# Patient Record
Sex: FEMALE | Race: WHITE | NOT HISPANIC OR LATINO | Employment: UNEMPLOYED | URBAN - METROPOLITAN AREA
[De-identification: names, ages, dates, MRNs, and addresses within clinical notes are randomized per-mention and may not be internally consistent; named-entity substitution may affect disease eponyms.]

---

## 2017-04-11 ENCOUNTER — ALLSCRIPTS OFFICE VISIT (OUTPATIENT)
Dept: OTHER | Facility: OTHER | Age: 52
End: 2017-04-11

## 2017-04-11 ENCOUNTER — TRANSCRIBE ORDERS (OUTPATIENT)
Dept: ADMINISTRATIVE | Facility: HOSPITAL | Age: 52
End: 2017-04-11

## 2017-04-11 ENCOUNTER — HOSPITAL ENCOUNTER (OUTPATIENT)
Dept: RADIOLOGY | Facility: CLINIC | Age: 52
Discharge: HOME | End: 2017-04-11
Payer: COMMERCIAL

## 2017-04-11 DIAGNOSIS — M79.642 PAIN OF LEFT HAND: ICD-10-CM

## 2017-04-11 DIAGNOSIS — M79.641 PAIN OF RIGHT HAND: ICD-10-CM

## 2017-04-11 DIAGNOSIS — R20.0 NUMBNESS: Primary | ICD-10-CM

## 2017-04-11 PROCEDURE — 73130 X-RAY EXAM OF HAND: CPT

## 2017-04-17 ENCOUNTER — OFFICE VISIT (OUTPATIENT)
Dept: RADIOLOGY | Facility: CLINIC | Age: 52
End: 2017-04-17
Payer: COMMERCIAL

## 2017-04-17 DIAGNOSIS — R20.0 NUMBNESS: ICD-10-CM

## 2017-04-17 PROCEDURE — 95909 NRV CNDJ TST 5-6 STUDIES: CPT

## 2017-04-17 PROCEDURE — 95886 MUSC TEST DONE W/N TEST COMP: CPT

## 2017-05-03 ENCOUNTER — ALLSCRIPTS OFFICE VISIT (OUTPATIENT)
Dept: OTHER | Facility: OTHER | Age: 52
End: 2017-05-03

## 2017-05-15 ENCOUNTER — ALLSCRIPTS OFFICE VISIT (OUTPATIENT)
Dept: OTHER | Facility: OTHER | Age: 52
End: 2017-05-15

## 2017-05-15 DIAGNOSIS — M46.1 SACROILIITIS, NOT ELSEWHERE CLASSIFIED (HCC): ICD-10-CM

## 2017-05-15 DIAGNOSIS — M54.2 CERVICALGIA: ICD-10-CM

## 2017-05-15 DIAGNOSIS — M54.41 LOW BACK PAIN WITH RIGHT-SIDED SCIATICA: ICD-10-CM

## 2017-05-19 ENCOUNTER — HOSPITAL ENCOUNTER (OUTPATIENT)
Facility: HOSPITAL | Age: 52
Setting detail: OUTPATIENT SURGERY
Discharge: HOME | End: 2017-05-19
Attending: ORTHOPAEDIC SURGERY | Admitting: ORTHOPAEDIC SURGERY
Payer: COMMERCIAL

## 2017-05-19 VITALS
SYSTOLIC BLOOD PRESSURE: 141 MMHG | HEIGHT: 67 IN | OXYGEN SATURATION: 96 % | DIASTOLIC BLOOD PRESSURE: 67 MMHG | HEART RATE: 76 BPM | TEMPERATURE: 97.9 F | RESPIRATION RATE: 18 BRPM | WEIGHT: 137 LBS | BODY MASS INDEX: 21.5 KG/M2

## 2017-05-19 RX ORDER — GABAPENTIN 100 MG/1
100 CAPSULE ORAL 3 TIMES DAILY
COMMUNITY
End: 2019-01-10 | Stop reason: ALTCHOICE

## 2017-05-19 RX ORDER — IBUPROFEN 200 MG
400 TABLET ORAL EVERY 6 HOURS PRN
COMMUNITY
End: 2017-05-19 | Stop reason: HOSPADM

## 2017-05-19 RX ORDER — HYDROCODONE BITARTRATE AND ACETAMINOPHEN 5; 325 MG/1; MG/1
1 TABLET ORAL EVERY 6 HOURS PRN
Qty: 10 TABLET | Refills: 0 | Status: SHIPPED | OUTPATIENT
Start: 2017-05-19 | End: 2017-05-29

## 2017-05-19 RX ORDER — IBUPROFEN 600 MG/1
600 TABLET ORAL EVERY 6 HOURS PRN
Qty: 10 TABLET | Refills: 0 | Status: SHIPPED | OUTPATIENT
Start: 2017-05-19 | End: 2022-03-25

## 2017-05-31 ENCOUNTER — ALLSCRIPTS OFFICE VISIT (OUTPATIENT)
Dept: OTHER | Facility: OTHER | Age: 52
End: 2017-05-31

## 2017-08-04 ENCOUNTER — OFFICE VISIT (OUTPATIENT)
Dept: URGENT CARE | Age: 52
End: 2017-08-04
Payer: COMMERCIAL

## 2017-08-04 PROCEDURE — S9083 URGENT CARE CENTER GLOBAL: HCPCS

## 2017-08-04 PROCEDURE — G0382 LEV 3 HOSP TYPE B ED VISIT: HCPCS

## 2017-11-23 ENCOUNTER — APPOINTMENT (EMERGENCY)
Dept: RADIOLOGY | Facility: HOSPITAL | Age: 52
End: 2017-11-23
Payer: COMMERCIAL

## 2017-11-23 ENCOUNTER — HOSPITAL ENCOUNTER (EMERGENCY)
Facility: HOSPITAL | Age: 52
Discharge: HOME | End: 2017-11-23
Attending: EMERGENCY MEDICINE | Admitting: EMERGENCY MEDICINE
Payer: COMMERCIAL

## 2017-11-23 VITALS
BODY MASS INDEX: 21.93 KG/M2 | RESPIRATION RATE: 18 BRPM | DIASTOLIC BLOOD PRESSURE: 63 MMHG | OXYGEN SATURATION: 93 % | TEMPERATURE: 99.9 F | WEIGHT: 139.99 LBS | SYSTOLIC BLOOD PRESSURE: 129 MMHG | HEART RATE: 88 BPM

## 2017-11-23 DIAGNOSIS — J20.9 ACUTE BRONCHITIS: Primary | ICD-10-CM

## 2017-11-23 LAB
ALBUMIN SERPL BCP-MCNC: 3.4 G/DL (ref 3.5–5)
ALP SERPL-CCNC: 62 U/L (ref 46–116)
ALT SERPL W P-5'-P-CCNC: 22 U/L (ref 12–78)
ANION GAP SERPL CALCULATED.3IONS-SCNC: 11 MMOL/L (ref 4–13)
AST SERPL W P-5'-P-CCNC: 18 U/L (ref 5–45)
BASOPHILS # BLD AUTO: 0.02 THOUSANDS/ΜL (ref 0–0.1)
BASOPHILS NFR BLD AUTO: 0 % (ref 0–1)
BILIRUB SERPL-MCNC: 0.2 MG/DL (ref 0.2–1)
BUN SERPL-MCNC: 15 MG/DL (ref 5–25)
CALCIUM SERPL-MCNC: 8.5 MG/DL (ref 8.3–10.1)
CHLORIDE SERPL-SCNC: 107 MMOL/L (ref 100–108)
CLARITY, POC: NORMAL
CO2 SERPL-SCNC: 25 MMOL/L (ref 21–32)
COLOR, POC: NORMAL
CREAT SERPL-MCNC: 0.94 MG/DL (ref 0.6–1.3)
EOSINOPHIL # BLD AUTO: 0.19 THOUSAND/ΜL (ref 0–0.61)
EOSINOPHIL NFR BLD AUTO: 1 % (ref 0–6)
ERYTHROCYTE [DISTWIDTH] IN BLOOD BY AUTOMATED COUNT: 12.9 % (ref 11.6–15.1)
EXT BILIRUBIN, UA: NEGATIVE
EXT BLOOD URINE: NORMAL
EXT GLUCOSE, UA: NEGATIVE
EXT KETONES: NEGATIVE
EXT NITRITE, UA: NEGATIVE
EXT PH, UA: 6
EXT PROTEIN, UA: NEGATIVE
EXT SPECIFIC GRAVITY, UA: 1.02
EXT UROBILINOGEN: 0.2
FLUAV AG SPEC QL IA: NEGATIVE
FLUBV AG SPEC QL IA: NEGATIVE
GFR SERPL CREATININE-BSD FRML MDRD: 69 ML/MIN/1.73SQ M
GLUCOSE SERPL-MCNC: 108 MG/DL (ref 65–140)
HCT VFR BLD AUTO: 36.5 % (ref 34.8–46.1)
HGB BLD-MCNC: 12.4 G/DL (ref 11.5–15.4)
LYMPHOCYTES # BLD AUTO: 1.52 THOUSANDS/ΜL (ref 0.6–4.47)
LYMPHOCYTES NFR BLD AUTO: 9 % (ref 14–44)
MCH RBC QN AUTO: 30.4 PG (ref 26.8–34.3)
MCHC RBC AUTO-ENTMCNC: 34 G/DL (ref 31.4–37.4)
MCV RBC AUTO: 90 FL (ref 82–98)
MONOCYTES # BLD AUTO: 1.03 THOUSAND/ΜL (ref 0.17–1.22)
MONOCYTES NFR BLD AUTO: 6 % (ref 4–12)
NEUTROPHILS # BLD AUTO: 13.84 THOUSANDS/ΜL (ref 1.85–7.62)
NEUTS SEG NFR BLD AUTO: 84 % (ref 43–75)
PLATELET # BLD AUTO: 259 THOUSANDS/UL (ref 149–390)
PMV BLD AUTO: 10.6 FL (ref 8.9–12.7)
POTASSIUM SERPL-SCNC: 3.4 MMOL/L (ref 3.5–5.3)
PROT SERPL-MCNC: 6.1 G/DL (ref 6.4–8.2)
RBC # BLD AUTO: 4.08 MILLION/UL (ref 3.81–5.12)
SODIUM SERPL-SCNC: 143 MMOL/L (ref 136–145)
WBC # BLD AUTO: 16.6 THOUSAND/UL (ref 4.31–10.16)
WBC # BLD EST: NEGATIVE 10*3/UL

## 2017-11-23 PROCEDURE — 94640 AIRWAY INHALATION TREATMENT: CPT

## 2017-11-23 PROCEDURE — 93005 ELECTROCARDIOGRAM TRACING: CPT | Performed by: EMERGENCY MEDICINE

## 2017-11-23 PROCEDURE — 36415 COLL VENOUS BLD VENIPUNCTURE: CPT | Performed by: EMERGENCY MEDICINE

## 2017-11-23 PROCEDURE — 80053 COMPREHEN METABOLIC PANEL: CPT | Performed by: EMERGENCY MEDICINE

## 2017-11-23 PROCEDURE — 99285 EMERGENCY DEPT VISIT HI MDM: CPT

## 2017-11-23 PROCEDURE — 87400 INFLUENZA A/B EACH AG IA: CPT | Performed by: EMERGENCY MEDICINE

## 2017-11-23 PROCEDURE — 81002 URINALYSIS NONAUTO W/O SCOPE: CPT | Performed by: EMERGENCY MEDICINE

## 2017-11-23 PROCEDURE — 87798 DETECT AGENT NOS DNA AMP: CPT | Performed by: EMERGENCY MEDICINE

## 2017-11-23 PROCEDURE — 96361 HYDRATE IV INFUSION ADD-ON: CPT

## 2017-11-23 PROCEDURE — 96374 THER/PROPH/DIAG INJ IV PUSH: CPT

## 2017-11-23 PROCEDURE — 85025 COMPLETE CBC W/AUTO DIFF WBC: CPT | Performed by: EMERGENCY MEDICINE

## 2017-11-23 PROCEDURE — 71010 HB CHEST X-RAY 1 VIEW FRONTAL (PORTABLE): CPT

## 2017-11-23 RX ORDER — IBUPROFEN 400 MG/1
400 TABLET ORAL ONCE
Status: COMPLETED | OUTPATIENT
Start: 2017-11-23 | End: 2017-11-23

## 2017-11-23 RX ORDER — AZITHROMYCIN 250 MG/1
TABLET, FILM COATED ORAL
Qty: 4 TABLET | Refills: 0 | Status: SHIPPED | OUTPATIENT
Start: 2017-11-23 | End: 2018-06-20 | Stop reason: ALTCHOICE

## 2017-11-23 RX ORDER — METHYLPREDNISOLONE SODIUM SUCCINATE 125 MG/2ML
125 INJECTION, POWDER, LYOPHILIZED, FOR SOLUTION INTRAMUSCULAR; INTRAVENOUS ONCE
Status: COMPLETED | OUTPATIENT
Start: 2017-11-23 | End: 2017-11-23

## 2017-11-23 RX ORDER — GUAIFENESIN/DEXTROMETHORPHAN 100-10MG/5
10 SYRUP ORAL ONCE
Status: COMPLETED | OUTPATIENT
Start: 2017-11-23 | End: 2017-11-23

## 2017-11-23 RX ORDER — ALBUTEROL SULFATE 90 UG/1
1 AEROSOL, METERED RESPIRATORY (INHALATION) ONCE
Status: COMPLETED | OUTPATIENT
Start: 2017-11-23 | End: 2017-11-23

## 2017-11-23 RX ORDER — AZITHROMYCIN 250 MG/1
500 TABLET, FILM COATED ORAL ONCE
Status: COMPLETED | OUTPATIENT
Start: 2017-11-23 | End: 2017-11-23

## 2017-11-23 RX ORDER — PROMETHAZINE HYDROCHLORIDE AND CODEINE PHOSPHATE 6.25; 1 MG/5ML; MG/5ML
5 SYRUP ORAL EVERY 4 HOURS PRN
Qty: 120 ML | Refills: 0 | Status: SHIPPED | OUTPATIENT
Start: 2017-11-23 | End: 2017-12-03

## 2017-11-23 RX ORDER — PREDNISONE 20 MG/1
20 TABLET ORAL DAILY
Qty: 12 TABLET | Refills: 0 | Status: SHIPPED | OUTPATIENT
Start: 2017-11-23 | End: 2017-11-27

## 2017-11-23 RX ADMIN — GUAIFENESIN AND DEXTROMETHORPHAN 10 ML: 100; 10 SYRUP ORAL at 20:34

## 2017-11-23 RX ADMIN — AZITHROMYCIN 500 MG: 250 TABLET, FILM COATED ORAL at 21:33

## 2017-11-23 RX ADMIN — IBUPROFEN 400 MG: 400 TABLET, FILM COATED ORAL at 21:34

## 2017-11-23 RX ADMIN — ALBUTEROL SULFATE 5 MG: 2.5 SOLUTION RESPIRATORY (INHALATION) at 20:37

## 2017-11-23 RX ADMIN — IPRATROPIUM BROMIDE 0.5 MG: 0.5 SOLUTION RESPIRATORY (INHALATION) at 20:37

## 2017-11-23 RX ADMIN — METHYLPREDNISOLONE SODIUM SUCCINATE 125 MG: 125 INJECTION, POWDER, FOR SOLUTION INTRAMUSCULAR; INTRAVENOUS at 21:37

## 2017-11-23 RX ADMIN — SODIUM CHLORIDE 1000 ML: 0.9 INJECTION, SOLUTION INTRAVENOUS at 20:41

## 2017-11-23 RX ADMIN — ALBUTEROL SULFATE 1 PUFF: 90 AEROSOL, METERED RESPIRATORY (INHALATION) at 21:35

## 2017-11-24 LAB
ATRIAL RATE: 85 BPM
FLUAV AG SPEC QL: NORMAL
FLUBV AG SPEC QL: NORMAL
P AXIS: 60 DEGREES
PR INTERVAL: 134 MS
QRS AXIS: 79 DEGREES
QRSD INTERVAL: 92 MS
QT INTERVAL: 340 MS
QTC INTERVAL: 404 MS
RSV B RNA SPEC QL NAA+PROBE: NORMAL
T WAVE AXIS: 62 DEGREES
VENTRICULAR RATE: 85 BPM

## 2017-11-24 NOTE — DISCHARGE INSTRUCTIONS
Acute Bronchitis   WHAT YOU SHOULD KNOW:   Acute bronchitis is swelling and irritation in the air passages of your lungs  This irritation may cause you to cough or have other breathing problems  Acute bronchitis often starts because of another viral illness, such as a cold or the flu  The illness spreads from your nose and throat to your windpipe and airways  Bronchitis is often called a chest cold  Acute bronchitis lasts about 2 weeks and is usually not a serious illness  AFTER YOU LEAVE:   Medicines:   · Ibuprofen or acetaminophen:  These medicines help lower a fever  They are available without a doctor's order  Ask your healthcare provider which medicine is right for you  Ask how much to take and how often to take it  Follow directions  These medicines can cause stomach bleeding if not taken correctly  Ibuprofen can cause kidney damage  Do not take ibuprofen if you have kidney disease, an ulcer, or allergies to aspirin  Acetaminophen can cause liver damage  Do not drink alcohol if you take acetaminophen  · Cough medicine: This medicine helps loosen mucus in your lungs and make it easier to cough up  This can help you breathe easier  · Inhalers: You may need one or more inhalers to help you breathe easier and cough less  An inhaler gives your medicine in a mist form so that you can breathe it into your lungs  Ask your healthcare provider to show you how to use your inhaler correctly  · Steroid medicine:  Steroid medicine helps open your air passages so you can breathe easier  · Take your medicine as directed  Call your healthcare provider if you think your medicine is not helping or if you have side effects  Tell him if you are allergic to any medicine  Keep a list of the medicines, vitamins, and herbs you take  Include the amounts, and when and why you take them  Bring the list or the pill bottles to follow-up visits  Carry your medicine list with you in case of an emergency    How to use an inhaler:   · Shake the inhaler well to make sure you get the correct amount of medicine per puff  Remove the cover from your inhaler's mouthpiece  If you are using a spacer, connect your inhaler to the flat end of the spacer  · Exhale as much air from your lungs as you can  Put the mouthpiece in your mouth past your front teeth and rest it on the top of your tongue  Do not block the mouthpiece opening with your tongue  · Breathe in through your mouth at a slow and steady rate  As you do this, press the inhaler to release the puff of medicine  Finish breathing in slowly and deeply as you inhale the medicine  When your lungs are full, hold your breath for 10 seconds  Then breathe out slowly through puckered lips or through your nose  · If you need to take more puffs, wait at least 1 minute between each puff  · Rinse your mouth with water after you use the inhaler  This may keep you from getting a mouth infection or irritation  · Follow the instructions that come with your inhaler to clean it  You should clean your inhaler at least once a week  Ways to care for yourself:   · Avoid alcohol:  Alcohol dulls your urge to cough and sneeze  When you have bronchitis, you need to be able to cough and sneeze to clear your air passages  Alcohol also causes your body to lose fluid  This can make the mucus in your lungs thicker and harder to cough up  · Avoid irritants in the air:  Do not smoke or allow others to smoke around you  Avoid chemicals, fumes, and dust  Wear a face mask if you must work around dust or fumes  Stay inside on days when air pollution levels are high  If you have allergies, stay inside when pollen counts are high  Avoid aerosol products  This includes spray-on deodorant, bug spray, and hair spray  · Drink more liquids:  Most people should drink at least 8 eight-ounce cups of water a day  You may need to drink more liquids when you have acute bronchitis   Liquids help keep your air passages moist and help you cough up mucus  · Get more rest:  You may feel like resting more  Slowly start to do more each day  Rest when you feel it is needed  · Eat healthy foods:  Eat a variety healthy foods every day  Your diet should include fruits, vegetables, breads, and protein (such as chicken, fish, and beans)  Dairy products (such as milk, cheese, and ice cream) can sometimes increase the amount of mucus your body makes  Ask if you should decrease your intake of dairy products  · Use a humidifier:  Use a cool mist humidifier to increase air moisture in your home  This may make it easier for you to breathe and help decrease your cough  Decrease your risk of acute bronchitis:   · Get the vaccinations you need:  Ask your healthcare provider if you should get vaccinated against the flu or pneumonia  · Avoid things that may irritate your lungs:  Stay inside or cover your mouth and nose with a scarf when you are outside during cold weather  You should also stay inside on days when air pollution levels are high  If you have allergies, stay inside when pollen counts are high  Avoid using aerosol products in your home  This includes spray-on deodorant, bug spray, and hair spray  · Avoid the spread of germs:        [facility] your hands often with soap and water  Carry germ-killing gel with you  You can use the gel to clean your hands when there is no soap and water available  ¨ Do not touch your eyes, nose, or mouth unless you have washed your hands first     ¨ Always cover your mouth when you cough  Cough into a tissue or your shirtsleeve so you do not spread germs from your hands  ¨ Try to avoid people who have a cold or the flu  If you are sick, stay away from others as much as possible  Follow up with your healthcare provider as directed:  Write down questions you have so you will remember to ask them during your follow-up visits    Contact your healthcare provider if:   · You have a fever     · Your skin becomes itchy or you have a rash after you take your medicine  · Your breathing problems do not go away or get worse  · Your cough does not get better with treatment  · You cough up blood  · You have questions or concerns about your condition or care  Seek care immediately or call 911 if:   · You faint  · Your lips or fingernails turn blue  · You feel like you are not getting enough air when you breathe  · You have swelling of your lips, tongue, or throat that makes it hard to breathe or swallow  © 2014 [address] [employer] is for End User's use only and may not be sold, redistributed or otherwise used for commercial purposes  All illustrations and images included in CareNotes® are the copyrighted property of [employer]  or Damaso Humphries  The above information is an  only  It is not intended as medical advice for individual conditions or treatments  Talk to your doctor, nurse or pharmacist before following any medical regimen to see if it is safe and effective for you

## 2017-11-24 NOTE — ED PROVIDER NOTES
History  Chief Complaint   Patient presents with    Breathing Difficulty     c/o cough and weakness x 2 days  Cough is non-productive  History provided by:  Patient   used: No    Cough   Cough characteristics:  Non-productive  Sputum characteristics:  Nondescript  Severity:  Moderate  Onset quality:  Gradual  Duration:  2 days  Timing:  Constant  Progression:  Worsening  Chronicity:  New  Smoker: yes    Context: upper respiratory infection    Ineffective treatments:  None tried  Associated symptoms: shortness of breath    Associated symptoms: no chest pain, no ear pain, no eye discharge, no fever, no headaches, no rash and no wheezing    Shortness of breath:     Severity:  Moderate    Onset quality:  Sudden    Duration:  1 day    Timing:  Constant    Progression:  Worsening      Prior to Admission Medications   Prescriptions Last Dose Informant Patient Reported? Taking?   gabapentin (NEURONTIN) 100 mg capsule   Yes No   Sig: Take 100 mg by mouth 3 (three) times a day   ibuprofen (MOTRIN) 600 mg tablet   No No   Sig: Take 1 tablet by mouth every 6 (six) hours as needed for mild pain or moderate pain for up to 30 days      Facility-Administered Medications: None       Past Medical History:   Diagnosis Date    Nerve pain        Past Surgical History:   Procedure Laterality Date    CHOLECYSTECTOMY      [place] WRIST Christia Coy LIG Left 5/19/2017    Procedure: ENDOSCOPIC CARPAL TUNNEL RELEASE ;  Surgeon: Cameron Tinoco MD;  Location[facility];  Service: Orthopedics    TONSILLECTOMY         No family history on file  I have reviewed and agree with the history as documented  Social History   Substance Use Topics    Smoking status: Current Every Day Smoker     Packs/day: 1 00    Smokeless tobacco: Never Used    Alcohol use 4 2 oz/week     7 Glasses of wine per week        Review of Systems   Constitutional: Negative for fatigue and fever     HENT: Negative for congestion and ear pain  Eyes: Negative for discharge and redness  Respiratory: Positive for cough and shortness of breath  Negative for apnea and wheezing  Cardiovascular: Negative for chest pain  Gastrointestinal: Negative for abdominal pain and diarrhea  Endocrine: Negative for cold intolerance and polydipsia  Genitourinary: Negative for difficulty urinating and hematuria  Musculoskeletal: Negative for arthralgias and back pain  Skin: Negative for color change and rash  Allergic/Immunologic: Negative for environmental allergies and immunocompromised state  Neurological: Negative for numbness and headaches  Hematological: Negative for adenopathy  Does not bruise/bleed easily  Psychiatric/Behavioral: Negative for agitation and behavioral problems  Physical Exam  ED Triage Vitals   Temperature Pulse Respirations Blood Pressure SpO2   11/23/17 1924 11/23/17 1923 11/23/17 1923 11/23/17 1923 11/23/17 1923   99 9 °F (37 7 °C) 102 18 146/76 93 %      Temp Source Heart Rate Source Patient Position - Orthostatic VS BP Location FiO2 (%)   11/23/17 1923 11/23/17 1923 11/23/17 1923 11/23/17 1923 --   Oral Monitor Sitting Right arm       Pain Score       11/23/17 1923       No Pain           Orthostatic Vital Signs  Vitals:    11/23/17 1923 11/23/17 2135   BP: 146/76 129/63   Pulse: 102 88   Patient Position - Orthostatic VS: Sitting Lying       Physical Exam   Constitutional: She is oriented to person, place, and time  Vital signs are normal  She appears well-developed and well-nourished  Non-toxic appearance  HENT:   Head: Normocephalic and atraumatic  Right Ear: Tympanic membrane and external ear normal    Left Ear: Tympanic membrane and external ear normal    Nose: Nose normal  No rhinorrhea, sinus tenderness or nasal deformity  Mouth/Throat: Uvula is midline and oropharynx is clear and moist  Normal dentition     Eyes: Conjunctivae, EOM and lids are normal  Pupils are equal, round, and reactive to light  Right eye exhibits no discharge  Left eye exhibits no discharge  Neck: Trachea normal and normal range of motion  Neck supple  No JVD present  Carotid bruit is not present  Cardiovascular: Normal rate, regular rhythm, intact distal pulses and normal pulses  No extrasystoles are present  PMI is not displaced  Pulmonary/Chest: Effort normal  No accessory muscle usage  No respiratory distress  She has decreased breath sounds  She has no wheezes  She has no rhonchi  She has no rales  Abdominal: Soft  Normal appearance and bowel sounds are normal  She exhibits no mass  There is no tenderness  There is no rigidity, no rebound and no guarding  Musculoskeletal:        Right shoulder: She exhibits normal range of motion, no bony tenderness, no swelling and no deformity  Cervical back: Normal  She exhibits normal range of motion, no tenderness, no bony tenderness and no deformity  Lymphadenopathy:     She has no cervical adenopathy  She has no axillary adenopathy  Neurological: She is alert and oriented to person, place, and time  She has normal strength and normal reflexes  No cranial nerve deficit or sensory deficit  GCS eye subscore is 4  GCS verbal subscore is 5  GCS motor subscore is 6  Skin: Skin is warm and dry  No rash noted  Psychiatric: She has a normal mood and affect  Her speech is normal and behavior is normal    Nursing note and vitals reviewed        ED Medications  Medications   albuterol inhalation solution 5 mg (5 mg Nebulization Given 11/23/17 2037)   ipratropium (ATROVENT) 0 02 % inhalation solution 0 5 mg (0 5 mg Nebulization Given 11/23/17 2037)   sodium chloride 0 9 % bolus 1,000 mL (0 mL Intravenous Stopped 11/23/17 2145)   dextromethorphan-guaiFENesin (ROBITUSSIN DM)  mg/5 mL oral syrup 10 mL (10 mL Oral Given 11/23/17 2034)   methylPREDNISolone sodium succinate (Solu-MEDROL) injection 125 mg (125 mg Intravenous Given 11/23/17 2137) ibuprofen (MOTRIN) tablet 400 mg (400 mg Oral Given 11/23/17 2134)   azithromycin (ZITHROMAX) tablet 500 mg (500 mg Oral Given 11/23/17 2133)   albuterol (PROVENTIL HFA,VENTOLIN HFA) inhaler 1 puff (1 puff Inhalation Given 11/23/17 2135)       Diagnostic Studies  Results Reviewed     Procedure Component Value Units Date/Time    Comprehensive metabolic panel [[id number]]  (Abnormal) Collected:  11/23/17 2027    Lab Status:  Final result Specimen:  Blood from Arm, Right Updated:  11/23/17 2104     Sodium 143 mmol/L      Potassium 3 4 (L) mmol/L      Chloride 107 mmol/L      CO2 25 mmol/L      Anion Gap 11 mmol/L      BUN 15 mg/dL      Creatinine 0 94 mg/dL      Glucose 108 mg/dL      Calcium 8 5 mg/dL      AST 18 U/L      ALT 22 U/L      Alkaline Phosphatase 62 U/L      Total Protein 6 1 (L) g/dL      Albumin 3 4 (L) g/dL      Total Bilirubin 0 20 mg/dL      eGFR 69 ml/min/1 73sq m     Narrative:         National Kidney Disease Education Program recommendations are as follows:  GFR calculation is accurate only with a steady state creatinine  Chronic Kidney disease less than 60 ml/min/1 73 sq  meters  Kidney failure less than 15 ml/min/1 73 sq  meters  Rapid Influenza Screen with Reflex PCR (indicated for patients <2mo of age) [[id number]]  (Normal) Collected:  11/23/17 2026    Lab Status:  Final result Specimen:  Nasopharyngeal from Nasopharyngeal Swab Updated:  11/23/17 2102     Rapid Influenza A Ag Negative     Rapid Influenza B Ag Negative    Influenza A/B and RSV by PCR (Indicated for patients > 2 mo of age) [[id number]] Collected:  11/23/17 2026    Lab Status:   In process Specimen:  Nasopharyngeal from Nasopharyngeal Swab Updated:  11/23/17 2101    CBC and differential [[id number]]  (Abnormal) Collected:  11/23/17 2027    Lab Status:  Final result Specimen:  Blood from Arm, Right Updated:  11/23/17 2047     WBC 16 60 (H) Thousand/uL      RBC 4 08 Million/uL      Hemoglobin 12 4 g/dL      Hematocrit 36 5 %      MCV 90 fL      MCH 30 4 pg      MCHC 34 0 g/dL      RDW 12 9 %      MPV 10 6 fL      Platelets 101 Thousands/uL      Neutrophils Relative 84 (H) %      Lymphocytes Relative 9 (L) %      Monocytes Relative 6 %      Eosinophils Relative 1 %      Basophils Relative 0 %      Neutrophils Absolute 13 84 (H) Thousands/µL      Lymphocytes Absolute 1 52 Thousands/µL      Monocytes Absolute 1 03 Thousand/µL      Eosinophils Absolute 0 19 Thousand/µL      Basophils Absolute 0 02 Thousands/µL     POCT urinalysis dipstick [[id number]]  (Normal) Resulted:  11/23/17 2024    Lab Status:  Final result Specimen:  Urine Updated:  11/23/17 2025     Color, UA color     Clarity, UA hazy     EXT Glucose, UA Negative     EXT Bilirubin, UA (Ref: Negative) Negative     EXT Ketones, UA (Ref: Negative) Negative     EXT Spec Grav, UA 1 020     EXT Blood, UA (Ref: Negative) Large     EXT pH, UA 6 0     EXT Protein, UA (Ref: Negative) Negative     EXT Urobilinogen, UA (Ref: 0 2- 1 0) 0 2     EXT Leukocytes, UA (Ref: Negative) Negative     EXT Nitrite, UA (Ref: Negative) Negative                 XR chest 1 view portable    (Results Pending)              Procedures  ECG 12 Lead Documentation  Date/Time: 11/23/2017 7:59 PM  Performed by: Dolly Arizmendi  Authorized by: Dolly Arizmendi     Patient location:  ED  Rate:     ECG rate:  85  Rhythm:     Rhythm: sinus rhythm               Phone Contacts  ED Phone Contact    ED Course  ED Course                                MDM  Number of Diagnoses or Management Options  Acute bronchitis: new and requires workup     Amount and/or Complexity of Data Reviewed  Clinical lab tests: ordered and reviewed  Tests in the radiology section of CPT®: ordered and reviewed  Tests in the medicine section of CPT®: ordered and reviewed  Independent visualization of images, tracings, or specimens: yes (cxr no pneumonia)    Patient Progress  Patient progress: improved    CritCare Time    Disposition  Final diagnoses:   Acute bronchitis     Time reflects when diagnosis was documented in both MDM as applicable and the Disposition within this note     Time User Action Codes Description Comment    11/23/2017  9:16 PM Michelle CATALAN Add [J20 9] Acute bronchitis       ED Disposition     ED Disposition Condition Comment    Discharge  [facility] discharge to home/self care  Condition at discharge: Good        Follow-up Information     Follow up With Specialties Details Why Contact Info    Rafael Lung, DO Family Medicine Schedule an appointment as soon as possible for a visit  [address] [place] [address] Dr [address] [place] [address]          Discharge Medication List as of 11/23/2017  9:18 PM      CONTINUE these medications which have NOT CHANGED    Details   gabapentin (NEURONTIN) 100 mg capsule Take 100 mg by mouth 3 (three) times a day, Until Discontinued, Historical Med      ibuprofen (MOTRIN) 600 mg tablet Take 1 tablet by mouth every 6 (six) hours as needed for mild pain or moderate pain for up to 30 days, Starting 5/19/2017, Until Sun 6/18/17, Print           No discharge procedures on file      ED Provider  Electronically Signed by           Kane Leigh DO  11/23/17 1200 Livia Aquino DO  11/23/17 [id number]

## 2018-01-12 NOTE — MISCELLANEOUS
Provider Comments  Provider Comments:   Our records indicate that you missed an appointment on 12/21/16  If you have not done so already, please call our office and we will be happy to schedule another appointment for you  If you must cancel your appointment, our office policy requires you to call our office at least 24 hours in advance so that we may utilize your appointment time for another patient who requires our services  If you have any questions, please contact our office at [phone]           Signatures   Electronically signed by : Jonathan Hoffman, ; Dec 21 2016  2:24PM EST                       (Author)

## 2018-01-13 VITALS
SYSTOLIC BLOOD PRESSURE: 159 MMHG | HEIGHT: 68 IN | DIASTOLIC BLOOD PRESSURE: 92 MMHG | HEART RATE: 67 BPM | WEIGHT: 135 LBS | BODY MASS INDEX: 20.46 KG/M2

## 2018-01-13 NOTE — PROGRESS NOTES
Assessment    1  Well adult on routine health check (V70 0) (Z00 00)   2  Breast cancer screening (V76 10) (Z12 39)   3  Colon cancer screening (V76 51) (Z12 11)   4  Cervical cancer screening (V76 2) (Z12 4)   5  Need for DTaP vaccine (V06 1) (Z23)   6  Lipid screening (V77 91) (Z13 220)   7  Elevated blood pressure reading without diagnosis of hypertension (796 2) (R03 0)   8  Varicose veins of both lower extremities with pain (454 8) (T31 275)    Plan  Breast cancer screening    · * MAMMO SCREENING BILATERAL W CAD; Status:Active; Requested for:66Coi8119;   Cervical cancer screening    · 1 Lance Carrillo MD, [address] [place] [address] Obstetrics/Gynecology Physician Referral  Consult  Status: Hold  For - Scheduling  Requested for: 54WOP8233  Care Summary provided  : Yes  Colon cancer screening    · COLONOSCOPY; Status:Active; Requested for:84Pch0693;    · 1 - Michael Henry MD (Gastroenterology) Physician Referral  Consult  Status: Active   Requested for: 00YOH9015  Care Summary provided  : Yes  Elevated blood pressure reading without diagnosis of hypertension, Lipid screening    · (1) COMPREHENSIVE METABOLIC PANEL; Status:Active; Requested for:95Yya6440;    · (1) LIPID PANEL FASTING W DIRECT LDL REFLEX; Status:Active; Requested  for:19Oks5185;    · (1) TSH WITH FT4 REFLEX; Status:Active; Requested for:99Ixc1312;   Need for DTaP vaccine    · Adacel 5-2-15 5 LF-MCG/0 5 Intramuscular Suspension  Varicose veins of both lower extremities with pain    · 1 - Sandor MOLINA, Sanket Yeboah  (Vascular Surgery) Physician Referral  Consult  Status: Active   Requested for: 96FTS5312  Care Summary provided  : Yes    Discussion/Summary  health maintenance visit Currently, she eats a healthy diet and has an adequate exercise regimen  the risks and benefits of cervical cancer screening were discussed Breast cancer screening: the risks and benefits of breast cancer screening were discussed   Colorectal cancer screening: the risks and benefits of colorectal cancer screening were discussed  The immunizations are needed  Advice and education were given regarding aerobic exercise and weight bearing exercise  Patient discussion: discussed with the patient  Chief Complaint  Patient here for Annual wellness      History of Present Illness  HM, Adult Female: The patient is being seen for a health maintenance evaluation  General Health: The patient's health since the last visit is described as good  She has regular dental visits  She denies vision problems  She denies hearing loss  Lifestyle:  She consumes a diverse and healthy diet  She does not have any weight concerns  She exercises regularly  She uses tobacco  The patient is a current cigarette smoker  Tobacco Use Duration: 1/2 cigarette pack(s) per day and 20 pack year(s) of cigarette use  She denies alcohol use  She denies drug use  Reproductive health: the patient is postmenopausal   she uses no contraception  pregnancy history:  Screening: cancer screening reviewed and updated  Cervical cancer screening includes no previous pap smear  Breast cancer screening includes uncertain timing of her last mammogram  She hasn't been previously screened for colorectal cancer  metabolic screening reviewed and updated  Metabolic screening includes lipid profile performed within the past five years, glucose screening performed last year and thyroid function test performed last year  Review of Systems    Constitutional: No fever, no chills, feels well, no tiredness, no recent weight gain or weight loss  Eyes: No complaints of eye pain, no red eyes, no eyesight problems, no discharge, no dry eyes, no itching of eyes  ENT: no complaints of earache, no loss of hearing, no nose bleeds, no nasal discharge, no sore throat, no hoarseness  Cardiovascular: No complaints of slow heart rate, no fast heart rate, no chest pain, no palpitations, no leg claudication, no lower extremity edema     Respiratory: No complaints of shortness of breath, no wheezing, no cough, no SOB on exertion, no orthopnea, no PND  Gastrointestinal: No complaints of abdominal pain, no constipation, no nausea or vomiting, no diarrhea, no bloody stools  Genitourinary: No complaints of dysuria, no incontinence, no pelvic pain, no dysmenorrhea, no vaginal discharge or bleeding  Musculoskeletal: No complaints of arthralgias, no myalgias, no joint swelling or stiffness, no limb pain or swelling  Integumentary: skin lesion, but as noted in HPI  Neurological: No complaints of headache, no confusion, no convulsions, no numbness, no dizziness or fainting, no tingling, no limb weakness, no difficulty walking  Psychiatric: Not suicidal, no sleep disturbance, no anxiety or depression, no change in personality, no emotional problems  Endocrine: No complaints of proptosis, no hot flashes, no muscle weakness, no deepening of the voice, no feelings of weakness  Hematologic/Lymphatic: No complaints of swollen glands, no swollen glands in the neck, does not bleed easily, does not bruise easily  Active Problems    1  Right shoulder pain (719 41) (M25 511)   2  Shoulder injury (959 2) (S49 90XA)    Past Medical History    · History of hypertension (V12 59) (Z86 79)    Surgical History    · History of Cholecystectomy   · History of Tonsillectomy With Adenoidectomy    Family History  Mother    · Family history of heart murmur (V17 49) (Z84 89)   · Family history of hypertension (V17 49) (Z82 49)  Father    · Family history of    · Family history of cardiac disorder (V17 49) (Z82 49)   · Family history of heart murmur (V17 49) (Z84 89)   · Family history of hypertension (V17 49) (Z82 49)    Social History    · Coffee   · Current every day smoker (305 1) (F17 200)   · No drug use   · Occasional alcohol use   · Smokes cigarettes (305 1) (F17 210)    Current Meds   1  Ibuprofen 600 MG Oral Tablet; TAKE 1 TABLET 3 TIMES DAILY AS NEEDED;    Therapy: 58VBN5427 to (Evaluate:89Wai1108) Recorded   2  Multi-Vitamin Daily Oral Tablet; TAKE 1 TABLET DAILY; Therapy: 19CTV0424 to Recorded    Allergies    1  No Known Drug Allergies    2  Seasonal    Vitals   Recorded: 01OVN0400 46:10EB   Systolic 205   Diastolic 80   Heart Rate 88   Respiration 16   Height 5 ft 7 84 in   Weight 137 lb 2 oz   BMI Calculated 20 95   BSA Calculated 1 74     Physical Exam    Constitutional   General appearance: No acute distress, well appearing and well nourished  Head and Face   Head and face: Normal     Eyes   Conjunctiva and lids: No swelling, erythema or discharge  Pupils and irises: Equal, round, reactive to light  Ears, Nose, Mouth, and Throat   External inspection of ears and nose: Normal     Otoscopic examination: Tympanic membranes translucent with normal light reflex  Canals patent without erythema  Hearing: Normal     Nasal mucosa, septum, and turbinates: Normal without edema or erythema  Lips, teeth, and gums: Normal, good dentition  Oropharynx: Normal with no erythema, edema, exudate or lesions  Neck   Neck: Supple, symmetric, trachea midline, no masses  Thyroid: Normal, no thyromegaly  Pulmonary   Respiratory effort: No increased work of breathing or signs of respiratory distress  Auscultation of lungs: Clear to auscultation  Cardiovascular   Auscultation of heart: Normal rate and rhythm, normal S1 and S2, no murmurs  Examination of extremities for edema and/or varicosities: Abnormal   varicosities noted bilaterally  Abdomen   Abdomen: Non-tender, no masses  Liver and spleen: No hepatomegaly or splenomegaly  Lymphatic   Palpation of lymph nodes in neck: No lymphadenopathy  Palpation of lymph nodes in axillae: No lymphadenopathy  Musculoskeletal   Gait and station: Normal     Digits and nails: Normal without clubbing or cyanosis      Joints, bones, and muscles: Normal     Range of motion: Normal     Stability: Normal     Muscle strength/tone: Normal     Skin   Skin and subcutaneous tissue: Normal without rashes or lesions  Palpation of skin and subcutaneous tissue: Normal turgor  Neurologic   Cranial nerves: Cranial nerves II-XII intact  Cortical function: Normal mental status  Reflexes: 2+ and symmetric  Sensation: No sensory loss  Coordination: Normal finger to nose and heel to shin  Psychiatric   Judgment and insight: Normal     Orientation to person, place, and time: Normal     Recent and remote memory: Intact  Mood and affect: Normal        Results/Data  PHQ-2 Adult Depression Screening [id number] 03:11PM User, [facility]     Test Name Result Flag Reference   PHQ-2 Adult Depression Score 0     Over the last two weeks, how often have you been bothered by any of the following problems?   Little interest or pleasure in doing things: Not at all - 0  Feeling down, depressed, or hopeless: Not at all - 0   PHQ-2 Adult Depression Screening Negative         Future Appointments    Date/Time Provider Specialty Site   12/21/2016 01:00 PM He Malone DO Family Medicine [facility]     Signatures   Electronically signed by : Maureen Hernandez DO; Sep 21 2016  4:15PM EST                       (Author)

## 2018-01-14 VITALS
DIASTOLIC BLOOD PRESSURE: 84 MMHG | HEART RATE: 76 BPM | WEIGHT: 138 LBS | BODY MASS INDEX: 21.66 KG/M2 | SYSTOLIC BLOOD PRESSURE: 147 MMHG | HEIGHT: 67 IN

## 2018-01-14 VITALS
BODY MASS INDEX: 20.76 KG/M2 | HEART RATE: 96 BPM | WEIGHT: 137 LBS | RESPIRATION RATE: 16 BRPM | DIASTOLIC BLOOD PRESSURE: 76 MMHG | HEIGHT: 68 IN | SYSTOLIC BLOOD PRESSURE: 148 MMHG

## 2018-01-14 VITALS
BODY MASS INDEX: 20.95 KG/M2 | WEIGHT: 137 LBS | SYSTOLIC BLOOD PRESSURE: 151 MMHG | HEART RATE: 91 BPM | DIASTOLIC BLOOD PRESSURE: 76 MMHG

## 2018-01-18 NOTE — MISCELLANEOUS
Message  Return to work or school:    She is able to return to work on  06/19/2016            Signatures   Electronically signed by : Ajay Wiseman DO; Jun 14 2016 12:07PM EST                       (Author)

## 2018-06-20 ENCOUNTER — OFFICE VISIT (OUTPATIENT)
Dept: URGENT CARE | Age: 53
End: 2018-06-20
Payer: COMMERCIAL

## 2018-06-20 VITALS
OXYGEN SATURATION: 96 % | RESPIRATION RATE: 16 BRPM | SYSTOLIC BLOOD PRESSURE: 150 MMHG | BODY MASS INDEX: 20.84 KG/M2 | HEART RATE: 67 BPM | HEIGHT: 67 IN | DIASTOLIC BLOOD PRESSURE: 80 MMHG | TEMPERATURE: 98.4 F | WEIGHT: 132.8 LBS

## 2018-06-20 DIAGNOSIS — L08.9 TOE INFECTION: Primary | ICD-10-CM

## 2018-06-20 PROCEDURE — S9083 URGENT CARE CENTER GLOBAL: HCPCS | Performed by: NURSE PRACTITIONER

## 2018-06-20 PROCEDURE — G0382 LEV 3 HOSP TYPE B ED VISIT: HCPCS | Performed by: NURSE PRACTITIONER

## 2018-06-20 RX ORDER — CEPHALEXIN 500 MG/1
500 CAPSULE ORAL EVERY 8 HOURS SCHEDULED
Qty: 21 CAPSULE | Refills: 0
Start: 2018-06-20 | End: 2018-06-27

## 2018-06-20 RX ORDER — CEFADROXIL 500 MG/1
500 CAPSULE ORAL EVERY 12 HOURS SCHEDULED
Qty: 14 CAPSULE | Refills: 0 | Status: SHIPPED | OUTPATIENT
Start: 2018-06-20 | End: 2018-06-20 | Stop reason: CLARIF

## 2018-06-20 NOTE — PROGRESS NOTES
[facility]        NAME: Kb Sawant is a 46 y o  female  : 1965    MRN: [id number]  DATE: 2018  TIME: 2:56 PM    Assessment and Plan   Toe infection [L08 9]  1  Toe infection  cefadroxil (DURICEF) 500 mg capsule         Patient Instructions     Patient Instructions   Continue warm soaks  Start antibiotic  Take probiotic  Keep site covered to prevent rubbing  Follow up with podiatry  Go to ER with worsening symptom  s       Chief Complaint     Chief Complaint   Patient presents with    Nail Problem     Pt has a possible Toe infection         History of Present Illness   Kb Sawant presents to the clinic c/o    This is a 46year old female here today with complaints of sore on her toe  She states this started last week  It started as a corn on her foot  He sneakers have been rubbing the site  She tried OTC corn removal which did not help at all  Over the last several days she has noticed increased redness and swelling  The site is tender  No fevers, body aches or chills  No history of diabetes  Review of Systems   Review of Systems   Constitutional: Negative  HENT: Negative  Skin: Positive for wound  Neurological: Negative  Psychiatric/Behavioral: Negative            Current Medications     Long-Term Prescriptions   Medication Sig Dispense Refill    gabapentin (NEURONTIN) 100 mg capsule Take 100 mg by mouth 3 (three) times a day      ibuprofen (MOTRIN) 600 mg tablet Take 1 tablet by mouth every 6 (six) hours as needed for mild pain or moderate pain for up to 30 days 10 tablet 0       Current Allergies     Allergies as of 2018    (No Known Allergies)            The following portions of the patient's history were reviewed and updated as appropriate: allergies, current medications, past family history, past medical history, past social history, past surgical history and problem list     Objective   /80   Pulse 67   Temp 98 4 °F (36 9 °C) Resp 16   Ht 5' 7" (1 702 m)   Wt 60 2 kg (132 lb 12 8 oz)   SpO2 96%   BMI 20 80 kg/m²        Physical Exam     Physical Exam   Constitutional: She is oriented to person, place, and time  She appears well-developed and well-nourished  Neck: Neck supple  Cardiovascular: Normal rate, regular rhythm and normal heart sounds  Pulmonary/Chest: Breath sounds normal    Neurological: She is alert and oriented to person, place, and time  Skin: Skin is warm and dry  Left fifth toe: hard dry white region with redness and swelling around the boarder  Psychiatric: She has a normal mood and affect   Her behavior is normal

## 2018-06-20 NOTE — PATIENT INSTRUCTIONS
Continue warm soaks  Start antibiotic  Take probiotic  Keep site covered to prevent rubbing  Follow up with podiatry  Go to ER with worsening symptom  s

## 2018-06-20 NOTE — LETTER
June 20, 2018   2  Patient: Rito Sandoval   YOB: 1965   Date of Visit: 6/20/2018       To Whom It May Concern: It is my medical opinion that Marin Curtis may return to work on 06/25/2018  If you have any questions or concerns, please don't hesitate to call           Sincerely,        OC Cifuentes    CC: No Recipients

## 2018-11-05 ENCOUNTER — EVALUATION (OUTPATIENT)
Dept: PHYSICAL THERAPY | Facility: REHABILITATION | Age: 53
End: 2018-11-05
Payer: OTHER MISCELLANEOUS

## 2018-11-05 DIAGNOSIS — M25.562 ACUTE PAIN OF LEFT KNEE: Primary | ICD-10-CM

## 2018-11-05 PROCEDURE — 97161 PT EVAL LOW COMPLEX 20 MIN: CPT | Performed by: PHYSICAL THERAPIST

## 2018-11-05 PROCEDURE — G8990 OTHER PT/OT CURRENT STATUS: HCPCS | Performed by: PHYSICAL THERAPIST

## 2018-11-05 PROCEDURE — G8991 OTHER PT/OT GOAL STATUS: HCPCS | Performed by: PHYSICAL THERAPIST

## 2018-11-05 PROCEDURE — 97110 THERAPEUTIC EXERCISES: CPT | Performed by: PHYSICAL THERAPIST

## 2018-11-05 NOTE — PROGRESS NOTES
PT Evaluation     Today's date: 2018  Patient name: Christina Estevez  : 1965  MRN: [id number]  Referring provider: Pita Gaston MD  Dx:   Encounter Diagnosis     ICD-10-CM    1  Acute pain of left knee M25 562                   Assessment  Impairments: abnormal or restricted ROM, impaired physical strength, lacks appropriate home exercise program and pain with function    Assessment details: Patient reports she has left pain that began on  pushing a rack loaded with weight and then realized the wheels were frozen to floor, she felt a pain on the inside of the left knee at that time, she reports the pain was a 9/10, she went home that day  She reported it the next day and then went to see the MD the following day, she is going to see the Workers comp doctor at the [employer]  She had an xray and MRI done at that time and they reported finding no damage in the knee, the doctor would like her to go through physical therapy before going to an orthopedic doctor  She reports all of her pain is on the inside of the knee with some pain behind the knee cap, she reports no numbness or tingling at the knee of down to the foot  She works as a home , 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, 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, squatting and other functional activities, she is currently on "light duty" at work, and she finds that she has difficulty with bending over and squatting, she feels that her knee is going to lock when she bends down  Demonstrates normal range of motion other than into ankle dorsiflexion due to calf tightness  Some LE pain limitations noted in all LE motions secondary to pain, and some limits in patellar ROM  Positive tests for meniscus pathology and complaints of joint line tenderness medially  Prognosis: good    Goals  ST- demonstrate compliancy with HEP in 1 week     Decrease reported pain to 3/10 at worst and with activity within 2 weeks   Decrease reports of L knee swelling within 3 weeks LT- Improve FOTO score to specified value to improve patients perceived benefit of therapy, in 8 weeks   Improve L LE strength to 4+/5 with all activities to improve ability to complete ADLs at home, and normal work duties, within 6 weeks  Return to full duty at work with no complaints of pain or fatigue in the L knee, within 8 weeks  Plan  Patient would benefit from: skilled physical therapy  Referral necessary: No  Planned modality interventions: cryotherapy, TENS and thermotherapy: hydrocollator packs  Planned therapy interventions: ADL training, balance, balance/weight bearing training, gait training, manual therapy, joint mobilization, neuromuscular re-education, strengthening, stretching, therapeutic activities and therapeutic exercise  Frequency: 2x week  Duration in weeks: 8  Plan of Care beginning date: 2018  Plan of Care expiration date: 2018  Treatment plan discussed with: patient  Plan details: Physical therapy with focus on there ex and manual therapy to improve ability to complete tasks around the house and complete functional activities, use of modalities as needed           Subjective Evaluation    History of Present Illness  Date of onset: 10/1/2018  Mechanism of injury: Pushing cart frozen to floor   Pain  Current pain ratin  At best pain ratin  At worst pain ratin  Location: L knee   Quality: burning and throbbing  Relieving factors: rest  Aggravating factors: walking, standing and sitting    Patient Goals  Patient goals for therapy: decreased pain, independence with ADLs/IADLs and return to work  Patient goal: return to full duty at work         Objective     General Comments     Knee Comments  Ttp along medial joint line of knee with some complaints into back of knee     rom  Flexion L=WFL R= WFL  Extension L=WFL R= WFL  DF L=8 R=WFL  PF WFL B/L     mmt  R side LE grossly measured as 4+/5*   Left  Hip flexion 3+/5  Hip abduction 4/5  Hip adduction 4/5  Knee flexion 4/5  Knee extension 4/5   Ankle DF 4/5  Ankle PF 4/5     Special tests (+) McMurrays, Thessalys  (-) anterior drawer, posterior drawer, valgus/varus stress test, McConnells     Joint play normal tibiofemoral, some restrictions medially and laterally with patellar joint play       Flowsheet Rows      Most Recent Value   PT/OT G-Codes   Current Score  45   Projected Score  59   FOTO information reviewed  Yes   Assessment Type  Evaluation   G code set  Other PT/OT Primary   Other PT Primary Current Status ()  CK   Other PT Primary Goal Status ()  CK          Precautions: none    Daily Treatment Diary     Manual              IASTM medial L knee             Patellar ROM                                                        Exercise Diary              bike             gastroc self-stretch             Hamstring self stretch             SLR, SLR w/ ER             LAQ             Mini squats  nv           SHC             Standing TKE w/ TB             clamshells             Reverse clamshells  nv                                                                                                                                                 Modalities              CP PRN

## 2018-11-05 NOTE — LETTER
2018    Malini Turner MD  [facility]  [facility]    Patient: Radha Jordan   YOB: 1965   Date of Visit: 2018     Encounter Diagnosis     ICD-10-CM    1  Acute pain of left knee M25 562        Dear Dr Marialuisa Carbone:    Please review the attached Plan of Care from [facility] recent visit  Please verify that you agree therapy should continue by signing the attached document and sending it back to our office  If you have any questions or concerns, please don't hesitate to call  Sincerely,    Dima Craft PT      Referring Provider:      I certify that I have read the below Plan of Care and certify the need for these services furnished under this plan of treatment while under my care  Malini Turner MD  [facility]  [address]  [place]          PT Evaluation     Today's date: 2018  Patient name: Radha Jordan  : 1965  MRN: [id number]  Referring provider: Mundo Dow MD  Dx:   Encounter Diagnosis     ICD-10-CM    1  Acute pain of left knee M25 562                   Assessment  Impairments: abnormal or restricted ROM, impaired physical strength, lacks appropriate home exercise program and pain with function    Assessment details: Patient reports she has left pain that began on  pushing a rack loaded with weight and then realized the wheels were frozen to floor, she felt a pain on the inside of the left knee at that time, she reports the pain was a 9/10, she went home that day  She reported it the next day and then went to see the MD the following day, she is going to see the Workers comp doctor at the [employer]  She had an xray and MRI done at that time and they reported finding no damage in the knee, the doctor would like her to go through physical therapy before going to an orthopedic doctor   She reports all of her pain is on the inside of the knee with some pain behind the knee cap, she reports no numbness or tingling at the knee of down to the foot  She works as a home , 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, 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, squatting and other functional activities, she is currently on "light duty" at work, and she finds that she has difficulty with bending over and squatting, she feels that her knee is going to lock when she bends down  Demonstrates normal range of motion other than into ankle dorsiflexion due to calf tightness  Some LE pain limitations noted in all LE motions secondary to pain, and some limits in patellar ROM  Positive tests for meniscus pathology and complaints of joint line tenderness medially  Prognosis: good    Goals  ST- demonstrate compliancy with HEP in 1 week  Decrease reported pain to 3/10 at worst and with activity within 2 weeks   Decrease reports of L knee swelling within 3 weeks     LT- Improve FOTO score to specified value to improve patients perceived benefit of therapy, in 8 weeks   Improve L LE strength to 4+/5 with all activities to improve ability to complete ADLs at home, and normal work duties, within 6 weeks  Return to full duty at work with no complaints of pain or fatigue in the L knee, within 8 weeks       Plan  Patient would benefit from: skilled physical therapy  Referral necessary: No  Planned modality interventions: cryotherapy, TENS and thermotherapy: hydrocollator packs  Planned therapy interventions: ADL training, balance, balance/weight bearing training, gait training, manual therapy, joint mobilization, neuromuscular re-education, strengthening, stretching, therapeutic activities and therapeutic exercise  Frequency: 2x week  Duration in weeks: 8  Plan of Care beginning date: 11/5/2018  Plan of Care expiration date: 12/31/2018  Treatment plan discussed with: patient  Plan details: Physical therapy with focus on there ex and manual therapy to improve ability to complete tasks around the house and complete functional activities, use of modalities as needed           Subjective Evaluation    History of Present Illness  Date of onset: 10/1/2018  Mechanism of injury: Pushing cart frozen to floor   Pain  Current pain ratin  At best pain ratin  At worst pain ratin  Location: L knee   Quality: burning and throbbing  Relieving factors: rest  Aggravating factors: walking, standing and sitting    Patient Goals  Patient goals for therapy: decreased pain, independence with ADLs/IADLs and return to work  Patient goal: return to full duty at work         Objective     General Comments     Knee Comments  Ttp along medial joint line of knee with some complaints into back of knee     rom  Flexion L=WFL R= WFL  Extension L=WFL R= WFL  DF L=8 R=WFL  PF WFL B/L     mmt  R side LE grossly measured as 4+/5*   Left  Hip flexion 3+/5  Hip abduction 4/5  Hip adduction 4/5  Knee flexion 4/5  Knee extension 4/5   Ankle DF 4/5  Ankle PF 4/5     Special tests (+) McMurrays, Thessalys  (-) anterior drawer, posterior drawer, valgus/varus stress test, McConnells     Joint play normal tibiofemoral, some restrictions medially and laterally with patellar joint play       Flowsheet Rows      Most Recent Value   PT/OT G-Codes   Current Score  45   Projected Score  61   FOTO information reviewed  Yes   Assessment Type  Evaluation   G code set  Other PT/OT Primary   Other PT Primary Current Status ()  CK   Other PT Primary Goal Status ()  CK          Precautions: none    Daily Treatment Diary     Manual              IASTM medial L knee             Patellar ROM                                                        Exercise Diary              bike             gastroc self-stretch             Hamstring self stretch             SLR, SLR w/ ER             LAQ             Mini squats  nv           SHC             Standing TKE w/ TB             clamshells             Reverse clamshells  nv Modalities              CP PRN

## 2018-11-06 ENCOUNTER — OFFICE VISIT (OUTPATIENT)
Dept: PHYSICAL THERAPY | Facility: REHABILITATION | Age: 53
End: 2018-11-06
Payer: OTHER MISCELLANEOUS

## 2018-11-06 DIAGNOSIS — M25.562 ACUTE PAIN OF LEFT KNEE: Primary | ICD-10-CM

## 2018-11-06 PROCEDURE — 97140 MANUAL THERAPY 1/> REGIONS: CPT

## 2018-11-06 PROCEDURE — 97112 NEUROMUSCULAR REEDUCATION: CPT

## 2018-11-06 PROCEDURE — 97110 THERAPEUTIC EXERCISES: CPT

## 2018-11-06 NOTE — PROGRESS NOTES
1`Daily Note     Today's date: 2018  Patient name: Angela Man  : 1965  MRN: [id number]  Referring provider: Maximo Adames MD  Dx:   Encounter Diagnosis     ICD-10-CM    1  Acute pain of left knee M25 562                   Subjective: Pt reports that pain is a little elevated in L knee today, states that she believes it is due to weather  Objective: See treatment diary below      Precautions: none    Daily Treatment Diary     Manual              IASTM medial L knee nv            Patellar ROM KP                                                       Exercise Diary              bike 8'            gastroc self-stretch 3x30"            Hamstring self stretch 3x30"            SLR, SLR w/ ER 15ea L            LAQ 2x15            Mini squats 15            SHC 2x10 ea            Standing TKE w/ TB 2x10 BTB            clamshells 2x15            Reverse clamshells  nv                                                                                                                                                 Modalities              CP PRN                                              Assessment: Tolerated treatment well  Patient would benefit from continued PT  Pt did well with first treat  Pt has some pain with ER SLR, but was able to complete exercise  Pt was not wearing clothing that allowed for ISTM this session, will have pants that can roll up or shorts NV  Reviewed HEP, pt understood and claimed compliance  Pt  1:1 with PTA for entirety  Plan: Continue per plan of care

## 2018-11-07 ENCOUNTER — OFFICE VISIT (OUTPATIENT)
Dept: PHYSICAL THERAPY | Facility: REHABILITATION | Age: 53
End: 2018-11-07
Payer: OTHER MISCELLANEOUS

## 2018-11-07 DIAGNOSIS — M25.562 ACUTE PAIN OF LEFT KNEE: Primary | ICD-10-CM

## 2018-11-07 PROCEDURE — 97140 MANUAL THERAPY 1/> REGIONS: CPT | Performed by: PHYSICAL THERAPIST

## 2018-11-07 PROCEDURE — 97110 THERAPEUTIC EXERCISES: CPT | Performed by: PHYSICAL THERAPIST

## 2018-11-07 PROCEDURE — 97112 NEUROMUSCULAR REEDUCATION: CPT | Performed by: PHYSICAL THERAPIST

## 2018-11-07 PROCEDURE — 97530 THERAPEUTIC ACTIVITIES: CPT | Performed by: PHYSICAL THERAPIST

## 2018-11-07 NOTE — PROGRESS NOTES
Daily Note     Today's date: 2018  Patient name: Radha Jordan  : 1965  MRN: [id number]  Referring provider: Mundo Dow MD  Dx:   Encounter Diagnosis     ICD-10-CM    1  Acute pain of left knee M25 562                   Subjective: Reports her knee feels sore today after returning to work earlier      Objective: See treatment diary below    Precautions: none    Daily Treatment Diary     Manual             IASTM medial L knee nv            Patellar ROM KP CW                                                      Exercise Diary             bike 8' 10'           gastroc self-stretch 3x30" 3x30"           Hamstring self stretch 3x30" 3x30"           SLR, SLR w/ ER 15ea L 2x10 ea 2# w/ SLR            LAQ 2x15 2x15 2#           Mini squats 15 2x10           SHC 2x10 ea 2x10 B/L           Standing TKE w/ TB 2x10 BTB 2x15 BTB           clamshells 2x15 2x10 OTB            Reverse clamshells  nv           Step ups  nv                                                                                                                                    Modalities              CP PRN                                                Assessment: Tolerated treatment well  Patient would benefit from continued PT, able to complete previous and new exercises today with no complaints, able to demonstrate exercises in HEP, begin working on functional exercises such as steps nv  Unable to perform IASTM today due to patient wearing jeans she couldn't roll up  Plan: Continue per plan of care

## 2018-11-13 ENCOUNTER — OFFICE VISIT (OUTPATIENT)
Dept: PHYSICAL THERAPY | Facility: REHABILITATION | Age: 53
End: 2018-11-13
Payer: OTHER MISCELLANEOUS

## 2018-11-13 DIAGNOSIS — M25.562 ACUTE PAIN OF LEFT KNEE: Primary | ICD-10-CM

## 2018-11-13 PROCEDURE — 97530 THERAPEUTIC ACTIVITIES: CPT

## 2018-11-13 PROCEDURE — 97112 NEUROMUSCULAR REEDUCATION: CPT

## 2018-11-13 PROCEDURE — 97140 MANUAL THERAPY 1/> REGIONS: CPT

## 2018-11-13 PROCEDURE — 97110 THERAPEUTIC EXERCISES: CPT

## 2018-11-13 NOTE — PROGRESS NOTES
Daily Note     Today's date: 2018  Patient name: Octavia Espinoza  : 1965  MRN: [id number]  Referring provider: Lonzell Goldmann, MD  Dx:   Encounter Diagnosis     ICD-10-CM    1  Acute pain of left knee M25 562                   Subjective: Pt reports she's worked 11 days straight, and is feeling pain in knee as a result of not being able to rest      Objective: See treatment diary below      Precautions: none    Daily Treatment Diary     Manual            IASTM medial L knee nv            Patellar ROM KP CW KP                                                     Exercise Diary            bike 8' 10' 10'          gastroc self-stretch 3x30" 3x30" 3x30"          Hamstring self stretch 3x30" 3x30" 3x30"          SLR, SLR w/ ER 15ea L 2x10 ea 2# w/ SLR  2x15 ea 2# on SLR          LAQ 2x15 2x15 2# 2x15 2#          Mini squats 15 2x10 2x15          SHC 2x10 ea 2x10 B/L 2x15 ea b/l          Standing TKE w/ TB 2x10 BTB 2x15 BTB 2x15 BTB          clamshells 2x15 2x10 OTB  2x15 OTB          Reverse clamshells  nv np          Step ups  nv 20 fsu/lsu 6" L                                                                                                                                   Modalities              CP PRN                                              Assessment: Tolerated treatment well  Patient would benefit from continued PT  Pt able to progress exercises this session, has most difficulty with ER SLR  Pt  able to complete all exercises with no increase in pain during or after session  Pt  1:1 with PTA for entirety  Plan: Continue per plan of care

## 2018-11-15 ENCOUNTER — APPOINTMENT (OUTPATIENT)
Dept: PHYSICAL THERAPY | Facility: REHABILITATION | Age: 53
End: 2018-11-15
Payer: OTHER MISCELLANEOUS

## 2018-11-19 ENCOUNTER — OFFICE VISIT (OUTPATIENT)
Dept: PHYSICAL THERAPY | Facility: REHABILITATION | Age: 53
End: 2018-11-19
Payer: OTHER MISCELLANEOUS

## 2018-11-19 DIAGNOSIS — M25.562 ACUTE PAIN OF LEFT KNEE: Primary | ICD-10-CM

## 2018-11-19 PROCEDURE — 97110 THERAPEUTIC EXERCISES: CPT | Performed by: PHYSICAL THERAPIST

## 2018-11-19 PROCEDURE — 97112 NEUROMUSCULAR REEDUCATION: CPT | Performed by: PHYSICAL THERAPIST

## 2018-11-19 PROCEDURE — 97530 THERAPEUTIC ACTIVITIES: CPT | Performed by: PHYSICAL THERAPIST

## 2018-11-19 PROCEDURE — 97140 MANUAL THERAPY 1/> REGIONS: CPT | Performed by: PHYSICAL THERAPIST

## 2018-11-19 NOTE — PROGRESS NOTES
Daily Note     Today's date: 2018  Patient name: Trace Montano  : 1965  MRN: [id number]  Referring provider: Isaac Hicks MD  Dx:   Encounter Diagnosis     ICD-10-CM    1  Acute pain of left knee M25 562                   Subjective: Reports she still has good days and bad days but her knee feels alright today       Objective: See treatment diary below    Precautions: none    Daily Treatment Diary     Manual           IASTM medial L knee nv   CW         Patellar ROM KP CW KP CW                                                    Exercise Diary           bike 8' 10' 10' 10'         gastroc self-stretch 3x30" 3x30" 3x30" 3x30"          Hamstring self stretch 3x30" 3x30" 3x30" 3x30"         SLR, SLR w/ ER 15ea L 2x10 ea 2# w/ SLR  2x15 ea 2# on SLR 2x15 ea 2#         LAQ 2x15 2x15 2# 2x15 2# 2x15 2#         Mini squats 15 2x10 2x15 2x15         SHC 2x10 ea 2x10 B/L 2x15 ea b/l D/C         Standing TKE w/ TB 2x10 BTB 2x15 BTB 2x15 BTB 2x20 BTB         clamshells 2x15 2x10 OTB  2x15 OTB 2x15 OTB         Reverse clamshells  nv np 15x OTB          Step ups  nv 20 fsu/lsu 6" L 20 fsu/lsu 6" L                                                                                                                                  Modalities              CP PRN                                              Assessment: Tolerated treatment well  Patient would benefit from continued PT, trialed IASTM to medial retinaculum of L knee, patient had some pain over spot but was able to tolerate, cntinue nv  Plan: Continue per plan of care

## 2018-11-21 ENCOUNTER — OFFICE VISIT (OUTPATIENT)
Dept: PHYSICAL THERAPY | Facility: REHABILITATION | Age: 53
End: 2018-11-21
Payer: OTHER MISCELLANEOUS

## 2018-11-21 DIAGNOSIS — M25.562 ACUTE PAIN OF LEFT KNEE: Primary | ICD-10-CM

## 2018-11-21 PROCEDURE — 97112 NEUROMUSCULAR REEDUCATION: CPT

## 2018-11-21 PROCEDURE — 97140 MANUAL THERAPY 1/> REGIONS: CPT

## 2018-11-21 PROCEDURE — 97530 THERAPEUTIC ACTIVITIES: CPT

## 2018-11-21 PROCEDURE — 97110 THERAPEUTIC EXERCISES: CPT

## 2018-11-21 NOTE — PROGRESS NOTES
Daily Note     Today's date: 2018  Patient name: Dioni Ovalle  : 1965  MRN: [id number]  Referring provider: Angela Lee MD  Dx:   Encounter Diagnosis     ICD-10-CM    1  Acute pain of left knee M25 562                   Subjective: Pt states she saw her doctor, who asked her what she thinks is wrong and what she thinks course of treatment should be  Pt claims doctor advised PT for another month, and stated that "physical therapist would know best course of action from here"  Objective: See treatment diary below      Precautions: none    Daily Treatment Diary     Manual          IASTM medial L knee nv   CW KP        Patellar ROM KP CW KP CW KP                                                   Exercise Diary          bike 8' 10' 10' 10' 10'        gastroc self-stretch 3x30" 3x30" 3x30" 3x30"  3x30"        Hamstring self stretch 3x30" 3x30" 3x30" 3x30" 3x30"        SLR, SLR w/ ER 15ea L 2x10 ea 2# w/ SLR  2x15 ea 2# on SLR 2x15 ea 2# 2x15 ea 2#        LAQ 2x15 2x15 2# 2x15 2# 2x15 2# 2x15 2#        Mini squats 15 2x10 2x15 2x15 2x15        Standing TKE w/ TB 2x10 BTB 2x15 BTB 2x15 BTB 2x20 BTB 2x20 BTB        clamshells 2x15 2x10 OTB  2x15 OTB 2x15 OTB 2x15 ea OTB        Reverse clamshells  nv np 15x OTB  15x otb        Step ups  nv 20 fsu/lsu 6" L 20 fsu/lsu 6" L 20 fsu/lsu 6" L                                                                                                                                 Modalities              CP PRN                                              Assessment: Tolerated treatment well  Patient would benefit from continued PT  Pt had some tenderness to touch with ISTM in medial aspect of L knee during ISTM  Able to complete all other exercises with no increase in pain during or after session  Pt 1:1 with PTA [id number], IEP for remainder  Plan: Continue per plan of care

## 2018-11-27 ENCOUNTER — APPOINTMENT (OUTPATIENT)
Dept: PHYSICAL THERAPY | Facility: REHABILITATION | Age: 53
End: 2018-11-27
Payer: OTHER MISCELLANEOUS

## 2018-11-28 ENCOUNTER — OFFICE VISIT (OUTPATIENT)
Dept: PHYSICAL THERAPY | Facility: REHABILITATION | Age: 53
End: 2018-11-28
Payer: OTHER MISCELLANEOUS

## 2018-11-28 DIAGNOSIS — M25.562 ACUTE PAIN OF LEFT KNEE: Primary | ICD-10-CM

## 2018-11-28 PROCEDURE — 97110 THERAPEUTIC EXERCISES: CPT

## 2018-11-28 PROCEDURE — 97140 MANUAL THERAPY 1/> REGIONS: CPT

## 2018-11-28 PROCEDURE — 97530 THERAPEUTIC ACTIVITIES: CPT

## 2018-11-28 PROCEDURE — 97112 NEUROMUSCULAR REEDUCATION: CPT

## 2018-11-28 NOTE — PROGRESS NOTES
Daily Note     Today's date: 2018  Patient name: Christina Estevez  : 1965  MRN: [id number]  Referring provider: Pita Gaston MD  Dx:   Encounter Diagnosis     ICD-10-CM    1  Acute pain of left knee M25 562                   Subjective: Pt reports no change in status since last visit, says most pain comes from twisting and loading motions  Objective: See treatment diary below      Precautions: none    Daily Treatment Diary     Manual         IASTM medial L knee nv   CW KP KP       Patellar ROM KP CW KP CW KP KP                                                  Exercise Diary         bike 8' 10' 10' 10' 10' 10'       gastroc self-stretch 3x30" 3x30" 3x30" 3x30"  3x30" 3x30"       Hamstring self stretch 3x30" 3x30" 3x30" 3x30" 3x30" 3x30"       SLR, SLR w/ ER 15ea L 2x10 ea 2# w/ SLR  2x15 ea 2# on SLR 2x15 ea 2# 2x15 ea 2# 2x15 ea 2#       LAQ 2x15 2x15 2# 2x15 2# 2x15 2# 2x15 2# 2x15 2#       Mini squats 15 2x10 2x15 2x15 2x15 2x15       Standing TKE w/ TB 2x10 BTB 2x15 BTB 2x15 BTB 2x20 BTB 2x20 BTB 2x20 BTB       clamshells 2x15 2x10 OTB  2x15 OTB 2x15 OTB 2x15 ea OTB 2x15 ea OTB       Reverse clamshells  nv np 15x OTB  15x otb 15x otb       Step ups  nv 20 fsu/lsu 6" L 20 fsu/lsu 6" L 20 fsu/lsu 6" L 20 fsu/lsu 6" L                                                                                                                                Modalities              CP PRN                                              Assessment: Tolerated treatment well  Patient would benefit from continued PT  Pt had some touch tenderness with ISTM to medial aspect of L patella  Pt remains challenged with SLR, particularly ER SLR  Pt  able to complete all exercises with no increase in pain during or after session  Pt 1:1 with PTA [id number], IEP for remainder  Plan: Continue per plan of care

## 2018-11-29 ENCOUNTER — OFFICE VISIT (OUTPATIENT)
Dept: PHYSICAL THERAPY | Facility: REHABILITATION | Age: 53
End: 2018-11-29
Payer: OTHER MISCELLANEOUS

## 2018-11-29 DIAGNOSIS — M25.562 ACUTE PAIN OF LEFT KNEE: Primary | ICD-10-CM

## 2018-11-29 PROCEDURE — 97530 THERAPEUTIC ACTIVITIES: CPT

## 2018-11-29 PROCEDURE — 97110 THERAPEUTIC EXERCISES: CPT

## 2018-11-29 PROCEDURE — 97112 NEUROMUSCULAR REEDUCATION: CPT

## 2018-11-29 PROCEDURE — 97140 MANUAL THERAPY 1/> REGIONS: CPT

## 2018-11-29 NOTE — PROGRESS NOTES
Daily Note     Today's date: 2018  Patient name: Trace Montano  : 1965  MRN: [id number]  Referring provider: Isaac Hicks MD  Dx:   Encounter Diagnosis     ICD-10-CM    1  Acute pain of left knee M25 562                   Subjective: Pt reports no change since last visit      Objective: See treatment diary below      Precautions: none    Daily Treatment Diary     Manual        IASTM medial L knee nv   CW KP KP KP      Patellar ROM KP CW KP CW KP KP KP                                                 Exercise Diary        bike 8' 10' 10' 10' 10' 10' 10'      gastroc self-stretch 3x30" 3x30" 3x30" 3x30"  3x30" 3x30" 3x30"       Hamstring self stretch 3x30" 3x30" 3x30" 3x30" 3x30" 3x30" 3x30"      SLR, SLR w/ ER 15ea L 2x10 ea 2# w/ SLR  2x15 ea 2# on SLR 2x15 ea 2# 2x15 ea 2# 2x15 ea 2# 2x15 ea 2#      LAQ 2x15 2x15 2# 2x15 2# 2x15 2# 2x15 2# 2x15 2# 2x15 2#      Mini squats 15 2x10 2x15 2x15 2x15 2x15 2x15      Standing TKE w/ TB 2x10 BTB 2x15 BTB 2x15 BTB 2x20 BTB 2x20 BTB 2x20 BTB 2x20 btb      clamshells 2x15 2x10 OTB  2x15 OTB 2x15 OTB 2x15 ea OTB 2x15 ea OTB 2x15 ea otb      Reverse clamshells  nv np 15x OTB  15x otb 15x otb 15x otb      Step ups  nv 20 fsu/lsu 6" L 20 fsu/lsu 6" L 20 fsu/lsu 6" L 20 fsu/lsu 6" L 20 fsu/lsu 6" L                                                                                                                               Modalities              CP PRN                                            Assessment: Tolerated treatment well  Patient would benefit from continued PT  Pt continues to have difficulty with ER SLR, able to progress weights on LAQ without complaint  Pt  able to complete all exercises with no increase in pain during or after session  Pt  1:1 with PTA for entirety  Plan: Continue per plan of care

## 2018-12-05 ENCOUNTER — OFFICE VISIT (OUTPATIENT)
Dept: PHYSICAL THERAPY | Facility: REHABILITATION | Age: 53
End: 2018-12-05
Payer: OTHER MISCELLANEOUS

## 2018-12-05 DIAGNOSIS — M25.562 ACUTE PAIN OF LEFT KNEE: Primary | ICD-10-CM

## 2018-12-05 PROCEDURE — 97140 MANUAL THERAPY 1/> REGIONS: CPT

## 2018-12-05 PROCEDURE — 97110 THERAPEUTIC EXERCISES: CPT

## 2018-12-05 PROCEDURE — 97112 NEUROMUSCULAR REEDUCATION: CPT

## 2018-12-05 PROCEDURE — 97530 THERAPEUTIC ACTIVITIES: CPT

## 2018-12-05 NOTE — PROGRESS NOTES
Daily Note     Today's date: 2018  Patient name: Gerson Pool  : 1965  MRN: [id number]  Referring provider: Alfonzo Goodpasture, MD  Dx:   Encounter Diagnosis     ICD-10-CM    1  Acute pain of left knee M25 562                   Subjective: Pt reports some increased pain upon arrival today, states she's been doing more at work       Objective: See treatment diary below      Precautions: none    Daily Treatment Diary     Manual   12/5     IASTM medial L knee nv   CW KP KP KP KP     Patellar ROM KP CW KP CW KP KP KP KP                                                Exercise Diary       bike 8' 10' 10' 10' 10' 10' 10' 10'     gastroc self-stretch 3x30" 3x30" 3x30" 3x30"  3x30" 3x30" 3x30"  3x30"     Hamstring self stretch 3x30" 3x30" 3x30" 3x30" 3x30" 3x30" 3x30" 3x30"     SLR, SLR w/ ER 15ea L 2x10 ea 2# w/ SLR  2x15 ea 2# on SLR 2x15 ea 2# 2x15 ea 2# 2x15 ea 2# 2x15 ea 2# 2x15 ea 2#     LAQ 2x15 2x15 2# 2x15 2# 2x15 2# 2x15 2# 2x15 2# 2x15 2# 2x15 2#     Mini squats 15 2x10 2x15 2x15 2x15 2x15 2x15 2x15     Standing TKE w/ TB 2x10 BTB 2x15 BTB 2x15 BTB 2x20 BTB 2x20 BTB 2x20 BTB 2x20 btb 2x20 btb     clamshells 2x15 2x10 OTB  2x15 OTB 2x15 OTB 2x15 ea OTB 2x15 ea OTB 2x15 ea otb 2x15 ea otb     Reverse clamshells  nv np 15x OTB  15x otb 15x otb 15x otb 15x otb     Step ups  nv 20 fsu/lsu 6" L 20 fsu/lsu 6" L 20 fsu/lsu 6" L 20 fsu/lsu 6" L 20 fsu/lsu 6" L 20 fsu/lsu 6" L                                                                                                                              Modalities              CP PRN                                            Assessment: Tolerated treatment well  Patient would benefit from continued PT  Pt was able to complete all exercises with no increase in pain, remains challenged with ER SLR  Pt 1:1 with PTA for entirety  Plan: Continue per plan of care

## 2018-12-10 ENCOUNTER — OFFICE VISIT (OUTPATIENT)
Dept: PHYSICAL THERAPY | Facility: REHABILITATION | Age: 53
End: 2018-12-10
Payer: OTHER MISCELLANEOUS

## 2018-12-10 DIAGNOSIS — M25.562 ACUTE PAIN OF LEFT KNEE: Primary | ICD-10-CM

## 2018-12-10 PROCEDURE — 97110 THERAPEUTIC EXERCISES: CPT | Performed by: PHYSICAL THERAPIST

## 2018-12-10 PROCEDURE — 97140 MANUAL THERAPY 1/> REGIONS: CPT | Performed by: PHYSICAL THERAPIST

## 2018-12-10 PROCEDURE — 97112 NEUROMUSCULAR REEDUCATION: CPT | Performed by: PHYSICAL THERAPIST

## 2018-12-10 NOTE — PROGRESS NOTES
Daily Note     Today's date: 12/10/2018  Patient name: Raymond Adler  : 1965  MRN: [id number]  Referring provider: Checo Gutierrez MD  Dx:   Encounter Diagnosis     ICD-10-CM    1  Acute pain of left knee M25 562                   Subjective: Reports having a lot of pain after increasing in resistance last visit, upon review there was no increase in resistance in any exercise last week  Objective: See treatment diary below    Precautions: none    Daily Treatment Diary     Manual  11/21 11/28 11/29 12/5 12/10    IASTM medial L knee KP KP KP KP CW    Patellar ROM KP KP KP KP CW                                   Exercise Diary  11/19 11/21 11/28 11/29 12/5 12/10    bike 10' 10' 10' 10' 10' 10'    gastroc self-stretch 3x30"  3x30" 3x30" 3x30"  3x30" 3x30"    Hamstring self stretch 3x30" 3x30" 3x30" 3x30" 3x30" 3x30"    SLR, SLR w/ ER 2x15 ea 2# 2x15 ea 2# 2x15 ea 2# 2x15 ea 2# 2x15 ea 2# 2x10 ea    LAQ 2x15 2# 2x15 2# 2x15 2# 2x15 2# 2x15 2# 2x20     Mini squats 2x15 2x15 2x15 2x15 2x15 2x15    Standing TKE w/ TB 2x20 BTB 2x20 BTB 2x20 BTB 2x20 btb 2x20 btb 2x20 BTB    clamshells 2x15 OTB 2x15 ea OTB 2x15 ea OTB 2x15 ea otb 2x15 ea otb np    Reverse clamshells 15x OTB  15x otb 15x otb 15x otb 15x otb np    Step ups 20 fsu/lsu 6" L 20 fsu/lsu 6" L 20 fsu/lsu 6" L 20 fsu/lsu 6" L 20 fsu/lsu 6" L 20 fsu/lsu 6"                                                                                                   Modalities              CP PRN                                            Assessment: Tolerated treatment well  Patient would benefit from continued PT, did not perform clamshells or reverse clamshells and avoided resistance on exercise to look for improvement nv  Plan: Continue per plan of care

## 2018-12-17 ENCOUNTER — OFFICE VISIT (OUTPATIENT)
Dept: PHYSICAL THERAPY | Facility: REHABILITATION | Age: 53
End: 2018-12-17
Payer: OTHER MISCELLANEOUS

## 2018-12-17 DIAGNOSIS — M25.562 ACUTE PAIN OF LEFT KNEE: Primary | ICD-10-CM

## 2018-12-17 PROCEDURE — 97112 NEUROMUSCULAR REEDUCATION: CPT

## 2018-12-17 PROCEDURE — 97530 THERAPEUTIC ACTIVITIES: CPT

## 2018-12-17 PROCEDURE — 97110 THERAPEUTIC EXERCISES: CPT

## 2018-12-17 PROCEDURE — 97140 MANUAL THERAPY 1/> REGIONS: CPT

## 2018-12-17 NOTE — PROGRESS NOTES
Daily Note     Today's date: 2018  Patient name: Raymond Adler  : 1965  MRN: [id number]  Referring provider: Checo Gutierrez MD  Dx:   Encounter Diagnosis     ICD-10-CM    1  Acute pain of left knee M25 562                   Subjective: Pt reports that knee is feeling worse, says she feels like she's back to square one  Pt has consulted with doctor, was told it was possible arthritis, states she is not interested in cortisone injections  Objective: See treatment diary below      Precautions: none    Daily Treatment Diary     Manual  11/21 11/28 11/29 12/5 12/10 12/17   IASTM medial L knee KP KP KP KP CW KP   Patellar ROM KP KP KP KP CW KP                                  Exercise Diary  11/19 11/21 11/28 11/29 12/5 12/10 12/17   bike 10' 10' 10' 10' 10' 10' 10'   gastroc self-stretch 3x30"  3x30" 3x30" 3x30"  3x30" 3x30" 3x30"   Hamstring self stretch 3x30" 3x30" 3x30" 3x30" 3x30" 3x30" 3x30"   SLR, SLR w/ ER 2x15 ea 2# 2x15 ea 2# 2x15 ea 2# 2x15 ea 2# 2x15 ea 2# 2x10 ea 2x10 slr, no weight   LAQ 2x15 2# 2x15 2# 2x15 2# 2x15 2# 2x15 2# 2x20     Mini squats 2x15 2x15 2x15 2x15 2x15 2x15 2x15   Standing TKE w/ TB 2x20 BTB 2x20 BTB 2x20 BTB 2x20 btb 2x20 btb 2x20 BTB 2x20 BTB   clamshells 2x15 OTB 2x15 ea OTB 2x15 ea OTB 2x15 ea otb 2x15 ea otb np np   Reverse clamshells 15x OTB  15x otb 15x otb 15x otb 15x otb np np   Step ups 20 fsu/lsu 6" L 20 fsu/lsu 6" L 20 fsu/lsu 6" L 20 fsu/lsu 6" L 20 fsu/lsu 6" L 20 fsu/lsu 6"  20 fsu/lsu 6"                                                                                                 Modalities              CP PRN                                              Assessment: Tolerated treatment well  Patient would benefit from continued PT  Withheld weights on SLR, pt did well  Did not perform ER SLR this session, pt states SLR with weight is what causes most pain during session    Pt  able to complete all exercises with no increase in pain during or after session  Pt  1:1 with PTA for entirety  Plan: Continue per plan of care

## 2018-12-24 ENCOUNTER — OFFICE VISIT (OUTPATIENT)
Dept: PHYSICAL THERAPY | Facility: REHABILITATION | Age: 53
End: 2018-12-24
Payer: OTHER MISCELLANEOUS

## 2018-12-24 DIAGNOSIS — M25.562 ACUTE PAIN OF LEFT KNEE: Primary | ICD-10-CM

## 2018-12-24 PROCEDURE — G8991 OTHER PT/OT GOAL STATUS: HCPCS

## 2018-12-24 PROCEDURE — G8990 OTHER PT/OT CURRENT STATUS: HCPCS

## 2018-12-24 PROCEDURE — 97140 MANUAL THERAPY 1/> REGIONS: CPT

## 2018-12-24 PROCEDURE — 97110 THERAPEUTIC EXERCISES: CPT

## 2018-12-24 PROCEDURE — 97112 NEUROMUSCULAR REEDUCATION: CPT

## 2018-12-24 NOTE — PROGRESS NOTES
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 and it's been helping  Objective: See treatment diary below      Precautions: none    Daily Treatment Diary     Manual  11/21 11/28 11/29 12/5 12/10 12/17 12/24   IASTM medial L knee KP KP KP KP CW KP KP   Patellar ROM KP KP KP KP CW KP KP                                     Exercise Diary  11/19 11/21 11/28 11/29 12/5 12/10 12/17 12/24   bike 10' 10' 10' 10' 10' 10' 10' 10'   gastroc self-stretch 3x30"  3x30" 3x30" 3x30"  3x30" 3x30" 3x30" 3x30"   Hamstring self stretch 3x30" 3x30" 3x30" 3x30" 3x30" 3x30" 3x30" 3x30"   SLR, SLR w/ ER 2x15 ea 2# 2x15 ea 2# 2x15 ea 2# 2x15 ea 2# 2x15 ea 2# 2x10 ea 2x10 slr, no weight 2x15 slr, no weight   LAQ 2x15 2# 2x15 2# 2x15 2# 2x15 2# 2x15 2# 2x20      Mini squats 2x15 2x15 2x15 2x15 2x15 2x15 2x15 2x15   Standing TKE w/ TB 2x20 BTB 2x20 BTB 2x20 BTB 2x20 btb 2x20 btb 2x20 BTB 2x20 BTB 2x20 BTB   clamshells 2x15 OTB 2x15 ea OTB 2x15 ea OTB 2x15 ea otb 2x15 ea otb np np np   Reverse clamshells 15x OTB  15x otb 15x otb 15x otb 15x otb np np np   Step ups 20 fsu/lsu 6" L 20 fsu/lsu 6" L 20 fsu/lsu 6" L 20 fsu/lsu 6" L 20 fsu/lsu 6" L 20 fsu/lsu 6"  20 fsu/lsu 6" 20 fsu/lsu 6"                                                                                                          Modalities              CP PRN                                              Assessment: Tolerated treatment well  Patient would benefit from continued PT  Pt reports pain with ISTM to medial patellar region, states it shoots laterally in front of and inferiorly to patella  Pt able to complete all other exercises with no increase in pain    Less clicking/crunching noted throughout session today  Pt 1:1 with PTA [id number], IEP for remainder  Plan: Continue per plan of care

## 2018-12-27 ENCOUNTER — APPOINTMENT (OUTPATIENT)
Dept: PHYSICAL THERAPY | Facility: REHABILITATION | Age: 53
End: 2018-12-27
Payer: OTHER MISCELLANEOUS

## 2018-12-31 ENCOUNTER — OFFICE VISIT (OUTPATIENT)
Dept: PHYSICAL THERAPY | Facility: REHABILITATION | Age: 53
End: 2018-12-31
Payer: OTHER MISCELLANEOUS

## 2018-12-31 DIAGNOSIS — M25.562 ACUTE PAIN OF LEFT KNEE: Primary | ICD-10-CM

## 2018-12-31 PROCEDURE — 97110 THERAPEUTIC EXERCISES: CPT

## 2018-12-31 PROCEDURE — 97112 NEUROMUSCULAR REEDUCATION: CPT

## 2018-12-31 PROCEDURE — 97140 MANUAL THERAPY 1/> REGIONS: CPT

## 2018-12-31 NOTE — PROGRESS NOTES
Daily Note     Today's date: 2018  Patient name: Nery Lopez  : 1965  MRN: [id number]  Referring provider: Herson Jade MD  Dx:   Encounter Diagnosis     ICD-10-CM    1  Acute pain of left knee M25 562                   Subjective: Pt reports feeling some improvement overall as a result of decreased physical demand at work, but states pain is still happening with certain movements  Objective: See treatment diary below      Precautions: none    Daily Treatment Diary     Manual  11/21 11/28 11/29 12/5 12/10 12/17 12/24 12/31   IASTM medial L knee KP KP KP KP CW KP KP KP   Patellar ROM KP KP KP KP CW KP KP KP                                        Exercise Diary  11/19 11/21 11/28 11/29 12/5 12/10 12/17 12/24 12/31   bike 10' 10' 10' 10' 10' 10' 10' 10' 10'   gastroc self-stretch 3x30"  3x30" 3x30" 3x30"  3x30" 3x30" 3x30" 3x30" 3x30"   Hamstring self stretch 3x30" 3x30" 3x30" 3x30" 3x30" 3x30" 3x30" 3x30" 3x30"   SLR, SLR w/ ER 2x15 ea 2# 2x15 ea 2# 2x15 ea 2# 2x15 ea 2# 2x15 ea 2# 2x10 ea 2x10 slr, no weight 2x15 slr, no weight 2x15 slr, no weight   LAQ 2x15 2# 2x15 2# 2x15 2# 2x15 2# 2x15 2# 2x20    2x20   Mini squats 2x15 2x15 2x15 2x15 2x15 2x15 2x15 2x15 2x15   Standing TKE w/ TB 2x20 BTB 2x20 BTB 2x20 BTB 2x20 btb 2x20 btb 2x20 BTB 2x20 BTB 2x20 BTB 2x20 BTB   clamshells 2x15 OTB 2x15 ea OTB 2x15 ea OTB 2x15 ea otb 2x15 ea otb np np np np   Reverse clamshells 15x OTB  15x otb 15x otb 15x otb 15x otb np np np np   Step ups 20 fsu/lsu 6" L 20 fsu/lsu 6" L 20 fsu/lsu 6" L 20 fsu/lsu 6" L 20 fsu/lsu 6" L 20 fsu/lsu 6"  20 fsu/lsu 6" 20 fsu/lsu 6" 20 fsu/lsu 6"                                                                                                                   Modalities              CP PRN                                            Assessment: Tolerated treatment well  Patient would benefit from continued PT  Pt has pain in medial condyle today    Pt continues to have sensitivity to ISTM  Pt has crunching with flexion, notes no pain with crunching  Pt  able to complete all exercises with no increase in pain during or after session  Pt  1:1 with PTA for entirety  Plan: Continue per plan of care

## 2019-01-02 ENCOUNTER — OFFICE VISIT (OUTPATIENT)
Dept: PHYSICAL THERAPY | Facility: REHABILITATION | Age: 54
End: 2019-01-02
Payer: OTHER MISCELLANEOUS

## 2019-01-02 DIAGNOSIS — M25.562 ACUTE PAIN OF LEFT KNEE: Primary | ICD-10-CM

## 2019-01-02 PROCEDURE — 97110 THERAPEUTIC EXERCISES: CPT | Performed by: PHYSICAL THERAPIST

## 2019-01-02 PROCEDURE — 97112 NEUROMUSCULAR REEDUCATION: CPT | Performed by: PHYSICAL THERAPIST

## 2019-01-02 PROCEDURE — 97140 MANUAL THERAPY 1/> REGIONS: CPT | Performed by: PHYSICAL THERAPIST

## 2019-01-02 PROCEDURE — 97530 THERAPEUTIC ACTIVITIES: CPT | Performed by: PHYSICAL THERAPIST

## 2019-01-02 NOTE — PROGRESS NOTES
Daily Note     Today's date: 2019  Patient name: Master Wu  : 1965  MRN: [id number]  Referring provider: Bal Gracia MD  Dx:   Encounter Diagnosis     ICD-10-CM    1  Acute pain of left knee M25 562                   Subjective: Reports no change in status upon arrival       Objective: See treatment diary below    Precautions: none    Daily Treatment Diary     Manual  12/5 12/10 12/17 12/24 12/31 1/2   IASTM medial L knee KP CW KP KP KP np    Patellar ROM KP CW KP KP KP CW                                  Exercise Diary  12/5 12/10 12/17 12/24 12/31 1/2   bike 10' 10' 10' 10' 10' 10'   gastroc self-stretch 3x30" 3x30" 3x30" 3x30" 3x30" 3x30"   Hamstring self stretch 3x30" 3x30" 3x30" 3x30" 3x30" 3x30"    SLR, SLR w/ ER 2x15 ea 2# 2x10 ea 2x10 slr, no weight 2x15 slr, no weight 2x15 slr, no weight 2x15 slr    LAQ 2x15 2# 2x20    2x20 2x20    Mini squats 2x15 2x15 2x15 2x15 2x15 2x15   Standing TKE w/ TB 2x20 btb 2x20 BTB 2x20 BTB 2x20 BTB 2x20 BTB 2x20 BTB    clamshells 2x15 ea otb np np np np np   Reverse clamshells 15x otb np np np np np   Step ups (fwd, lat) 20 fsu/lsu 6" L 20 fsu/lsu 6"  20 fsu/lsu 6" 20 fsu/lsu 6" 20 fsu/lsu 6" 20x ea 6"    Hip 3-way storks      15x ea OTB   Lunges into bosu ball       20x                                                                      Modalities              CP PRN                                                  Assessment: Tolerated treatment well  Patient would benefit from continued PT, still performing strengthening exercises with no resistance to decrease exacerbation of pain  IASTM to L knee not performed due to lack of resolution of symptoms with tx  Will be seeing ortho MD next week  Plan: Continue per plan of care

## 2019-01-07 ENCOUNTER — APPOINTMENT (OUTPATIENT)
Dept: PHYSICAL THERAPY | Facility: REHABILITATION | Age: 54
End: 2019-01-07
Payer: OTHER MISCELLANEOUS

## 2019-01-09 ENCOUNTER — OFFICE VISIT (OUTPATIENT)
Dept: PHYSICAL THERAPY | Facility: REHABILITATION | Age: 54
End: 2019-01-09
Payer: OTHER MISCELLANEOUS

## 2019-01-09 DIAGNOSIS — M25.562 ACUTE PAIN OF LEFT KNEE: Primary | ICD-10-CM

## 2019-01-09 PROCEDURE — 97110 THERAPEUTIC EXERCISES: CPT

## 2019-01-09 PROCEDURE — 97530 THERAPEUTIC ACTIVITIES: CPT

## 2019-01-09 PROCEDURE — 97112 NEUROMUSCULAR REEDUCATION: CPT

## 2019-01-09 PROCEDURE — 97140 MANUAL THERAPY 1/> REGIONS: CPT

## 2019-01-09 NOTE — PROGRESS NOTES
Daily Note     Today's date: 2019  Patient name: Kenia Villa  : 1965  MRN: [id number]  Referring provider: Rhoda Wiseman MD  Dx:   Encounter Diagnosis     ICD-10-CM    1  Acute pain of left knee M25 562                   Subjective: Pt states no change in status, is still on reduced duty at work  Pt has appt with Dr Opal Amaya tomorrow  Objective: See treatment diary below      Precautions: none    Daily Treatment Diary     Manual  12/5 12/10 12/17 12/24 12/31 1/2 1/9   IASTM medial L knee KP CW KP KP KP np  np   Patellar ROM KP CW KP KP KP CW KP                                     Exercise Diary  12/5 12/10 12/17 12/24 12/31 1/2 1/9   bike 10' 10' 10' 10' 10' 10' 10'   gastroc self-stretch 3x30" 3x30" 3x30" 3x30" 3x30" 3x30" 3x30"   Hamstring self stretch 3x30" 3x30" 3x30" 3x30" 3x30" 3x30"  3x30"   SLR, SLR w/ ER 2x15 ea 2# 2x10 ea 2x10 slr, no weight 2x15 slr, no weight 2x15 slr, no weight 2x15 slr  2x15 slr   LAQ 2x15 2# 2x20    2x20 2x20  2x15 2#   Mini squats 2x15 2x15 2x15 2x15 2x15 2x15 2x15   Standing TKE w/ TB 2x20 btb 2x20 BTB 2x20 BTB 2x20 BTB 2x20 BTB 2x20 BTB  2x20 BTB   clamshells 2x15 ea otb np np np np np    Reverse clamshells 15x otb np np np np np    Step ups (fwd, lat) 20 fsu/lsu 6" L 20 fsu/lsu 6"  20 fsu/lsu 6" 20 fsu/lsu 6" 20 fsu/lsu 6" 20x ea 6"  20x ea 6"   Hip 3-way storks      15x ea OTB 15x ea OTB L   Lunges into bosu ball       20x 20x                                                                             Modalities              CP PRN                                              Assessment: Tolerated treatment fair  Patient would benefit from continued PT  Pt able to resume some weighted exercises this session without complaint, declined ISTM  Pt  able to complete all exercises with no increase in pain during or after session  Pt  1:1 with PTA for entirety  Plan: Continue per plan of care

## 2019-01-10 ENCOUNTER — OFFICE VISIT (OUTPATIENT)
Dept: OBGYN CLINIC | Facility: OTHER | Age: 54
End: 2019-01-10
Payer: OTHER MISCELLANEOUS

## 2019-01-10 VITALS
BODY MASS INDEX: 21.5 KG/M2 | WEIGHT: 137 LBS | HEIGHT: 67 IN | DIASTOLIC BLOOD PRESSURE: 92 MMHG | SYSTOLIC BLOOD PRESSURE: 173 MMHG

## 2019-01-10 DIAGNOSIS — M17.12 PATELLOFEMORAL ARTHRITIS OF LEFT KNEE: Primary | ICD-10-CM

## 2019-01-10 PROCEDURE — 99243 OFF/OP CNSLTJ NEW/EST LOW 30: CPT | Performed by: ORTHOPAEDIC SURGERY

## 2019-01-10 PROCEDURE — 20610 DRAIN/INJ JOINT/BURSA W/O US: CPT | Performed by: ORTHOPAEDIC SURGERY

## 2019-01-10 RX ORDER — BUPIVACAINE HYDROCHLORIDE 2.5 MG/ML
2 INJECTION, SOLUTION INFILTRATION; PERINEURAL
Status: COMPLETED | OUTPATIENT
Start: 2019-01-10 | End: 2019-01-10

## 2019-01-10 RX ORDER — BETAMETHASONE SODIUM PHOSPHATE AND BETAMETHASONE ACETATE 3; 3 MG/ML; MG/ML
6 INJECTION, SUSPENSION INTRA-ARTICULAR; INTRALESIONAL; INTRAMUSCULAR; SOFT TISSUE
Status: COMPLETED | OUTPATIENT
Start: 2019-01-10 | End: 2019-01-10

## 2019-01-10 RX ADMIN — BUPIVACAINE HYDROCHLORIDE 2 ML: 2.5 INJECTION, SOLUTION INFILTRATION; PERINEURAL at 10:07

## 2019-01-10 RX ADMIN — BETAMETHASONE SODIUM PHOSPHATE AND BETAMETHASONE ACETATE 6 MG: 3; 3 INJECTION, SUSPENSION INTRA-ARTICULAR; INTRALESIONAL; INTRAMUSCULAR; SOFT TISSUE at 10:07

## 2019-01-10 NOTE — PROGRESS NOTES
Patsie Brittle, MD personally examined the patient and reviewed the history provided  I agree with the note and the assessment and plan by Jarek Kinsey MD           Assessment  Diagnoses and all orders for this visit:    Patellofemoral arthritis of left knee    Discussion and Plan:    Patient's symptoms seem to be due to her underlying patellofemoral arthritis with a flare-up that happened at work in October  The pes anserine bursitis seen on her MRI scan does not appear to be clinically bothering her at this time  Operative versus non operative treatments were discussed with the patient  The steroid injection left knee for acute pain relief was offered for the patient  Patient elected to have this done and tolerated the procedure well bedside  All functional restrictions at work and otherwise will be handled by the patient's occupational medicine physician as has been handled up to now  If the patient ever desires surgical treatment, that would include possibly patellofemoral arthroplasty versus total knee arthroplasty and I recommend follow-up with a total joint surgeon to discuss this if necessary     Patient understands and agrees with the treatment plan    Large joint arthrocentesis  Date/Time: 1/10/2019 10:07 AM  Consent given by: patient  Timeout: Immediately prior to procedure a time out was called to verify the correct patient, procedure, equipment, support staff and site/side marked as required   Supporting Documentation  Indications: pain   Procedure Details  Location: knee - L knee  Needle size: 22 G  Ultrasound guidance: no  Medications administered: 2 mL bupivacaine 0 25 %; 6 mg betamethasone acetate-betamethasone sodium phosphate 6 (3-3) mg/mL    Patient tolerance: patient tolerated the procedure well with no immediate complications  Dressing:  Sterile dressing applied      Subjective:   Patient ID: Chapo May is a 46 y o  female      50-year-old female presents to our clinic injury at work in October 2018  The patient has been under the care of Dr Tarun Wilder and has been referred to see me in orthopedic consultation for possible injection or surgical treatment  She was pushing a heavy cart her knee gave out, feeling immediate pain  She has no history of injury to that knee or any other traumatic injuries  She has tried physical therapy which has not helped with her pain to this point  She does not feel unstable on her knee  Pain is mostly over the medial aspect of the knee with no radiation  The following portions of the patient's history were reviewed and updated as appropriate: allergies, current medications, past family history, past medical history, past social history, past surgical history and problem list     Review of Systems   Constitutional: Positive for activity change  HENT: Negative  Eyes: Negative  Respiratory: Negative  Cardiovascular: Negative  Gastrointestinal: Negative  Endocrine: Negative  Genitourinary: Negative  Musculoskeletal: Positive for arthralgias and myalgias  Skin: Negative  Allergic/Immunologic: Negative  Neurological: Negative  Hematological: Negative  Psychiatric/Behavioral: Negative  Objective:  Left Knee Exam     Tenderness   The patient is experiencing tenderness in the pes anserinus, Tenderness to palpation medial to patella  Range of Motion   Extension: 0  Flexion:     120    Tests   McMurrays:  Medial - Negative      Lateral - Negative  Lachman:  Anterior - Negative      Drawer:       Anterior - Negative     Posterior - Negative  Varus:  Negative  Valgus: Negative          Physical Exam   Constitutional: She is oriented to person, place, and time  She appears well-developed  HENT:   Head: Normocephalic  Eyes: Conjunctivae are normal    Cardiovascular:   No audible murmurs   Pulmonary/Chest: Effort normal    Musculoskeletal: She exhibits tenderness     Neurological: She is alert and oriented to person, place, and time  Skin: Skin is warm  She is not diaphoretic  I have personally reviewed pertinent films in PACS and my interpretation is as follows  MRIs of left knee show patellofemoral arthritic changes with evidence of pes anserine bursitis    ACL, PCL, mcl, and LCL intact with no evidence of meniscal pathology for

## 2019-01-10 NOTE — PATIENT INSTRUCTIONS

## 2019-01-14 ENCOUNTER — OFFICE VISIT (OUTPATIENT)
Dept: PHYSICAL THERAPY | Facility: REHABILITATION | Age: 54
End: 2019-01-14
Payer: OTHER MISCELLANEOUS

## 2019-01-14 DIAGNOSIS — M25.562 ACUTE PAIN OF LEFT KNEE: Primary | ICD-10-CM

## 2019-01-14 PROCEDURE — 97112 NEUROMUSCULAR REEDUCATION: CPT

## 2019-01-14 PROCEDURE — G8990 OTHER PT/OT CURRENT STATUS: HCPCS

## 2019-01-14 PROCEDURE — 97140 MANUAL THERAPY 1/> REGIONS: CPT

## 2019-01-14 PROCEDURE — 97110 THERAPEUTIC EXERCISES: CPT

## 2019-01-14 PROCEDURE — 97530 THERAPEUTIC ACTIVITIES: CPT

## 2019-01-14 PROCEDURE — G8991 OTHER PT/OT GOAL STATUS: HCPCS

## 2019-01-14 NOTE — PROGRESS NOTES
Daily Note     Today's date: 2019  Patient name: Kenia Villa  : 1965  MRN: [id number]  Referring provider: Rhoda Wiseman MD  Dx:   Encounter Diagnosis     ICD-10-CM    1  Acute pain of left knee M25 562                   Subjective: Pt reports that she received dx from her primary dr as arthritis under her patella on L side  Pt noted she had a cortisone shot on Friday that wore off by   Objective: See treatment diary below      Precautions: none    Daily Treatment Diary     Manual  12/5 12/10 12/17 12/24 12/31 1/2 1/9 1/14   IASTM medial L knee KP CW KP KP KP np  np D/C   Patellar ROM KP CW KP KP KP CW KP KP                                        Exercise Diary  12/5 12/10 12/17 12/24 12/31 1/2 1/9 1/14   bike 10' 10' 10' 10' 10' 10' 10' 10'   gastroc self-stretch 3x30" 3x30" 3x30" 3x30" 3x30" 3x30" 3x30" 3x30"   Hamstring self stretch 3x30" 3x30" 3x30" 3x30" 3x30" 3x30"  3x30" 3x30"   SLR, SLR w/ ER 2x15 ea 2# 2x10 ea 2x10 slr, no weight 2x15 slr, no weight 2x15 slr, no weight 2x15 slr  2x15 slr 2x15 slr   LAQ 2x15 2# 2x20    2x20 2x20  2x15 2# 2x15 2#   Mini squats 2x15 2x15 2x15 2x15 2x15 2x15 2x15 2x15   Standing TKE w/ TB 2x20 btb 2x20 BTB 2x20 BTB 2x20 BTB 2x20 BTB 2x20 BTB  2x20 BTB 2x20 BTB   clamshells 2x15 ea otb np np np np np     Reverse clamshells 15x otb np np np np np     Step ups (fwd, lat) 20 fsu/lsu 6" L 20 fsu/lsu 6"  20 fsu/lsu 6" 20 fsu/lsu 6" 20 fsu/lsu 6" 20x ea 6"  20x ea 6" 20x ea 6"   Hip 3-way storks      15x ea OTB 15x ea OTB L 20x ea OTB L   Lunges into bosu ball       20x 20x 20x                                                                                    Modalities              CP PRN                                            Assessment: Tolerated treatment well  Patient would benefit from continued PT  Pt will be contacting [facility] regarding continued ins coverage, and let us know outcome  Pt did well this session with exercises    Pt  able to complete all exercises with no increase in pain during or after session  Pt  1:1 with PTA for entirety  Plan: Continue per plan of care

## 2019-01-16 ENCOUNTER — APPOINTMENT (OUTPATIENT)
Dept: PHYSICAL THERAPY | Facility: REHABILITATION | Age: 54
End: 2019-01-16
Payer: OTHER MISCELLANEOUS

## 2019-02-11 ENCOUNTER — HOSPITAL ENCOUNTER (EMERGENCY)
Facility: HOSPITAL | Age: 54
Discharge: HOME | End: 2019-02-11
Attending: EMERGENCY MEDICINE | Admitting: EMERGENCY MEDICINE
Payer: COMMERCIAL

## 2019-02-11 VITALS
SYSTOLIC BLOOD PRESSURE: 145 MMHG | TEMPERATURE: 98 F | WEIGHT: 135.36 LBS | BODY MASS INDEX: 21.25 KG/M2 | RESPIRATION RATE: 16 BRPM | DIASTOLIC BLOOD PRESSURE: 78 MMHG | HEART RATE: 62 BPM | HEIGHT: 67 IN | OXYGEN SATURATION: 97 %

## 2019-02-11 DIAGNOSIS — R00.2 INTERMITTENT PALPITATIONS: ICD-10-CM

## 2019-02-11 DIAGNOSIS — R00.2 HEART PALPITATIONS: ICD-10-CM

## 2019-02-11 DIAGNOSIS — F17.210 CIGARETTE NICOTINE DEPENDENCE WITHOUT COMPLICATION: ICD-10-CM

## 2019-02-11 DIAGNOSIS — I49.1 APC (ATRIAL PREMATURE CONTRACTIONS): Primary | ICD-10-CM

## 2019-02-11 LAB
ANION GAP SERPL CALCULATED.3IONS-SCNC: 11 MMOL/L (ref 4–13)
BASOPHILS # BLD AUTO: 0.03 THOUSANDS/ΜL (ref 0–0.1)
BASOPHILS NFR BLD AUTO: 0 % (ref 0–1)
BUN SERPL-MCNC: 15 MG/DL (ref 5–25)
CALCIUM SERPL-MCNC: 8.8 MG/DL (ref 8.3–10.1)
CHLORIDE SERPL-SCNC: 106 MMOL/L (ref 100–108)
CO2 SERPL-SCNC: 28 MMOL/L (ref 21–32)
CREAT SERPL-MCNC: 0.46 MG/DL (ref 0.6–1.3)
EOSINOPHIL # BLD AUTO: 0.1 THOUSAND/ΜL (ref 0–0.61)
EOSINOPHIL NFR BLD AUTO: 1 % (ref 0–6)
ERYTHROCYTE [DISTWIDTH] IN BLOOD BY AUTOMATED COUNT: 12.8 % (ref 11.6–15.1)
GFR SERPL CREATININE-BSD FRML MDRD: 112 ML/MIN/1.73SQ M
GLUCOSE SERPL-MCNC: 89 MG/DL (ref 65–140)
HCT VFR BLD AUTO: 39.9 % (ref 34.8–46.1)
HGB BLD-MCNC: 13.5 G/DL (ref 11.5–15.4)
IMM GRANULOCYTES # BLD AUTO: 0.01 THOUSAND/UL (ref 0–0.2)
IMM GRANULOCYTES NFR BLD AUTO: 0 % (ref 0–2)
LYMPHOCYTES # BLD AUTO: 2.14 THOUSANDS/ΜL (ref 0.6–4.47)
LYMPHOCYTES NFR BLD AUTO: 27 % (ref 14–44)
MCH RBC QN AUTO: 30.3 PG (ref 26.8–34.3)
MCHC RBC AUTO-ENTMCNC: 33.8 G/DL (ref 31.4–37.4)
MCV RBC AUTO: 90 FL (ref 82–98)
MONOCYTES # BLD AUTO: 0.45 THOUSAND/ΜL (ref 0.17–1.22)
MONOCYTES NFR BLD AUTO: 6 % (ref 4–12)
NEUTROPHILS # BLD AUTO: 5.11 THOUSANDS/ΜL (ref 1.85–7.62)
NEUTS SEG NFR BLD AUTO: 66 % (ref 43–75)
NRBC BLD AUTO-RTO: 0 /100 WBCS
PLATELET # BLD AUTO: 283 THOUSANDS/UL (ref 149–390)
PMV BLD AUTO: 9.7 FL (ref 8.9–12.7)
POTASSIUM SERPL-SCNC: 3.5 MMOL/L (ref 3.5–5.3)
RBC # BLD AUTO: 4.45 MILLION/UL (ref 3.81–5.12)
SODIUM SERPL-SCNC: 145 MMOL/L (ref 136–145)
TSH SERPL DL<=0.05 MIU/L-ACNC: 1.2 UIU/ML (ref 0.36–3.74)
WBC # BLD AUTO: 7.84 THOUSAND/UL (ref 4.31–10.16)

## 2019-02-11 PROCEDURE — 85025 COMPLETE CBC W/AUTO DIFF WBC: CPT | Performed by: EMERGENCY MEDICINE

## 2019-02-11 PROCEDURE — 36415 COLL VENOUS BLD VENIPUNCTURE: CPT | Performed by: EMERGENCY MEDICINE

## 2019-02-11 PROCEDURE — 99285 EMERGENCY DEPT VISIT HI MDM: CPT

## 2019-02-11 PROCEDURE — 84443 ASSAY THYROID STIM HORMONE: CPT | Performed by: EMERGENCY MEDICINE

## 2019-02-11 PROCEDURE — 93005 ELECTROCARDIOGRAM TRACING: CPT

## 2019-02-11 PROCEDURE — 80048 BASIC METABOLIC PNL TOTAL CA: CPT | Performed by: EMERGENCY MEDICINE

## 2019-02-11 NOTE — ED PROVIDER NOTES
History  Chief Complaint   Patient presents with    Palpitations     palpitations beginning yesterday  feels like nonstop flipflopping  admits to some lightheadedness       History provided by:  Patient and spouse  Palpitations   Palpitations quality:  Irregular  Onset quality:  Sudden  Duration:  1 second (Sensation of palpitations lasts about a 2nd and fully resolved, she has been feeling it on and off for the past 2 days)  Timing:  Constant  Progression:  Unchanged  Chronicity:  New  Context: anxiety and caffeine    Relieved by:  None tried  Worsened by:  Nothing  Ineffective treatments:  None tried  Associated symptoms: no chest pain, no chest pressure, no cough, no diaphoresis, no dizziness, no lower extremity edema, no malaise/fatigue, no nausea, no numbness, no PND, no shortness of breath and no vomiting        Prior to Admission Medications   Prescriptions Last Dose Informant Patient Reported? Taking? Multiple Vitamin (MULTI-VITAMIN DAILY PO)   Yes No   Sig: Take 1 tablet by mouth daily   diclofenac sodium (VOLTAREN) 1 %   Yes No   Sig: Apply 2 g topically 4 (four) times a day   ibuprofen (MOTRIN) 600 mg tablet   No No   Sig: Take 1 tablet by mouth every 6 (six) hours as needed for mild pain or moderate pain for up to 30 days      Facility-Administered Medications: None       Past Medical History:   Diagnosis Date    Nerve pain        Past Surgical History:   Procedure Laterality Date    CHOLECYSTECTOMY      IA WRIST Otto Greening LIG Left 5/19/2017    Procedure: ENDOSCOPIC CARPAL TUNNEL RELEASE ;  Surgeon: Krista Liang MD;  Location: [facility];  Service: Orthopedics    TONSILLECTOMY         Family History   Problem Relation Age of Onset    Cancer Father      I have reviewed and agree with the history as documented      Social History     Tobacco Use    Smoking status: Current Every Day Smoker     Packs/day: 1 00    Smokeless tobacco: Never Used   Substance Use Topics    Alcohol use: Yes     Alcohol/week: 4 2 oz     Types: 7 Glasses of wine per week    Drug use: No        Review of Systems   Constitutional: Negative for activity change, chills, diaphoresis, fever and malaise/fatigue  HENT: Negative for congestion, sinus pressure and sore throat  Eyes: Negative for pain and visual disturbance  Respiratory: Negative for cough, chest tightness, shortness of breath, wheezing and stridor  Cardiovascular: Negative for chest pain, palpitations and PND  Gastrointestinal: Negative for abdominal distention, abdominal pain, constipation, diarrhea, nausea and vomiting  Genitourinary: Negative for dysuria and frequency  Musculoskeletal: Negative for neck pain and neck stiffness  Skin: Negative for rash  Neurological: Negative for dizziness, speech difficulty, light-headedness, numbness and headaches  Physical Exam  Physical Exam   Constitutional: She is oriented to person, place, and time  She appears well-developed  No distress  HENT:   Head: Normocephalic and atraumatic  Eyes: Pupils are equal, round, and reactive to light  Neck: Normal range of motion  Neck supple  No tracheal deviation present  Cardiovascular: Normal rate, regular rhythm, normal heart sounds and intact distal pulses  No murmur heard  Patient visualized having APCs on the monitor  About 1 every 50 or so beats  When she had an APC on the monitor and fully reproduces the symptoms she was describing at home  Pulmonary/Chest: Effort normal and breath sounds normal  No stridor  No respiratory distress  Abdominal: Soft  She exhibits no distension  There is no tenderness  There is no rebound and no guarding  Musculoskeletal: Normal range of motion  Neurological: She is alert and oriented to person, place, and time  Skin: Skin is warm and dry  She is not diaphoretic  No erythema  No pallor  Psychiatric: She has a normal mood and affect  Vitals reviewed        Vital Signs  ED Triage Vitals [02/11/19 0915]   Temperature Pulse Respirations Blood Pressure SpO2   98 °F (36 7 °C) 82 16 168/81 98 %      Temp Source Heart Rate Source Patient Position - Orthostatic VS BP Location FiO2 (%)   Oral Monitor Lying Right arm --      Pain Score       3           Vitals:    02/11/19 0915 02/11/19 1015   BP: 168/81 145/78   Pulse: 82 62   Patient Position - Orthostatic VS: Lying Sitting       Visual Acuity      ED Medications  Medications - No data to display    Diagnostic Studies  Results Reviewed     Procedure Component Value Units Date/Time    Basic metabolic panel [[id number]]  (Abnormal) Collected:  02/11/19 0958    Lab Status:  Final result Specimen:  Blood from Arm, Left Updated:  02/11/19 1034     Sodium 145 mmol/L      Potassium 3 5 mmol/L      Chloride 106 mmol/L      CO2 28 mmol/L      ANION GAP 11 mmol/L      BUN 15 mg/dL      Creatinine 0 46 mg/dL      Glucose 89 mg/dL      Calcium 8 8 mg/dL      eGFR 112 ml/min/1 73sq m     Narrative:       National Kidney Disease Education Program recommendations are as follows:  GFR calculation is accurate only with a steady state creatinine  Chronic Kidney disease less than 60 ml/min/1 73 sq  meters  Kidney failure less than 15 ml/min/1 73 sq  meters  TSH [[id number]]  (Normal) Collected:  02/11/19 0958    Lab Status:  Final result Specimen:  Blood from Arm, Left Updated:  02/11/19 1034     TSH 3RD GENERATON 1 200 uIU/mL     Narrative:       Patients undergoing fluorescein dye angiography may retain small amounts of fluorescein in the body for 48-72 hours post procedure  Samples containing fluorescein can produce falsely depressed TSH values  If the patient had this procedure,a specimen should be resubmitted post fluorescein clearance      CBC and differential [[id number]] Collected:  02/11/19 0959    Lab Status:  Final result Specimen:  Blood from Arm, Left Updated:  02/11/19 1005     WBC 7 84 Thousand/uL      RBC 4 45 Million/uL      Hemoglobin 13 5 g/dL Hematocrit 39 9 %      MCV 90 fL      MCH 30 3 pg      MCHC 33 8 g/dL      RDW 12 8 %      MPV 9 7 fL      Platelets 570 Thousands/uL      nRBC 0 /100 WBCs      Neutrophils Relative 66 %      Immat GRANS % 0 %      Lymphocytes Relative 27 %      Monocytes Relative 6 %      Eosinophils Relative 1 %      Basophils Relative 0 %      Neutrophils Absolute 5 11 Thousands/µL      Immature Grans Absolute 0 01 Thousand/uL      Lymphocytes Absolute 2 14 Thousands/µL      Monocytes Absolute 0 45 Thousand/µL      Eosinophils Absolute 0 10 Thousand/µL      Basophils Absolute 0 03 Thousands/µL                  No orders to display              Procedures  ECG 12 Lead Documentation  Date/Time: 2/11/2019 9:39 AM  Performed by: Kristen Vanessa DO  Authorized by: Kristen Vanessa DO     ECG reviewed by me, the ED Provider: yes    Patient location:  ED  Previous ECG:     Previous ECG:  Compared to current    Comparison ECG info:  11 23 2017    Similarity:  No change  Interpretation:     Interpretation: normal    Rate:     ECG rate:  70    ECG rate assessment: normal    Rhythm:     Rhythm: sinus rhythm    Ectopy:     Ectopy: none    QRS:     QRS axis:  Normal    QRS intervals:  Normal  Conduction:     Conduction: normal    ST segments:     ST segments:  Normal  T waves:     T waves: normal              Smoking Cessation:    The patient was counseled as to the multiple risks to health from continued use of tobacco products  It was explained that continuing to smoke may lead to multiple short and long term negative health consequences, including but not limited to mouth/esophageal/lung cancer, COPD, and heart disease  The patient verbalizes understanding of these risks, and also understands the options and resources available to help stop smoking  Nicotine replacement therapy, local hotlines, and local resources were discussed as viable options for helping to stop tobacco use   The total time spent counseling the patient regarding tobacco cessation was 5 minutes  Face-to-face smoking cessation counseling was provided to the patient   The patient was awake   I spent  5 minutes counseling the patient on smoking cessation and its associated health benefits   The patient displayed good receptiveness to the counseling session    The following interventions were offered to the patient:  Over-the-counter nicotine patch and/or gum  Outpatient physical and written resources were provided with discharge instructions      Phone Contacts  ED Phone Contact    ED Course                               MDM  Number of Diagnoses or Management Options  APC (atrial premature contractions): new and requires workup  Cigarette nicotine dependence without complication: new and requires workup  Heart palpitations: new and requires workup  Intermittent palpitations: new and requires workup  Diagnosis management comments: Patient visualize is having APCs on the monitor, blood work checked which was unremarkable  , reassurance given  Smoking sensation advice given         Amount and/or Complexity of Data Reviewed  Clinical lab tests: ordered and reviewed  Decide to obtain previous medical records or to obtain history from someone other than the patient: yes  Obtain history from someone other than the patient: yes  Review and summarize past medical records: yes  Independent visualization of images, tracings, or specimens: yes        Disposition  Final diagnoses:   APC (atrial premature contractions)   Intermittent palpitations   Heart palpitations   Cigarette nicotine dependence without complication     Time reflects when diagnosis was documented in both MDM as applicable and the Disposition within this note     Time User Action Codes Description Comment    2/11/2019 10:49 AM Keyla MCDONOUGH Add [I49 1] APC (atrial premature contractions)     2/11/2019 10:49 AM Aurora Chinchilla [R00 2] Intermittent palpitations     2/11/2019 10:49 AM Aurora Chinchilla [R00 2] Heart palpitations     2/11/2019  2:12 PM Linn Marie Add [M18 776] Cigarette nicotine dependence without complication       ED Disposition     ED Disposition Condition Date/Time Comment    Discharge Stable Mon Feb 11, 2019 10:49 AM Angela Man discharge to home/self care  Follow-up Information     Follow up With Specialties Details Why Contact Info    Esther Gomez,  Family Medicine Go to  If symptoms worsen [address]  [address]  [phone]            Discharge Medication List as of 2/11/2019 10:49 AM      CONTINUE these medications which have NOT CHANGED    Details   diclofenac sodium (VOLTAREN) 1 % Apply 2 g topically 4 (four) times a day, Historical Med      ibuprofen (MOTRIN) 600 mg tablet Take 1 tablet by mouth every 6 (six) hours as needed for mild pain or moderate pain for up to 30 days, Starting 5/19/2017, Until Sun 6/18/17, Print      Multiple Vitamin (MULTI-VITAMIN DAILY PO) Take 1 tablet by mouth daily, Starting Wed 9/21/2016, Historical Med           No discharge procedures on file      ED Provider  Electronically Signed by           Lamont Morales DO  02/11/19 7102

## 2019-02-12 LAB
ATRIAL RATE: 72 BPM
P AXIS: 79 DEGREES
PR INTERVAL: 124 MS
QRS AXIS: 71 DEGREES
QRSD INTERVAL: 90 MS
QT INTERVAL: 386 MS
QTC INTERVAL: 417 MS
T WAVE AXIS: 74 DEGREES
VENTRICULAR RATE: 70 BPM

## 2019-02-12 PROCEDURE — 93010 ELECTROCARDIOGRAM REPORT: CPT | Performed by: INTERNAL MEDICINE

## 2019-06-27 ENCOUNTER — OFFICE VISIT (OUTPATIENT)
Dept: URGENT CARE | Age: 54
End: 2019-06-27
Payer: COMMERCIAL

## 2019-06-27 ENCOUNTER — APPOINTMENT (OUTPATIENT)
Dept: RADIOLOGY | Age: 54
End: 2019-06-27
Payer: COMMERCIAL

## 2019-06-27 ENCOUNTER — TRANSCRIBE ORDERS (OUTPATIENT)
Dept: URGENT CARE | Age: 54
End: 2019-06-27

## 2019-06-27 VITALS
OXYGEN SATURATION: 89 % | HEIGHT: 67 IN | HEART RATE: 115 BPM | WEIGHT: 134 LBS | BODY MASS INDEX: 21.03 KG/M2 | DIASTOLIC BLOOD PRESSURE: 77 MMHG | RESPIRATION RATE: 28 BRPM | TEMPERATURE: 99.7 F | SYSTOLIC BLOOD PRESSURE: 137 MMHG

## 2019-06-27 DIAGNOSIS — J06.9 BACTERIAL UPPER RESPIRATORY INFECTION: ICD-10-CM

## 2019-06-27 DIAGNOSIS — B96.89 BACTERIAL UPPER RESPIRATORY INFECTION: ICD-10-CM

## 2019-06-27 DIAGNOSIS — R06.2 WHEEZING: Primary | ICD-10-CM

## 2019-06-27 PROCEDURE — G0382 LEV 3 HOSP TYPE B ED VISIT: HCPCS | Performed by: FAMILY MEDICINE

## 2019-06-27 PROCEDURE — 71046 X-RAY EXAM CHEST 2 VIEWS: CPT

## 2019-06-27 PROCEDURE — S9083 URGENT CARE CENTER GLOBAL: HCPCS | Performed by: FAMILY MEDICINE

## 2019-06-27 PROCEDURE — 94640 AIRWAY INHALATION TREATMENT: CPT | Performed by: FAMILY MEDICINE

## 2019-06-27 RX ORDER — AZITHROMYCIN 250 MG/1
TABLET, FILM COATED ORAL
Qty: 6 TABLET | Refills: 0 | Status: SHIPPED | OUTPATIENT
Start: 2019-06-27 | End: 2019-07-01

## 2019-06-27 RX ORDER — ALBUTEROL SULFATE 2.5 MG/3ML
2.5 SOLUTION RESPIRATORY (INHALATION) ONCE
Status: COMPLETED | OUTPATIENT
Start: 2019-06-27 | End: 2019-06-27

## 2019-06-27 RX ADMIN — Medication 0.5 MG: at 18:31

## 2019-06-27 RX ADMIN — ALBUTEROL SULFATE 2.5 MG: 2.5 SOLUTION RESPIRATORY (INHALATION) at 18:31

## 2019-09-24 ENCOUNTER — TELEPHONE (OUTPATIENT)
Dept: FAMILY MEDICINE CLINIC | Facility: CLINIC | Age: 54
End: 2019-09-24

## 2019-09-24 NOTE — TELEPHONE ENCOUNTER
Removed Patient from Dr Rosa Conner list and sent a note to [facility] to removed her from the insurance list

## 2020-09-18 ENCOUNTER — HOSPITAL ENCOUNTER (EMERGENCY)
Facility: HOSPITAL | Age: 55
Discharge: HOME | End: 2020-09-18
Attending: EMERGENCY MEDICINE
Payer: COMMERCIAL

## 2020-09-18 ENCOUNTER — APPOINTMENT (EMERGENCY)
Dept: ULTRASOUND IMAGING | Facility: HOSPITAL | Age: 55
End: 2020-09-18
Payer: COMMERCIAL

## 2020-09-18 VITALS
OXYGEN SATURATION: 95 % | SYSTOLIC BLOOD PRESSURE: 203 MMHG | RESPIRATION RATE: 16 BRPM | DIASTOLIC BLOOD PRESSURE: 84 MMHG | HEART RATE: 82 BPM | TEMPERATURE: 98.2 F

## 2020-09-18 DIAGNOSIS — I80.9 SUPERFICIAL THROMBOPHLEBITIS: Primary | ICD-10-CM

## 2020-09-18 PROCEDURE — 99284 EMERGENCY DEPT VISIT MOD MDM: CPT

## 2020-09-18 PROCEDURE — 93971 EXTREMITY STUDY: CPT

## 2020-09-18 PROCEDURE — 99284 EMERGENCY DEPT VISIT MOD MDM: CPT | Performed by: EMERGENCY MEDICINE

## 2020-09-18 NOTE — ED PROVIDER NOTES
History  Chief Complaint   Patient presents with    Leg Pain     Pt presents to the ED with c/o right leg pain that started yesterday  No trauma to leg, hx blood clot in same leg      50-year-old female comes in for leg pain and swelling  Patient has a history of varicose veins  She states a small area of 1 of her varicose veins was red hot and swollen  Patient is concerned because she has had a history of superficial thrombophlebitis for 4  Patient states she was reading on the Internet that blood clots could travel to your heart your lungs in her brain from your leg and became frightened  Patient does complain of a slight headache  Patient denies any nausea vomiting fever chills chest pain shortness of breath  Patient has no known history of deep vein thrombosis or PE      History provided by:  Patient   used: No    Leg Pain   Location:  Leg  Time since incident:  1 day  Injury: no    Leg location:  R lower leg  Pain details:     Quality:  Pressure and shooting    Radiates to:  Does not radiate    Severity:  Moderate    Onset quality:  Sudden    Timing:  Constant    Progression:  Worsening  Chronicity:  New  Dislocation: no    Foreign body present:  No foreign bodies  Prior injury to area:  No  Ineffective treatments:  NSAIDs  Associated symptoms: swelling    Associated symptoms: no back pain, no decreased ROM, no fatigue, no fever and no tingling    Risk factors: no concern for non-accidental trauma and no recent illness        Prior to Admission Medications   Prescriptions Last Dose Informant Patient Reported? Taking?    Multiple Vitamin (MULTI-VITAMIN DAILY PO)   Yes Yes   Sig: Take 1 tablet by mouth daily   diclofenac sodium (VOLTAREN) 1 % Not Taking at Unknown time  Yes No   Sig: Apply 2 g topically 4 (four) times a day   ibuprofen (MOTRIN) 600 mg tablet   No Yes   Sig: Take 1 tablet by mouth every 6 (six) hours as needed for mild pain or moderate pain for up to 30 days Facility-Administered Medications: None       Past Medical History:   Diagnosis Date    Nerve pain        Past Surgical History:   Procedure Laterality Date    CHOLECYSTECTOMY      [place] WRIST Charmel Malia LIG Left 5/19/2017    Procedure: ENDOSCOPIC CARPAL TUNNEL RELEASE ;  Surgeon: Corine Arango MD;  [facility];  Service: Orthopedics    TONSILLECTOMY         Family History   Problem Relation Age of Onset    Cancer Father      I have reviewed and agree with the history as documented  E-Cigarette/Vaping     E-Cigarette/Vaping Substances     Social History     Tobacco Use    Smoking status: Current Every Day Smoker     Packs/day: 1 00    Smokeless tobacco: Never Used   Substance Use Topics    Alcohol use: Yes     Alcohol/week: 7 0 standard drinks     Types: 7 Glasses of wine per week    Drug use: No       Review of Systems   Constitutional: Negative for fatigue and fever  HENT: Negative for congestion and ear pain  Eyes: Negative for discharge and redness  Respiratory: Negative for apnea, cough, shortness of breath and wheezing  Cardiovascular: Negative for chest pain  Gastrointestinal: Negative for abdominal pain and diarrhea  Endocrine: Negative for cold intolerance and polydipsia  Genitourinary: Negative for difficulty urinating and hematuria  Musculoskeletal: Negative for arthralgias and back pain  Skin: Negative for color change and rash  Allergic/Immunologic: Negative for environmental allergies and immunocompromised state  Neurological: Negative for numbness and headaches  Hematological: Negative for adenopathy  Does not bruise/bleed easily  Psychiatric/Behavioral: Negative for agitation and behavioral problems  Physical Exam  Physical Exam  Vitals signs and nursing note reviewed  Constitutional:       Appearance: Normal appearance  She is well-developed  She is not toxic-appearing  HENT:      Head: Normocephalic and atraumatic        Right Ear: Tympanic membrane and external ear normal       Left Ear: Tympanic membrane and external ear normal       Nose: Nose normal  No nasal deformity or rhinorrhea  Mouth/Throat:      Dentition: Normal dentition  Pharynx: Uvula midline  Eyes:      General: Lids are normal          Right eye: No discharge  Left eye: No discharge  Conjunctiva/sclera: Conjunctivae normal       Pupils: Pupils are equal, round, and reactive to light  Neck:      Musculoskeletal: Normal range of motion and neck supple  Vascular: No carotid bruit or JVD  Trachea: Trachea normal    Cardiovascular:      Rate and Rhythm: Normal rate and regular rhythm  No extrasystoles are present  Chest Wall: PMI is not displaced  Pulses: Normal pulses  Pulmonary:      Effort: Pulmonary effort is normal  No accessory muscle usage or respiratory distress  Breath sounds: Normal breath sounds  No wheezing, rhonchi or rales  Abdominal:      General: Bowel sounds are normal       Palpations: Abdomen is soft  Abdomen is not rigid  There is no mass  Tenderness: There is no abdominal tenderness  There is no guarding or rebound  Musculoskeletal:      Right shoulder: She exhibits normal range of motion, no bony tenderness, no swelling and no deformity  Cervical back: Normal  She exhibits normal range of motion, no tenderness, no bony tenderness and no deformity  Lymphadenopathy:      Cervical: No cervical adenopathy  Skin:     General: Skin is warm and dry  Findings: No rash  Neurological:      Mental Status: She is alert and oriented to person, place, and time  GCS: GCS eye subscore is 4  GCS verbal subscore is 5  GCS motor subscore is 6  Cranial Nerves: No cranial nerve deficit  Sensory: No sensory deficit  Deep Tendon Reflexes: Reflexes are normal and symmetric  Psychiatric:         Mood and Affect: Mood is anxious           Speech: Speech normal  Behavior: Behavior normal          Vital Signs  ED Triage Vitals   Temperature Pulse Respirations Blood Pressure SpO2   09/18/20 1915 09/18/20 1915 09/18/20 1915 09/18/20 1917 09/18/20 1915   98 2 °F (36 8 °C) 82 16 (!) 203/84 95 %      Temp Source Heart Rate Source Patient Position - Orthostatic VS BP Location FiO2 (%)   09/18/20 1915 09/18/20 1915 -- 09/18/20 1915 --   Oral Monitor  Left arm       Pain Score       09/18/20 1915       7           Vitals:    09/18/20 1915 09/18/20 1917   BP:  (!) 203/84   Pulse: 82          Visual Acuity      ED Medications  Medications - No data to display    Diagnostic Studies  Results Reviewed     None                 VAS lower limb venous duplex study, unilateral/limited    (Results Pending)              Procedures  Procedures         ED Course                                       MDM  Number of Diagnoses or Management Options  Superficial thrombophlebitis: new and requires workup     Amount and/or Complexity of Data Reviewed  Tests in the radiology section of CPT®: ordered and reviewed    Patient Progress  Patient progress: stable      Disposition  Final diagnoses:   Superficial thrombophlebitis     Time reflects when diagnosis was documented in both MDM as applicable and the Disposition within this note     Time User Action Codes Description Comment    9/18/2020  8:38 PM Flex Strickland Add [I80 9] Superficial thrombophlebitis       ED Disposition     ED Disposition Condition Date/Time Comment    Discharge Stable Fri Sep 18, 2020  8:37 PM Reese Soares discharge to home/self care  Follow-up Information     Follow up With Specialties Details Why Contact Info    Marissa Jaramillo DO Family Medicine Schedule an appointment as soon as possible for a visit  Recheck blood pressure [address]  [address]  [phone]            Patient's Medications   Discharge Prescriptions    No medications on file     No discharge procedures on file      PDMP Review     None          ED Provider  Electronically Signed by           Don De Oliveira DO  09/18/20 2043

## 2020-09-19 PROCEDURE — 93971 EXTREMITY STUDY: CPT | Performed by: SURGERY

## 2020-10-05 ENCOUNTER — TRANSCRIBE ORDERS (OUTPATIENT)
Dept: VASCULAR SURGERY | Facility: CLINIC | Age: 55
End: 2020-10-05

## 2020-10-05 DIAGNOSIS — M17.12 PATELLOFEMORAL ARTHRITIS OF LEFT KNEE: Primary | ICD-10-CM

## 2020-10-06 ENCOUNTER — OFFICE VISIT (OUTPATIENT)
Dept: VASCULAR SURGERY | Facility: CLINIC | Age: 55
End: 2020-10-06
Payer: COMMERCIAL

## 2020-10-06 VITALS
WEIGHT: 140 LBS | SYSTOLIC BLOOD PRESSURE: 140 MMHG | BODY MASS INDEX: 21.97 KG/M2 | TEMPERATURE: 99.4 F | HEIGHT: 67 IN | DIASTOLIC BLOOD PRESSURE: 84 MMHG | HEART RATE: 94 BPM

## 2020-10-06 DIAGNOSIS — M17.12 PATELLOFEMORAL ARTHRITIS OF LEFT KNEE: ICD-10-CM

## 2020-10-06 DIAGNOSIS — I80.01 THROMBOPHLEBITIS OF SAPHENOUS VEIN, RIGHT: Primary | ICD-10-CM

## 2020-10-06 PROCEDURE — 99204 OFFICE O/P NEW MOD 45 MIN: CPT | Performed by: SURGERY

## 2020-10-13 ENCOUNTER — HOSPITAL ENCOUNTER (OUTPATIENT)
Dept: ULTRASOUND IMAGING | Facility: HOSPITAL | Age: 55
Discharge: HOME | End: 2020-10-13
Attending: SURGERY
Payer: COMMERCIAL

## 2020-10-13 DIAGNOSIS — I80.01 THROMBOPHLEBITIS OF SAPHENOUS VEIN, RIGHT: ICD-10-CM

## 2020-10-13 PROCEDURE — 93971 EXTREMITY STUDY: CPT | Performed by: SURGERY

## 2020-10-13 PROCEDURE — 93971 EXTREMITY STUDY: CPT

## 2020-10-29 ENCOUNTER — OFFICE VISIT (OUTPATIENT)
Dept: VASCULAR SURGERY | Facility: CLINIC | Age: 55
End: 2020-10-29
Payer: COMMERCIAL

## 2020-10-29 VITALS
RESPIRATION RATE: 18 BRPM | TEMPERATURE: 98.8 F | HEIGHT: 67 IN | WEIGHT: 142 LBS | DIASTOLIC BLOOD PRESSURE: 72 MMHG | SYSTOLIC BLOOD PRESSURE: 126 MMHG | BODY MASS INDEX: 22.29 KG/M2 | HEART RATE: 75 BPM

## 2020-10-29 DIAGNOSIS — I83.11 VARICOSE VEINS OF BOTH LOWER EXTREMITIES WITH INFLAMMATION: Primary | ICD-10-CM

## 2020-10-29 DIAGNOSIS — I83.12 VARICOSE VEINS OF BOTH LOWER EXTREMITIES WITH INFLAMMATION: Primary | ICD-10-CM

## 2020-10-29 PROCEDURE — 99214 OFFICE O/P EST MOD 30 MIN: CPT | Performed by: SURGERY

## 2020-11-07 PROBLEM — F17.200 SMOKER: Status: ACTIVE | Noted: 2020-11-07

## 2020-11-13 ENCOUNTER — OFFICE VISIT (OUTPATIENT)
Dept: FAMILY MEDICINE CLINIC | Facility: CLINIC | Age: 55
End: 2020-11-13
Payer: COMMERCIAL

## 2020-11-13 VITALS
HEIGHT: 68 IN | OXYGEN SATURATION: 95 % | DIASTOLIC BLOOD PRESSURE: 74 MMHG | WEIGHT: 139 LBS | HEART RATE: 74 BPM | TEMPERATURE: 98.5 F | SYSTOLIC BLOOD PRESSURE: 150 MMHG | RESPIRATION RATE: 12 BRPM | BODY MASS INDEX: 21.07 KG/M2

## 2020-11-13 DIAGNOSIS — Z23 NEED FOR VACCINATION: ICD-10-CM

## 2020-11-13 DIAGNOSIS — R03.0 ELEVATED BLOOD PRESSURE READING: ICD-10-CM

## 2020-11-13 DIAGNOSIS — Z12.31 SCREENING MAMMOGRAM, ENCOUNTER FOR: ICD-10-CM

## 2020-11-13 DIAGNOSIS — Z00.00 WELL ADULT EXAM: Primary | ICD-10-CM

## 2020-11-13 DIAGNOSIS — Z29.9 PREVENTIVE MEASURE: ICD-10-CM

## 2020-11-13 DIAGNOSIS — R06.02 SOB (SHORTNESS OF BREATH): ICD-10-CM

## 2020-11-13 DIAGNOSIS — F41.8 SITUATIONAL ANXIETY: ICD-10-CM

## 2020-11-13 DIAGNOSIS — L85.3 DRY SKIN: ICD-10-CM

## 2020-11-13 DIAGNOSIS — Z80.0 FAMILY HISTORY OF COLON CANCER REQUIRING SCREENING COLONOSCOPY: ICD-10-CM

## 2020-11-13 DIAGNOSIS — Z72.0 TOBACCO ABUSE: ICD-10-CM

## 2020-11-13 DIAGNOSIS — Z11.59 NEED FOR HEPATITIS C SCREENING TEST: ICD-10-CM

## 2020-11-13 DIAGNOSIS — R93.89 ABNORMAL CXR: ICD-10-CM

## 2020-11-13 DIAGNOSIS — R00.2 PALPITATIONS: ICD-10-CM

## 2020-11-13 DIAGNOSIS — Z11.4 SCREENING FOR HIV (HUMAN IMMUNODEFICIENCY VIRUS): ICD-10-CM

## 2020-11-13 DIAGNOSIS — R06.00 DOE (DYSPNEA ON EXERTION): ICD-10-CM

## 2020-11-13 PROBLEM — Z78.0 MENOPAUSE: Status: ACTIVE | Noted: 2020-11-13

## 2020-11-13 PROCEDURE — 90471 IMMUNIZATION ADMIN: CPT | Performed by: FAMILY MEDICINE

## 2020-11-13 PROCEDURE — 99386 PREV VISIT NEW AGE 40-64: CPT | Performed by: FAMILY MEDICINE

## 2020-11-13 PROCEDURE — 90682 RIV4 VACC RECOMBINANT DNA IM: CPT | Performed by: FAMILY MEDICINE

## 2020-11-13 PROCEDURE — 3008F BODY MASS INDEX DOCD: CPT | Performed by: FAMILY MEDICINE

## 2020-11-13 PROCEDURE — 99204 OFFICE O/P NEW MOD 45 MIN: CPT | Performed by: FAMILY MEDICINE

## 2020-11-13 PROCEDURE — 4004F PT TOBACCO SCREEN RCVD TLK: CPT | Performed by: FAMILY MEDICINE

## 2020-11-13 PROCEDURE — 3725F SCREEN DEPRESSION PERFORMED: CPT | Performed by: FAMILY MEDICINE

## 2020-11-14 PROCEDURE — 93000 ELECTROCARDIOGRAM COMPLETE: CPT | Performed by: FAMILY MEDICINE

## 2020-11-24 ENCOUNTER — LAB (OUTPATIENT)
Dept: LAB | Facility: CLINIC | Age: 55
End: 2020-11-24
Payer: COMMERCIAL

## 2020-11-24 DIAGNOSIS — Z72.0 TOBACCO ABUSE: ICD-10-CM

## 2020-11-24 DIAGNOSIS — R00.2 PALPITATIONS: ICD-10-CM

## 2020-11-24 DIAGNOSIS — L85.3 DRY SKIN: ICD-10-CM

## 2020-11-24 LAB
25(OH)D3 SERPL-MCNC: 10.8 NG/ML (ref 30–100)
ALBUMIN SERPL BCP-MCNC: 3.9 G/DL (ref 3.5–5)
ALP SERPL-CCNC: 65 U/L (ref 46–116)
ALT SERPL W P-5'-P-CCNC: 25 U/L (ref 12–78)
ANION GAP SERPL CALCULATED.3IONS-SCNC: 7 MMOL/L (ref 4–13)
AST SERPL W P-5'-P-CCNC: 12 U/L (ref 5–45)
BASOPHILS # BLD AUTO: 0.05 THOUSANDS/ΜL (ref 0–0.1)
BASOPHILS NFR BLD AUTO: 1 % (ref 0–1)
BILIRUB SERPL-MCNC: 0.33 MG/DL (ref 0.2–1)
BUN SERPL-MCNC: 16 MG/DL (ref 5–25)
CALCIUM SERPL-MCNC: 9 MG/DL (ref 8.3–10.1)
CHLORIDE SERPL-SCNC: 107 MMOL/L (ref 100–108)
CHOLEST SERPL-MCNC: 146 MG/DL (ref 50–200)
CO2 SERPL-SCNC: 27 MMOL/L (ref 21–32)
CREAT SERPL-MCNC: 0.65 MG/DL (ref 0.6–1.3)
EOSINOPHIL # BLD AUTO: 0.24 THOUSAND/ΜL (ref 0–0.61)
EOSINOPHIL NFR BLD AUTO: 3 % (ref 0–6)
ERYTHROCYTE [DISTWIDTH] IN BLOOD BY AUTOMATED COUNT: 13 % (ref 11.6–15.1)
GFR SERPL CREATININE-BSD FRML MDRD: 100 ML/MIN/1.73SQ M
GLUCOSE P FAST SERPL-MCNC: 91 MG/DL (ref 65–99)
HCT VFR BLD AUTO: 44.8 % (ref 34.8–46.1)
HDLC SERPL-MCNC: 75 MG/DL
HGB BLD-MCNC: 14.5 G/DL (ref 11.5–15.4)
IMM GRANULOCYTES # BLD AUTO: 0.03 THOUSAND/UL (ref 0–0.2)
IMM GRANULOCYTES NFR BLD AUTO: 0 % (ref 0–2)
LDLC SERPL CALC-MCNC: 61 MG/DL (ref 0–100)
LYMPHOCYTES # BLD AUTO: 2.1 THOUSANDS/ΜL (ref 0.6–4.47)
LYMPHOCYTES NFR BLD AUTO: 23 % (ref 14–44)
MCH RBC QN AUTO: 29.8 PG (ref 26.8–34.3)
MCHC RBC AUTO-ENTMCNC: 32.4 G/DL (ref 31.4–37.4)
MCV RBC AUTO: 92 FL (ref 82–98)
MONOCYTES # BLD AUTO: 0.51 THOUSAND/ΜL (ref 0.17–1.22)
MONOCYTES NFR BLD AUTO: 6 % (ref 4–12)
NEUTROPHILS # BLD AUTO: 6.35 THOUSANDS/ΜL (ref 1.85–7.62)
NEUTS SEG NFR BLD AUTO: 67 % (ref 43–75)
NONHDLC SERPL-MCNC: 71 MG/DL
NRBC BLD AUTO-RTO: 0 /100 WBCS
PLATELET # BLD AUTO: 312 THOUSANDS/UL (ref 149–390)
PMV BLD AUTO: 9.8 FL (ref 8.9–12.7)
POTASSIUM SERPL-SCNC: 4.9 MMOL/L (ref 3.5–5.3)
PROT SERPL-MCNC: 6.9 G/DL (ref 6.4–8.2)
RBC # BLD AUTO: 4.87 MILLION/UL (ref 3.81–5.12)
SODIUM SERPL-SCNC: 141 MMOL/L (ref 136–145)
TRIGL SERPL-MCNC: 50 MG/DL
TSH SERPL DL<=0.05 MIU/L-ACNC: 1.1 UIU/ML (ref 0.36–3.74)
WBC # BLD AUTO: 9.28 THOUSAND/UL (ref 4.31–10.16)

## 2020-11-24 PROCEDURE — 80061 LIPID PANEL: CPT

## 2020-11-24 PROCEDURE — 84443 ASSAY THYROID STIM HORMONE: CPT

## 2020-11-24 PROCEDURE — 80053 COMPREHEN METABOLIC PANEL: CPT

## 2020-11-24 PROCEDURE — 36415 COLL VENOUS BLD VENIPUNCTURE: CPT

## 2020-11-24 PROCEDURE — 85025 COMPLETE CBC W/AUTO DIFF WBC: CPT

## 2020-11-24 PROCEDURE — 82306 VITAMIN D 25 HYDROXY: CPT

## 2020-11-25 PROBLEM — E55.9 VITAMIN D DEFICIENCY: Status: ACTIVE | Noted: 2020-11-25

## 2020-12-07 ENCOUNTER — HOSPITAL ENCOUNTER (OUTPATIENT)
Dept: PULMONOLOGY | Facility: HOSPITAL | Age: 55
Discharge: HOME | End: 2020-12-07
Attending: FAMILY MEDICINE
Payer: COMMERCIAL

## 2020-12-07 DIAGNOSIS — R06.02 SOB (SHORTNESS OF BREATH): ICD-10-CM

## 2020-12-07 DIAGNOSIS — Z72.0 TOBACCO ABUSE: ICD-10-CM

## 2020-12-07 PROCEDURE — 94729 DIFFUSING CAPACITY: CPT

## 2020-12-07 PROCEDURE — 94729 DIFFUSING CAPACITY: CPT | Performed by: INTERNAL MEDICINE

## 2020-12-07 PROCEDURE — 94010 BREATHING CAPACITY TEST: CPT | Performed by: INTERNAL MEDICINE

## 2020-12-07 PROCEDURE — 94726 PLETHYSMOGRAPHY LUNG VOLUMES: CPT

## 2020-12-07 PROCEDURE — 94760 N-INVAS EAR/PLS OXIMETRY 1: CPT

## 2020-12-07 PROCEDURE — 94010 BREATHING CAPACITY TEST: CPT

## 2020-12-07 PROCEDURE — 94726 PLETHYSMOGRAPHY LUNG VOLUMES: CPT | Performed by: INTERNAL MEDICINE

## 2020-12-10 ENCOUNTER — OFFICE VISIT (OUTPATIENT)
Dept: FAMILY MEDICINE CLINIC | Facility: CLINIC | Age: 55
End: 2020-12-10
Payer: COMMERCIAL

## 2020-12-10 VITALS
HEIGHT: 68 IN | BODY MASS INDEX: 21.1 KG/M2 | HEART RATE: 85 BPM | WEIGHT: 139.2 LBS | DIASTOLIC BLOOD PRESSURE: 70 MMHG | RESPIRATION RATE: 12 BRPM | SYSTOLIC BLOOD PRESSURE: 124 MMHG | TEMPERATURE: 98.8 F | OXYGEN SATURATION: 95 %

## 2020-12-10 DIAGNOSIS — F17.200 SMOKER: Primary | ICD-10-CM

## 2020-12-10 DIAGNOSIS — R03.0 ELEVATED BLOOD PRESSURE READING: ICD-10-CM

## 2020-12-10 DIAGNOSIS — Z12.11 SCREENING FOR COLON CANCER: ICD-10-CM

## 2020-12-10 DIAGNOSIS — E55.9 VITAMIN D DEFICIENCY: ICD-10-CM

## 2020-12-10 DIAGNOSIS — Z23 NEED FOR VACCINATION: ICD-10-CM

## 2020-12-10 PROCEDURE — 4004F PT TOBACCO SCREEN RCVD TLK: CPT | Performed by: FAMILY MEDICINE

## 2020-12-10 PROCEDURE — 90471 IMMUNIZATION ADMIN: CPT | Performed by: FAMILY MEDICINE

## 2020-12-10 PROCEDURE — 99214 OFFICE O/P EST MOD 30 MIN: CPT | Performed by: FAMILY MEDICINE

## 2020-12-10 PROCEDURE — 3008F BODY MASS INDEX DOCD: CPT | Performed by: FAMILY MEDICINE

## 2020-12-10 PROCEDURE — 90732 PPSV23 VACC 2 YRS+ SUBQ/IM: CPT | Performed by: FAMILY MEDICINE

## 2020-12-10 RX ORDER — ERGOCALCIFEROL 1.25 MG/1
50000 CAPSULE ORAL WEEKLY
Qty: 12 CAPSULE | Refills: 0 | Status: SHIPPED | OUTPATIENT
Start: 2020-12-10 | End: 2021-04-16 | Stop reason: SDUPTHER

## 2020-12-10 RX ORDER — BUPROPION HYDROCHLORIDE 150 MG/1
150 TABLET ORAL EVERY MORNING
Qty: 30 TABLET | Refills: 2 | Status: SHIPPED | OUTPATIENT
Start: 2020-12-10 | End: 2021-04-20 | Stop reason: ALTCHOICE

## 2020-12-11 ENCOUNTER — HOSPITAL ENCOUNTER (OUTPATIENT)
Dept: CT IMAGING | Facility: HOSPITAL | Age: 55
Discharge: HOME | End: 2020-12-11
Attending: FAMILY MEDICINE
Payer: COMMERCIAL

## 2020-12-11 ENCOUNTER — TELEPHONE (OUTPATIENT)
Dept: FAMILY MEDICINE CLINIC | Facility: CLINIC | Age: 55
End: 2020-12-11

## 2020-12-11 ENCOUNTER — HOSPITAL ENCOUNTER (OUTPATIENT)
Dept: NON INVASIVE DIAGNOSTICS | Facility: CLINIC | Age: 55
Discharge: HOME | End: 2020-12-11
Payer: COMMERCIAL

## 2020-12-11 DIAGNOSIS — R06.00 DOE (DYSPNEA ON EXERTION): ICD-10-CM

## 2020-12-11 DIAGNOSIS — R06.02 SOB (SHORTNESS OF BREATH): ICD-10-CM

## 2020-12-11 DIAGNOSIS — R00.2 PALPITATIONS: ICD-10-CM

## 2020-12-11 DIAGNOSIS — Z72.0 TOBACCO ABUSE: ICD-10-CM

## 2020-12-11 PROCEDURE — 93226 XTRNL ECG REC<48 HR SCAN A/R: CPT

## 2020-12-11 PROCEDURE — 93225 XTRNL ECG REC<48 HRS REC: CPT

## 2020-12-11 PROCEDURE — G1004 CDSM NDSC: HCPCS

## 2020-12-11 PROCEDURE — 71250 CT THORAX DX C-: CPT

## 2020-12-14 DIAGNOSIS — J43.8 OTHER EMPHYSEMA (HCC): Primary | ICD-10-CM

## 2020-12-14 DIAGNOSIS — J84.10 PULMONARY FIBROSIS (HCC): ICD-10-CM

## 2020-12-14 DIAGNOSIS — R91.1 LUNG NODULE: ICD-10-CM

## 2020-12-15 PROCEDURE — 93227 XTRNL ECG REC<48 HR R&I: CPT | Performed by: INTERNAL MEDICINE

## 2020-12-31 ENCOUNTER — HOSPITAL ENCOUNTER (OUTPATIENT)
Dept: NON INVASIVE DIAGNOSTICS | Facility: HOSPITAL | Age: 55
Discharge: HOME | End: 2020-12-31
Attending: FAMILY MEDICINE
Payer: COMMERCIAL

## 2020-12-31 DIAGNOSIS — R06.02 SOB (SHORTNESS OF BREATH): ICD-10-CM

## 2020-12-31 DIAGNOSIS — R06.00 DOE (DYSPNEA ON EXERTION): ICD-10-CM

## 2020-12-31 DIAGNOSIS — R00.2 PALPITATIONS: ICD-10-CM

## 2020-12-31 PROCEDURE — 93017 CV STRESS TEST TRACING ONLY: CPT

## 2021-01-04 ENCOUNTER — HOSPITAL ENCOUNTER (OUTPATIENT)
Dept: NON INVASIVE DIAGNOSTICS | Facility: CLINIC | Age: 56
Discharge: HOME | End: 2021-01-04
Payer: COMMERCIAL

## 2021-01-04 ENCOUNTER — TELEPHONE (OUTPATIENT)
Dept: FAMILY MEDICINE CLINIC | Facility: CLINIC | Age: 56
End: 2021-01-04

## 2021-01-04 DIAGNOSIS — R94.39 ABNORMAL STRESS TEST: Primary | ICD-10-CM

## 2021-01-04 DIAGNOSIS — R00.2 PALPITATIONS: ICD-10-CM

## 2021-01-04 DIAGNOSIS — R06.00 DOE (DYSPNEA ON EXERTION): ICD-10-CM

## 2021-01-04 DIAGNOSIS — R06.02 SOB (SHORTNESS OF BREATH): ICD-10-CM

## 2021-01-04 PROCEDURE — 93018 CV STRESS TEST I&R ONLY: CPT | Performed by: INTERNAL MEDICINE

## 2021-01-04 PROCEDURE — 93016 CV STRESS TEST SUPVJ ONLY: CPT | Performed by: INTERNAL MEDICINE

## 2021-01-04 PROCEDURE — 93306 TTE W/DOPPLER COMPLETE: CPT

## 2021-01-04 PROCEDURE — 93306 TTE W/DOPPLER COMPLETE: CPT | Performed by: INTERNAL MEDICINE

## 2021-01-04 NOTE — TELEPHONE ENCOUNTER
Patient called, I gave her the information on her appointment with cardiology on 1/12/2021, she understood all the information that I gave her and had no further questions at this time

## 2021-01-04 NOTE — TELEPHONE ENCOUNTER
Please call cardio - her stress test is abnormal  Cardio appointment not until end of month  Pt is symptomatic  Please see if they can move her appointment up

## 2021-02-10 ENCOUNTER — CONSULT (OUTPATIENT)
Dept: CARDIOLOGY CLINIC | Facility: CLINIC | Age: 56
End: 2021-02-10
Payer: COMMERCIAL

## 2021-02-10 VITALS
HEART RATE: 68 BPM | BODY MASS INDEX: 23.9 KG/M2 | SYSTOLIC BLOOD PRESSURE: 160 MMHG | HEIGHT: 67 IN | WEIGHT: 152.3 LBS | DIASTOLIC BLOOD PRESSURE: 80 MMHG

## 2021-02-10 DIAGNOSIS — R94.39 ABNORMAL STRESS TEST: ICD-10-CM

## 2021-02-10 PROCEDURE — 99203 OFFICE O/P NEW LOW 30 MIN: CPT | Performed by: INTERNAL MEDICINE

## 2021-02-10 NOTE — PROGRESS NOTES
Cardiology Follow Up    Caprice Ribeiro  1965  [id number]  [address] [facility]  [address] [facility]  [phone]    1  Abnormal stress test  Ambulatory referral to Cardiology       Discussion: I reassured her that her palpitations do not appear to be serious, based on her Holter monitor  The lightheadedness and ectopy on the treadmill test appear to have been idiosyncratic  She does not have any symptoms suggesting ischemic disease  Her echo was normal  Lipids are excellent  I congratulated her on having discontinued smoking  Her main cardiovascular issue appears to be blood pressure elevation, which seems to be sporadic  It was normal at the time of her recent appointment with her PCP  I asked her to obtain a blood pressure monitor and check her blood pressure for times a week  I will see her in follow-up in 3 months and will review the findings  If her blood pressure is normal at home, therapy does not appear to be warranted  If her palpitations persist or become warranted, we will obtain longer duration monitoring  She is scheduled to see a pulmonologist because of findings on lung imaging  Cardiovascular History:  Ms Jyotsna Plunkett was referred because of palpitations  These have been present on a daily basis for many years  She denies syncope or near-syncope  She does not experience chest discomfort  A Holter monitor in 12/20 disclosed 257 PVCs with 67 PACs and a single 3 beat run of PSVT  She was symptomatic during the monitored period  There was no correlation with rhythm  Echocardiography in 1/21 disclosed an EF of 55  There were no significant abnormalities  Stress testing was performed in 12/20, it was discontinued when she became lightheaded after only 2'16"  SV ectopy was present   She indicated that she does not normally become lightheaded with exertion and this represented a single-time event     Her risk factors for atherosclerotic disease are smoking, which she discontinued in 12/20, and hypertension  She is not diabetic  A lipid profile in 11/21 very favorable, with total cholesterol 146 LDL 61, HDL 75, and triglycerides 50  At the time of her initial visit her blood pressure was elevated at 160/80 both at the start of the visit and prior to leaving  She was asked to monitor her blood pressure at home and to return in 3 months  She was advised to minimize her use of ibuprofen  Patient Active Problem List   Diagnosis    Patellofemoral arthritis of left knee    Thrombophlebitis of saphenous vein, right    Varicose veins of both lower extremities with inflammation    Smoker    Menopause    Vitamin D deficiency     Past Medical History:   Diagnosis Date    Arthritis     RIGHT KNEE     Gestational hypertension     Nerve pain     Thrombophlebitis     RIGHT LEG      Social History     Socioeconomic History    Marital status: Legally      Spouse name: Not on file    Number of children: 2    Years of education: Not on file    Highest education level: Not on file   Occupational History    Not on file   Social Needs    Financial resource strain: Not on file    Food insecurity     Worry: Not on file     Inability: Not on file    Transportation needs     Medical: Not on file     Non-medical: Not on file   Tobacco Use    Smoking status: Current Every Day Smoker     Packs/day: 1 00     Years: 30 00     Pack years: 30 00     Start date: 11/13/1990    Smokeless tobacco: Never Used   Substance and Sexual Activity    Alcohol use:  Yes     Alcohol/week: 7 0 standard drinks     Types: 7 Glasses of wine per week     Frequency: Monthly or less     Drinks per session: 1 or 2    Drug use: No    Sexual activity: Not Currently   Lifestyle    Physical activity     Days per week: Not on file     Minutes per session: Not on file    Stress: Not on file   Relationships    Social connections     Talks on phone: Not on file     Gets together: Not on file     Attends Mandaen service: Not on file     Active member of club or organization: Not on file     Attends meetings of clubs or organizations: Not on file     Relationship status: Not on file    Intimate partner violence     Fear of current or ex partner: Not on file     Emotionally abused: Not on file     Physically abused: Not on file     Forced sexual activity: Not on file   Other Topics Concern    Not on file   Social History Narrative    Not on file      Family History   Problem Relation Age of Onset    Cancer Father [de-identified]        thinks colon cancer    Heart attack Father 76    Heart attack Mother     Thyroid disease Mother     Atrial fibrillation Mother         pacemaker    Lung cancer Sister         (they share a mother only)      Past Surgical History:   Procedure Laterality Date    CHOLECYSTECTOMY      [place] WRIST Krissy Lone LIG Left 5/19/2017    Procedure: ENDOSCOPIC CARPAL TUNNEL RELEASE ;  Surgeon: Rajan Valerio MD;  [facility];  Service: Orthopedics    TONSILLECTOMY         Current Outpatient Medications:     buPROPion (WELLBUTRIN XL) 150 mg 24 hr tablet, Take 1 tablet (150 mg total) by mouth every morning, Disp: 30 tablet, Rfl: 2    diclofenac sodium (VOLTAREN) 1 %, Apply 2 g topically 4 (four) times a day, Disp: , Rfl:     ergocalciferol (VITAMIN D2) 50,000 units, Take 1 capsule (50,000 Units total) by mouth once a week for 12 doses, Disp: 12 capsule, Rfl: 0    ibuprofen (MOTRIN) 600 mg tablet, Take 1 tablet by mouth every 6 (six) hours as needed for mild pain or moderate pain for up to 30 days, Disp: 10 tablet, Rfl: 0    Multiple Vitamin (MULTI-VITAMIN DAILY PO), Take 1 tablet by mouth daily, Disp: , Rfl:   Allergies   Allergen Reactions    Seasonal Ic  [Cholestatin]        Labs:  Lab on 11/24/2020   Component Date Value    Sodium 11/24/2020 141     Potassium 11/24/2020 4 9     Chloride 11/24/2020 107     CO2 11/24/2020 27     ANION GAP 11/24/2020 7     BUN 11/24/2020 16     Creatinine 11/24/2020 0 65     Glucose, Fasting 11/24/2020 91     Calcium 11/24/2020 9 0     AST 11/24/2020 12     ALT 11/24/2020 25     Alkaline Phosphatase 11/24/2020 65     Total Protein 11/24/2020 6 9     Albumin 11/24/2020 3 9     Total Bilirubin 11/24/2020 0 33     eGFR 11/24/2020 100     Cholesterol 11/24/2020 146     Triglycerides 11/24/2020 50     HDL, Direct 11/24/2020 75     LDL Calculated 11/24/2020 61     Non-HDL-Chol (CHOL-HDL) 11/24/2020 71     Vit D, 25-Hydroxy 11/24/2020 10 8*    TSH 3RD GENERATON 11/24/2020 1 105     WBC 11/24/2020 9 28     RBC 11/24/2020 4 87     Hemoglobin 11/24/2020 14 5     Hematocrit 11/24/2020 44 8     MCV 11/24/2020 92     MCH 11/24/2020 29 8     MCHC 11/24/2020 32 4     RDW 11/24/2020 13 0     MPV 11/24/2020 9 8     Platelets 80/65/1504 312     nRBC 11/24/2020 0     Neutrophils Relative 11/24/2020 67     Immat GRANS % 11/24/2020 0     Lymphocytes Relative 11/24/2020 23     Monocytes Relative 11/24/2020 6     Eosinophils Relative 11/24/2020 3     Basophils Relative 11/24/2020 1     Neutrophils Absolute 11/24/2020 6 35     Immature Grans Absolute 11/24/2020 0 03     Lymphocytes Absolute 11/24/2020 2 10     Monocytes Absolute 11/24/2020 0 51     Eosinophils Absolute 11/24/2020 0 24     Basophils Absolute 11/24/2020 0 05      Imaging: No results found  Review of Systems:  Review of Systems   Constitution: Negative  HENT: Negative  Eyes: Negative  Cardiovascular: Negative  Respiratory: Negative  Endocrine: Negative  Hematologic/Lymphatic: Negative  Skin: Negative  Musculoskeletal: Negative  Gastrointestinal: Negative  Genitourinary: Negative  Neurological: Negative  Psychiatric/Behavioral: Negative  Allergic/Immunologic: Negative  All other systems reviewed and are negative        Vitals: 02/10/21 1529   BP: 160/80   Pulse: 68     Weight (last 2 days)     Date/Time   Weight    02/10/21 1529   69 1 (152 3)              Physical Exam:  Physical Exam   Constitutional: She is oriented to person, place, and time  She appears well-developed and well-nourished  No distress  HENT:   Head: Normocephalic and atraumatic  Eyes: Conjunctivae and EOM are normal  No scleral icterus  Neck: Normal range of motion  Neck supple  No JVD present  No tracheal deviation present  Cardiovascular: Normal rate, regular rhythm, normal heart sounds and intact distal pulses  Exam reveals no gallop and no friction rub  No murmur heard  Pulmonary/Chest: Effort normal and breath sounds normal  No stridor  No respiratory distress  She has no wheezes  She has no rales  She exhibits no tenderness  Abdominal: Soft  Bowel sounds are normal  She exhibits no distension  There is no abdominal tenderness  Musculoskeletal: Normal range of motion  General: No tenderness or edema  Neurological: She is alert and oriented to person, place, and time  No cranial nerve deficit  Coordination normal    Skin: Skin is warm and dry  She is not diaphoretic  No erythema  Psychiatric: She has a normal mood and affect  Her behavior is normal  Judgment and thought content normal    Vitals reviewed        Gordon Salazar MD

## 2021-02-16 ENCOUNTER — TELEMEDICINE (OUTPATIENT)
Dept: PULMONOLOGY | Facility: CLINIC | Age: 56
End: 2021-02-16
Payer: COMMERCIAL

## 2021-02-16 VITALS — WEIGHT: 152 LBS | BODY MASS INDEX: 23.86 KG/M2 | HEIGHT: 67 IN

## 2021-02-16 DIAGNOSIS — J43.8 OTHER EMPHYSEMA (HCC): ICD-10-CM

## 2021-02-16 DIAGNOSIS — R91.8 PULMONARY NODULES: ICD-10-CM

## 2021-02-16 DIAGNOSIS — R91.1 LUNG NODULE: ICD-10-CM

## 2021-02-16 DIAGNOSIS — Z20.1 EXPOSURE TO TB: ICD-10-CM

## 2021-02-16 DIAGNOSIS — F17.211 CIGARETTE NICOTINE DEPENDENCE IN REMISSION: ICD-10-CM

## 2021-02-16 DIAGNOSIS — J43.2 CENTRILOBULAR EMPHYSEMA (HCC): ICD-10-CM

## 2021-02-16 DIAGNOSIS — M19.90 ARTHRITIS: ICD-10-CM

## 2021-02-16 DIAGNOSIS — R93.89 ABNORMAL CHEST CT: Primary | ICD-10-CM

## 2021-02-16 DIAGNOSIS — J84.10 PULMONARY FIBROSIS (HCC): ICD-10-CM

## 2021-02-16 DIAGNOSIS — K21.9 GASTROESOPHAGEAL REFLUX DISEASE WITHOUT ESOPHAGITIS: ICD-10-CM

## 2021-02-16 PROBLEM — F17.210 CIGARETTE NICOTINE DEPENDENCE WITHOUT COMPLICATION: Status: ACTIVE | Noted: 2020-11-07

## 2021-02-16 PROCEDURE — 99245 OFF/OP CONSLTJ NEW/EST HI 55: CPT | Performed by: INTERNAL MEDICINE

## 2021-02-16 RX ORDER — UMECLIDINIUM BROMIDE AND VILANTEROL TRIFENATATE 62.5; 25 UG/1; UG/1
1 POWDER RESPIRATORY (INHALATION) DAILY
Qty: 1 INHALER | Refills: 5 | Status: SHIPPED | OUTPATIENT
Start: 2021-02-16 | End: 2021-03-25 | Stop reason: SDUPTHER

## 2021-02-16 RX ORDER — OMEPRAZOLE 20 MG/1
20 CAPSULE, DELAYED RELEASE ORAL DAILY
Qty: 30 CAPSULE | Refills: 3 | Status: SHIPPED | OUTPATIENT
Start: 2021-02-16 | End: 2021-03-25 | Stop reason: SDUPTHER

## 2021-02-16 NOTE — ASSESSMENT & PLAN NOTE
Patient has a longstanding smoking history of 1 pack per day for at least 30 years  She has successfully quit smoking   In December 2020with the assistance of Wellbutrin  I congratulated on her smoking cessation efforts and she is committed to remaining abstinent

## 2021-02-16 NOTE — PROGRESS NOTES
Virtual Regular Visit - Outpatient Pulmonary Consultation      Assessment/Plan:    Problem List Items Addressed This Visit        Digestive    Gastroesophageal reflux disease without esophagitis      Patient has reflux despite trying to modify her diet  This could certainly contribute to pulmonary issues  I will prescribe omeprazole 20 mg daily  If she continues to have symptoms, I instructed her to discuss GI evaluation with her primary care physician  Relevant Medications    omeprazole (PriLOSEC) 20 mg delayed release capsule       Respiratory    Pulmonary fibrosis (HCC)      There is a focal area of fibrosis in the right lower lobe of unclear etiology  The does not have characteristic findings of UIP  The focal nature would be suggestive of a prior insults, possibly recurrent aspiration in the setting of frequent reflux  An additional concern would be underlying rheumatologic condition  For further evaluation, she will undergo lab work to include DAQUAN, Anca, Sjogren's and scleroderma antibodies and rheumatoid factor  Relevant Medications    umeclidinium-vilanterol (Anoro Ellipta) 62 5-25 MCG/INH inhaler    Other Relevant Orders    CT chest without contrast    DAQUAN Screen w/ Reflex to Titer/Pattern    RF Screen w/ Reflex to Titer    Anti-neutrophilic cytoplasmic antibody    Anti-scleroderma antibody    Sjogren's Antibodies    Centrilobular emphysema (HCC)      There findings of emphysema on the CT, despite no significant obstruction on PFTs  She does have mild air trapping and may benefit from bronchodilator therapy  We will proceed with a 1 month trial of Anoro Ellipta  Relevant Medications    umeclidinium-vilanterol (Anoro Ellipta) 62 5-25 MCG/INH inhaler       Other    Cigarette nicotine dependence in remission      Patient has a longstanding smoking history of 1 pack per day for at least 30 years    She has successfully quit smoking   In December 2020with the assistance of Wellbutrin  I congratulated on her smoking cessation efforts and she is committed to remaining abstinent  Abnormal chest CT - Primary       Patient has multiple findings on chest CT, additional details and differential diagnosis as outlined in individual findings  We will plan to repeat the CT in 3 months to evaluate for interval change and help determine next approach for diagnosis and therapy  Relevant Orders    CT chest without contrast    Pulmonary nodules      She has nodular opacities on chest CT that are somewhat ill-defined and of unclear etiology  She has a prior remote history of tuberculosis exposure, having lived with her grandmother who was diagnosed with TB a few years after she was living with her  Therefore, this is a less likely consideration, however she will have QuantiFERON gold testing to determine her degree of risk  Given her prior longstanding smoking history, underlying malignancy would also be a concern  For this reason, she will have repeat CT in 3 months  If any of the nodules show evidence of progression, she would need PET scan followed by tissue sampling  Relevant Orders    CT chest without contrast      Other Visit Diagnoses     Other emphysema ([address] )        Relevant Medications    umeclidinium-vilanterol (Anoro Ellipta) 62 5-25 MCG/INH inhaler    Lung nodule        Exposure to TB        Relevant Orders    Quantiferon TB Gold Plus    Arthritis        Relevant Orders    DAQUAN Screen w/ Reflex to Titer/Pattern    RF Screen w/ Reflex to Titer    Anti-neutrophilic cytoplasmic antibody    Anti-scleroderma antibody    Sjogren's Antibodies          She will return to the office in March after repeat CT    At that visit, plan to perform 6 minutes walk test to ensure she is not having oxygen desaturation    Reason for visit is   Chief Complaint   Patient presents with    Virtual Regular Visit        Encounter provider Sergio Rodgers DO    Provider [facility]  [facility] [place]  [address] [place] [address]      Recent Visits  No visits were found meeting these conditions  Showing recent visits within past 7 days and meeting all other requirements     Today's Visits  Date Type Provider Dept   02/16/21 Telemedicine Zahida Portillo DO [facility]   Showing today's visits and meeting all other requirements     Future Appointments  No visits were found meeting these conditions  Showing future appointments within next 150 days and meeting all other requirements        The patient was identified by name and date of birth  Josy Everett was informed that this is a telemedicine visit and that the visit is being conducted through [facility] and patient was informed that this is a secure, HIPAA-compliant platform  She agrees to proceed     My office door was closed  No one else was in the room  She acknowledged consent and understanding of privacy and security of the video platform  The patient has agreed to participate and understands they can discontinue the visit at any time  Patient is aware this is a billable service  Subjective  Josy Everett is a 61 y o  female   Seen today for evaluation of abnormal chest CT findings and shortness of breath  In October 2020, patient was diagnosed with superficial thrombophlebitis and was treated with Xarelto for total of 30 days  She has repeat ultrasound pending for tomorrow  At that time, she had some issues with shortness of breath which she thought was related to wearing the mask more frequently  She had subsequent follow-up with her primary care physician who noted that the patient had abnormal chest x-ray findings a few years ago, prompting further investigation  Patient denies known history of lung disease with the exception of a bout of bronchitis approximately 4 years ago    At that time, she was treated with an inhaler, but is unsure which 1 it was  She denies cough, sputum production or wheezing  She stays active and is able to walk a few miles per day  She does find that with heavy exertion, stress or anxiety, she becomes more breathless  She recently underwent a stress test but was only able to complete 2 minutes due to palpitations and dyspnea  She had follow-up with Cardiology last week who felt that the palpitations could be related to hypertension and to monitor her blood pressures at home  She denies hemoptysis, fever, chills or night sweats  Patient has a remote history of tuberculosis exposure when living with her grandmother during college  She recalls that her grandmother coughed often but was not acutely ill during that time she lived with her  Her father had COPD and her sister also  in 2016 of an unexplained pulmonary issue  The patient smoked up to 1 pack per day for at least 30 years and quit in 2020 with the assistance of Wellbutrin  She has a dog and 2 cats at home but does not allergic to them  She grew up in the 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  Over the past few years, she has noticed increased joint pains, specifically in her hands and left shoulder  She has what she describes as "sausage fingers" and has a nodule at the base of her right thumb  She had carpal tunnel surgery performed on the left but still has numbness in both hands    She has never been seen by a rheumatologist     Past Medical History:   Diagnosis Date    Arthritis     RIGHT KNEE     Emphysema of lung ([address] )     Gestational hypertension     Nerve pain     Thrombophlebitis     RIGHT LEG        Past Surgical History:   Procedure Laterality Date    CHOLECYSTECTOMY      MT WRIST Samson [place] LIG Left 5/19/2017    Procedure: ENDOSCOPIC CARPAL TUNNEL RELEASE ;  Surgeon: Kane Cope MD;  Location: [facility];  Service: Orthopedics    TONSILLECTOMY         Current Outpatient Medications   Medication Sig Dispense Refill    buPROPion (WELLBUTRIN XL) 150 mg 24 hr tablet Take 1 tablet (150 mg total) by mouth every morning 30 tablet 2    diclofenac sodium (VOLTAREN) 1 % Apply 2 g topically 4 (four) times a day      ergocalciferol (VITAMIN D2) 50,000 units Take 1 capsule (50,000 Units total) by mouth once a week for 12 doses 12 capsule 0    ibuprofen (MOTRIN) 600 mg tablet Take 1 tablet by mouth every 6 (six) hours as needed for mild pain or moderate pain for up to 30 days 10 tablet 0    Multiple Vitamin (MULTI-VITAMIN DAILY PO) Take 1 tablet by mouth daily      omeprazole (PriLOSEC) 20 mg delayed release capsule Take 1 capsule (20 mg total) by mouth daily 30 capsule 3    umeclidinium-vilanterol (Anoro Ellipta) 62 5-25 MCG/INH inhaler Inhale 1 puff daily 1 Inhaler 5     No current facility-administered medications for this visit  Allergies   Allergen Reactions    Seasonal Ic  [Cholestatin]        Review of Systems   Constitutional: Negative for unexpected weight change  HENT: Negative for congestion and postnasal drip  Eyes: Negative for visual disturbance  Respiratory:          As per HPI   Gastrointestinal:          She has heartburn at least 3 times daily despite modification of her diet  Genitourinary: Negative for difficulty urinating  Musculoskeletal: Positive for arthralgias  Neurological: Positive for dizziness ( with exertion)  Hematological: Negative for adenopathy  Psychiatric/Behavioral: Negative  All other systems reviewed and are negative  Video Exam    Vitals:    02/16/21 0842   Weight: 68 9 kg (152 lb)   Height: 5' 7" (1 702 m)       Physical Exam  Vitals signs reviewed  Constitutional:       General: She is not in acute distress  Appearance: She is not ill-appearing  HENT:      Head: Normocephalic and atraumatic  Nose: No congestion  Mouth/Throat:      Mouth: Mucous membranes are moist    Eyes:      General: No scleral icterus  Neck:      Musculoskeletal: Neck supple  Pulmonary:      Effort: Pulmonary effort is normal  No respiratory distress  Abdominal:      Tenderness: There is no abdominal tenderness  Musculoskeletal:         General: Deformity (  Joint deformity of the right thumb) present  No swelling  Skin:     Coloration: Skin is not jaundiced or pale  Neurological:      General: No focal deficit present  Mental Status: She is alert and oriented to person, place, and time  Psychiatric:         Mood and Affect: Mood normal          Behavior: Behavior normal            data reviewed for today's visit:    CT of the chest performed on 12/11/20 shows several bilateral pulmonary nodules 1 measuring 1 cm and smaller  There is benign biapical pleural -parenchymal scarring and moderate emphysema  Pulmonary artery is enlarged  There is mild ground-glass opacity in traction bronchiolectasis in the right lower lobe  ____________________________________________________________  PFTs performed on 12/7/20  FEV1/FVC Ratio: 74 % (96% predicted)  Forced Vital Capacity: 2 72 L    75 % predicted  FEV1: 2 01 L     71 % predicted     Lung volumes by body plethysmography: Total Lung Capacity 84 % predicted Residual volume 107 % predicted  RV/TLC ratio 126%     DLCO corrected for patients hemoglobin level: 35 %    Normal spirometry and lung volumes    Severe diffusion impairment   _____________________________________________________________    Echocardiogram from 1/4/21 shows an EF of 55%, PA pressure 22 mm Hg    I spent 80 minutes with the patient during this visit  This included 20 minutes of chart review, 10 minutes of ,   20 minutes of time spent documenting and 30 minutes directly face-to-face with the patient  I reviewed notes from vascular surgery in October 2020, Family Practice in December 2020 and January 2021 and Cardiology documentation from 2/10/21  I have also reviewed the stress test that she attempted in 12/31/20  Patient did not exhibit desaturation, but only completed 2 minutes of testing  VIRTUAL VISIT [address]gs acknowledges that she has consented to an online visit or consultation  She understands that the online visit is based solely on information provided by her, and that, in the absence of a face-to-face physical evaluation by the physician, the diagnosis she receives is both limited and provisional in terms of accuracy and completeness  This is not intended to replace a full medical face-to-face evaluation by the physician  Leti Carroll understands and accepts these terms

## 2021-02-16 NOTE — ASSESSMENT & PLAN NOTE
Patient has reflux despite trying to modify her diet  This could certainly contribute to pulmonary issues  I will prescribe omeprazole 20 mg daily  If she continues to have symptoms, I instructed her to discuss GI evaluation with her primary care physician

## 2021-02-16 NOTE — ASSESSMENT & PLAN NOTE
There findings of emphysema on the CT, despite no significant obstruction on PFTs  She does have mild air trapping and may benefit from bronchodilator therapy  We will proceed with a 1 month trial of Anoro Ellipta

## 2021-02-16 NOTE — ASSESSMENT & PLAN NOTE
There is a focal area of fibrosis in the right lower lobe of unclear etiology  The does not have characteristic findings of UIP  The focal nature would be suggestive of a prior insults, possibly recurrent aspiration in the setting of frequent reflux  An additional concern would be underlying rheumatologic condition  For further evaluation, she will undergo lab work to include DAQUAN, Anca, Sjogren's and scleroderma antibodies and rheumatoid factor

## 2021-02-16 NOTE — ASSESSMENT & PLAN NOTE
Patient has multiple findings on chest CT, additional details and differential diagnosis as outlined in individual findings  We will plan to repeat the CT in 3 months to evaluate for interval change and help determine next approach for diagnosis and therapy

## 2021-02-16 NOTE — ASSESSMENT & PLAN NOTE
She has nodular opacities on chest CT that are somewhat ill-defined and of unclear etiology  She has a prior remote history of tuberculosis exposure, having lived with her grandmother who was diagnosed with TB a few years after she was living with her  Therefore, this is a less likely consideration, however she will have QuantiFERON gold testing to determine her degree of risk  Given her prior longstanding smoking history, underlying malignancy would also be a concern  For this reason, she will have repeat CT in 3 months  If any of the nodules show evidence of progression, she would need PET scan followed by tissue sampling

## 2021-02-17 ENCOUNTER — HOSPITAL ENCOUNTER (OUTPATIENT)
Dept: NON INVASIVE DIAGNOSTICS | Facility: CLINIC | Age: 56
Discharge: HOME | End: 2021-02-17
Payer: COMMERCIAL

## 2021-02-17 DIAGNOSIS — I83.11 VARICOSE VEINS OF BOTH LOWER EXTREMITIES WITH INFLAMMATION: ICD-10-CM

## 2021-02-17 DIAGNOSIS — I83.12 VARICOSE VEINS OF BOTH LOWER EXTREMITIES WITH INFLAMMATION: ICD-10-CM

## 2021-02-17 PROCEDURE — 93970 EXTREMITY STUDY: CPT

## 2021-02-19 PROCEDURE — 93970 EXTREMITY STUDY: CPT | Performed by: SURGERY

## 2021-02-23 ENCOUNTER — TELEPHONE (OUTPATIENT)
Dept: VASCULAR SURGERY | Facility: CLINIC | Age: 56
End: 2021-02-23

## 2021-02-23 ENCOUNTER — OFFICE VISIT (OUTPATIENT)
Dept: VASCULAR SURGERY | Facility: CLINIC | Age: 56
End: 2021-02-23
Payer: COMMERCIAL

## 2021-02-23 VITALS
WEIGHT: 152 LBS | TEMPERATURE: 97.8 F | HEIGHT: 67 IN | HEART RATE: 96 BPM | BODY MASS INDEX: 23.86 KG/M2 | SYSTOLIC BLOOD PRESSURE: 136 MMHG | DIASTOLIC BLOOD PRESSURE: 88 MMHG

## 2021-02-23 DIAGNOSIS — I83.11 VARICOSE VEINS OF BOTH LOWER EXTREMITIES WITH INFLAMMATION: Primary | ICD-10-CM

## 2021-02-23 DIAGNOSIS — I80.01 THROMBOPHLEBITIS OF SAPHENOUS VEIN, RIGHT: ICD-10-CM

## 2021-02-23 DIAGNOSIS — I83.12 VARICOSE VEINS OF BOTH LOWER EXTREMITIES WITH INFLAMMATION: Primary | ICD-10-CM

## 2021-02-23 PROCEDURE — 99215 OFFICE O/P EST HI 40 MIN: CPT | Performed by: SURGERY

## 2021-02-23 RX ORDER — CHLORHEXIDINE GLUCONATE 0.12 MG/ML
15 RINSE ORAL ONCE
Status: CANCELLED | OUTPATIENT
Start: 2021-02-23 | End: 2021-02-23

## 2021-02-23 RX ORDER — CEFAZOLIN SODIUM 1 G/50ML
1000 SOLUTION INTRAVENOUS ONCE
Status: CANCELLED | OUTPATIENT
Start: 2021-02-23 | End: 2021-02-23

## 2021-02-23 NOTE — TELEPHONE ENCOUNTER
Check out staff:  [employer]: Under Reason For Call, comments MUST be formatted as:   (Surgeon's Initials) / (Procedure)    PHYSICIAN / HOSPITAL: Jero Rodriguez (NPI: [id number]) Kellie Poster (Tax: [id number] / NPI: [id number])    PROCEDURE: Left GSV EVLT + stab phlebectomy     LEVEL: 3    REP: No    ASSISTANT SURGEON: No    ALLERGIES: Seasonal ic  [cholestatin]    INSTRUCTIONS GIVEN: NO BOWEL PREP    BLOOD THINNERS / MED HOLD: Patient is not taking any blood thinners  HYDRATION REQUIRED: Patient does not require hydration  DIALYSIS: Patient is not on dialysis  *Please ROUTE this encounter to [facility] Surgery Coordinator   AND   Send COMPLETE CONSENT to Vascular Surgery Schedulers e-mail group*    I certify that patient has signed, printed, timed, and dated their surgery consent  I certify that BOTH sides of the completed surgery consent have been scanned into the patient's Epic chart by myself on 2/23/2021  *Please ROUTE this encounter to [facility] Clearance Pool   AND   Send CLEARANCE FORM(S) to Vascular Nursing e-mail group*    Patient does not require any pre operative clearance

## 2021-02-23 NOTE — H&P (VIEW-ONLY)
Assessment/Plan:    Varicose veins of both lower extremities with inflammation  57yo Female with chronic venous insufficiency bilaterally experiencing pain, swelling, heaviness aching despite conservative management with with compression stocking, rest elevation  She presenst after 3 months with LEVDR  LEVDR demonstrates bilateral superficial and deep venous insufficiency  HER GSV are large with significant reflux bilaterally  Had an extensive risks/benefits alternatives discussion  Including risk of E-HIT(2%)  and the patient consented to proceed with Left GSV EVLT + stab phlebectomy     I have spent 30 minutes with Patient  today in which greater than 50% of this time was spent in counseling/coordination of care regarding Diagnostic results, Prognosis, Risks and benefits of tx options, Intructions for management, Risk factor reductions and Impressions  Problem List Items Addressed This Visit        Cardiovascular and Mediastinum    Thrombophlebitis of saphenous vein, right    Varicose veins of both lower extremities with inflammation - Primary     59yo Female with chronic venous insufficiency bilaterally experiencing pain, swelling, heaviness aching despite conservative management with with compression stocking, rest elevation  She presenst after 3 months with LEVDR  LEVDR demonstrates bilateral superficial and deep venous insufficiency  HER GSV are large with significant reflux bilaterally  Had an extensive risks/benefits alternatives discussion  Including risk of E-HIT(2%)  and the patient consented to proceed with Left GSV EVLT + stab phlebectomy     I have spent 30 minutes with Patient  today in which greater than 50% of this time was spent in counseling/coordination of care regarding Diagnostic results, Prognosis, Risks and benefits of tx options, Intructions for management, Risk factor reductions and Impressions                   Relevant Orders    Case request operating room: ENDOVASCULAR LASER THERAPY (EVLT) + Stab Phlebectomy (10-20) (Completed)            Subjective:      Patient ID: Ina Escalona is a 61 y o  female  Patient is here to review an LEVDR 2/17/21  Patient has b/l painful bulging veins  Patient has tired and heavy legs  Patient has been using compression and elevation  Patient's Lt leg has been swelling  Patient has h/o of DVT's in the Rt leg  Patient has h/o of thrombophlebitis  The following portions of the patient's history were reviewed and updated as appropriate: allergies, current medications, past family history, past medical history, past social history, past surgical history and problem list     Review of Systems   Constitutional: Negative  HENT: Negative  Eyes: Negative  Respiratory: Negative  Cardiovascular: Positive for leg swelling (Lt leg)  Painful veins   Gastrointestinal: Negative  Endocrine: Negative  Genitourinary: Negative  Musculoskeletal:        Leg pain   Skin: Negative  Allergic/Immunologic: Negative  Neurological: Negative  Hematological: Bruises/bleeds easily  Psychiatric/Behavioral: Negative  I have reviewed and updated the ROS as appropriate  Objective:      /88 (BP Location: Right arm, Patient Position: Sitting, Cuff Size: Standard)   Pulse 96   Temp 97 8 °F (36 6 °C) (Tympanic)   Ht 5' 7" (1 702 m)   Wt 68 9 kg (152 lb)   BMI 23 81 kg/m²          Physical Exam  Constitutional:       Appearance: Normal appearance  HENT:      Head: Normocephalic and atraumatic  Mouth/Throat:      Mouth: Mucous membranes are dry  Eyes:      Extraocular Movements: Extraocular movements intact  Pupils: Pupils are equal, round, and reactive to light  Neck:      Musculoskeletal: Normal range of motion and neck supple  Cardiovascular:      Rate and Rhythm: Normal rate and regular rhythm  Pulses: Normal pulses  Heart sounds: Normal heart sounds     Pulmonary:      Effort: Pulmonary effort is normal       Breath sounds: Normal breath sounds  Abdominal:      General: Abdomen is flat  Palpations: Abdomen is soft  Musculoskeletal:         General: Swelling present  Right lower leg: Edema present  Left lower leg: Edema present  Skin:     General: Skin is warm and dry  Capillary Refill: Capillary refill takes less than 2 seconds  Neurological:      General: No focal deficit present  Mental Status: She is alert and oriented to person, place, and time  Psychiatric:         Mood and Affect: Mood normal          Behavior: Behavior normal          Thought Content:  Thought content normal          Judgment: Judgment normal              Operative Scheduling Information:    Hospital:  [facility]    Physician:  Me    Surgery: Left GSV EVLT + stab phlebectomy     Urgency:  Standard    Level:  Level 3: Outpatients to be scheduled for elective surgery than can be delayed up to 4 weeks without reasonable expectation of detriment to patient    Case Length:  Normal    Post-op Bed:  Outpatient    OR Table:  Standard    Equipment Needs:  Vascular technologist    Medication Instructions:  None    Hydration:  No

## 2021-02-23 NOTE — PROGRESS NOTES
Assessment/Plan:    Varicose veins of both lower extremities with inflammation  55yo Female with chronic venous insufficiency bilaterally experiencing pain, swelling, heaviness aching despite conservative management with with compression stocking, rest elevation  She presenst after 3 months with LEVDR  LEVDR demonstrates bilateral superficial and deep venous insufficiency  HER GSV are large with significant reflux bilaterally  Had an extensive risks/benefits alternatives discussion  Including risk of E-HIT(2%)  and the patient consented to proceed with Left GSV EVLT + stab phlebectomy     I have spent 30 minutes with Patient  today in which greater than 50% of this time was spent in counseling/coordination of care regarding Diagnostic results, Prognosis, Risks and benefits of tx options, Intructions for management, Risk factor reductions and Impressions  Problem List Items Addressed This Visit        Cardiovascular and Mediastinum    Thrombophlebitis of saphenous vein, right    Varicose veins of both lower extremities with inflammation - Primary     59yo Female with chronic venous insufficiency bilaterally experiencing pain, swelling, heaviness aching despite conservative management with with compression stocking, rest elevation  She presenst after 3 months with LEVDR  LEVDR demonstrates bilateral superficial and deep venous insufficiency  HER GSV are large with significant reflux bilaterally  Had an extensive risks/benefits alternatives discussion  Including risk of E-HIT(2%)  and the patient consented to proceed with Left GSV EVLT + stab phlebectomy     I have spent 30 minutes with Patient  today in which greater than 50% of this time was spent in counseling/coordination of care regarding Diagnostic results, Prognosis, Risks and benefits of tx options, Intructions for management, Risk factor reductions and Impressions                   Relevant Orders    Case request operating room: ENDOVASCULAR LASER THERAPY (EVLT) + Stab Phlebectomy (10-20) (Completed)            Subjective:      Patient ID: Андрей Duarte is a 61 y o  female  Patient is here to review an LEVDR 2/17/21  Patient has b/l painful bulging veins  Patient has tired and heavy legs  Patient has been using compression and elevation  Patient's Lt leg has been swelling  Patient has h/o of DVT's in the Rt leg  Patient has h/o of thrombophlebitis  The following portions of the patient's history were reviewed and updated as appropriate: allergies, current medications, past family history, past medical history, past social history, past surgical history and problem list     Review of Systems   Constitutional: Negative  HENT: Negative  Eyes: Negative  Respiratory: Negative  Cardiovascular: Positive for leg swelling (Lt leg)  Painful veins   Gastrointestinal: Negative  Endocrine: Negative  Genitourinary: Negative  Musculoskeletal:        Leg pain   Skin: Negative  Allergic/Immunologic: Negative  Neurological: Negative  Hematological: Bruises/bleeds easily  Psychiatric/Behavioral: Negative  I have reviewed and updated the ROS as appropriate  Objective:      /88 (BP Location: Right arm, Patient Position: Sitting, Cuff Size: Standard)   Pulse 96   Temp 97 8 °F (36 6 °C) (Tympanic)   Ht 5' 7" (1 702 m)   Wt 68 9 kg (152 lb)   BMI 23 81 kg/m²          Physical Exam  Constitutional:       Appearance: Normal appearance  HENT:      Head: Normocephalic and atraumatic  Mouth/Throat:      Mouth: Mucous membranes are dry  Eyes:      Extraocular Movements: Extraocular movements intact  Pupils: Pupils are equal, round, and reactive to light  Neck:      Musculoskeletal: Normal range of motion and neck supple  Cardiovascular:      Rate and Rhythm: Normal rate and regular rhythm  Pulses: Normal pulses  Heart sounds: Normal heart sounds     Pulmonary:      Effort: Pulmonary effort is normal       Breath sounds: Normal breath sounds  Abdominal:      General: Abdomen is flat  Palpations: Abdomen is soft  Musculoskeletal:         General: Swelling present  Right lower leg: Edema present  Left lower leg: Edema present  Skin:     General: Skin is warm and dry  Capillary Refill: Capillary refill takes less than 2 seconds  Neurological:      General: No focal deficit present  Mental Status: She is alert and oriented to person, place, and time  Psychiatric:         Mood and Affect: Mood normal          Behavior: Behavior normal          Thought Content:  Thought content normal          Judgment: Judgment normal              Operative Scheduling Information:    Hospital:  [facility]    Physician:  Me    Surgery: Left GSV EVLT + stab phlebectomy     Urgency:  Standard    Level:  Level 3: Outpatients to be scheduled for elective surgery than can be delayed up to 4 weeks without reasonable expectation of detriment to patient    Case Length:  Normal    Post-op Bed:  Outpatient    OR Table:  Standard    Equipment Needs:  Vascular technologist    Medication Instructions:  None    Hydration:  No

## 2021-02-23 NOTE — ASSESSMENT & PLAN NOTE
57yo Female with chronic venous insufficiency bilaterally experiencing pain, swelling, heaviness aching despite conservative management with with compression stocking, rest elevation  She presenst after 3 months with LEV  LEVDR demonstrates bilateral superficial and deep venous insufficiency  HER GSV are large with significant reflux bilaterally  Had an extensive risks/benefits alternatives discussion  Including risk of E-HIT(2%)  and the patient consented to proceed with Left GSV EVLT + stab phlebectomy     I have spent 30 minutes with Patient  today in which greater than 50% of this time was spent in counseling/coordination of care regarding Diagnostic results, Prognosis, Risks and benefits of tx options, Intructions for management, Risk factor reductions and Impressions

## 2021-02-26 ENCOUNTER — ANESTHESIA EVENT (OUTPATIENT)
Dept: PERIOP | Facility: AMBULARY SURGERY CENTER | Age: 56
End: 2021-02-26
Payer: COMMERCIAL

## 2021-02-26 ENCOUNTER — PREP FOR PROCEDURE (OUTPATIENT)
Dept: VASCULAR SURGERY | Facility: CLINIC | Age: 56
End: 2021-02-26

## 2021-02-26 DIAGNOSIS — I83.11 VARICOSE VEINS OF BOTH LOWER EXTREMITIES WITH INFLAMMATION: Primary | ICD-10-CM

## 2021-02-26 DIAGNOSIS — I83.12 VARICOSE VEINS OF BOTH LOWER EXTREMITIES WITH INFLAMMATION: Primary | ICD-10-CM

## 2021-02-26 NOTE — TELEPHONE ENCOUNTER
Lm for pt to schedule her L EVLT w/stab phlebs with Dr Patrick Burgos  I advised we can offer her 3/12/21 in [facility] but would need to hear back from her today so it can be submitted to her insurance

## 2021-02-26 NOTE — TELEPHONE ENCOUNTER
Effective 3/1/21 the patient's medical assistance will become Jam[employer] and her ID number is [id number]  Is patient requesting a call when authorization has been obtained? Patient would like to be called once an update is available  Surgery Date: 3/12/21  Primary Surgeon: Morteza Walker (NPI: [id number])  Assisting Surgeon: Not Applicable (N/A)  Facility: [facility] (Tax: [id number] / NPI: [id number])  Inpatient / Outpatient: Outpatient  Level: 4    Clearance Received: No clearance ordered  Consent Received: Yes, scanned into Epic on 2/26/21  Medication Hold / Last Dose: Not Applicable (N/A)  VQI Spreadsheet: Not Applicable (N/A)  IR Notified: Not Applicable (N/A)  Rep  Notified: Not Applicable (N/A)  Equipment Needs: Not Applicable (N/A)  Vas Lab Requested: 2/26/21  Patient Contacted: 2/26/21    Diagnosis: I83 11, N39 53  Procedure/ CPT Code(s): (EVLT) Endovenous Laser Treatment WITH Stab Phlebectomies of the left upper leg // CPT: 34613, 99192     For varicose vein related procedures, last LEVDR? Yes, patient's LEVDR was completed within 6-months of their procedure date  Post Operative Date/ Time: 3/15/21 , 9:30am [facility] [place]) with AWA Rodriguez (NPI: [id number])  Doppler to follow at 11am       Pt was scheduled for her L EVLT w/phlebs on 3/12/21 in [facility] with Dr Duane Minium

## 2021-03-01 NOTE — TELEPHONE ENCOUNTER
Faxed authorization request for patient's procedure  [facility] prior to receiving a determination  Please refer to VÍCTOR Mazariegos / Son Michael number [id number] for case updates

## 2021-03-10 NOTE — PRE-PROCEDURE INSTRUCTIONS
Pre-Surgery Instructions:   Medication Instructions    buPROPion (WELLBUTRIN XL) 150 mg 24 hr tablet Instructed patient per Anesthesia Guidelines   diclofenac sodium (VOLTAREN) 1 % Patient was instructed by Physician and understands   ergocalciferol (VITAMIN D2) 50,000 units Patient was instructed by Physician and understands   ibuprofen (MOTRIN) 600 mg tablet Patient was instructed by Physician and understands   Multiple Vitamin (MULTI-VITAMIN DAILY PO) Patient was instructed by Physician and understands   omeprazole (PriLOSEC) 20 mg delayed release capsule Instructed patient per Anesthesia Guidelines   umeclidinium-vilanterol (Anoro Ellipta) 62 5-25 MCG/INH inhaler Instructed patient per Anesthesia Guidelines  Pre op and bathing instructions reviewed  Pt has hibiclens  Pt  Verbalized understanding of current visitor restrictions  Pt  Verbalized an understanding of all instructions reviewed and offers no concerns at this time  Instructed to avoid all ASA/NSAIDs and OTC Vit/Supp from now until after surgery   Tylenol ok prn  Instructed to take per normal schedule including DOS with sips water DOS instructed to take Filipe Koyanagi a few sips of H2O and to use inhaler

## 2021-03-11 ENCOUNTER — TELEPHONE (OUTPATIENT)
Dept: VASCULAR SURGERY | Facility: CLINIC | Age: 56
End: 2021-03-11

## 2021-03-12 ENCOUNTER — HOSPITAL ENCOUNTER (OUTPATIENT)
Facility: AMBULARY SURGERY CENTER | Age: 56
Setting detail: OUTPATIENT SURGERY
Discharge: HOME | End: 2021-03-12
Attending: SURGERY | Admitting: SURGERY
Payer: COMMERCIAL

## 2021-03-12 ENCOUNTER — HOSPITAL ENCOUNTER (OUTPATIENT)
Dept: ULTRASOUND IMAGING | Facility: AMBULARY SURGERY CENTER | Age: 56
Discharge: HOME | End: 2021-03-12
Payer: COMMERCIAL

## 2021-03-12 ENCOUNTER — ANESTHESIA (OUTPATIENT)
Dept: PERIOP | Facility: AMBULARY SURGERY CENTER | Age: 56
End: 2021-03-12
Payer: COMMERCIAL

## 2021-03-12 VITALS
HEIGHT: 67 IN | RESPIRATION RATE: 18 BRPM | TEMPERATURE: 97.5 F | BODY MASS INDEX: 22.91 KG/M2 | WEIGHT: 146 LBS | HEART RATE: 72 BPM | OXYGEN SATURATION: 97 % | DIASTOLIC BLOOD PRESSURE: 69 MMHG | SYSTOLIC BLOOD PRESSURE: 136 MMHG

## 2021-03-12 DIAGNOSIS — I83.899 VARICOSE VEINS OF LOWER EXTREMITIES WITH COMPLICATIONS: ICD-10-CM

## 2021-03-12 DIAGNOSIS — I83.12 VARICOSE VEINS OF BOTH LOWER EXTREMITIES WITH INFLAMMATION: Primary | ICD-10-CM

## 2021-03-12 DIAGNOSIS — I83.11 VARICOSE VEINS OF BOTH LOWER EXTREMITIES WITH INFLAMMATION: Primary | ICD-10-CM

## 2021-03-12 PROCEDURE — 93971 EXTREMITY STUDY: CPT

## 2021-03-12 PROCEDURE — 36478 ENDOVENOUS LASER 1ST VEIN: CPT | Performed by: SURGERY

## 2021-03-12 PROCEDURE — 37765 STAB PHLEB VEINS XTR 10-20: CPT | Performed by: SURGERY

## 2021-03-12 RX ORDER — ALBUTEROL SULFATE 2.5 MG/3ML
2.5 SOLUTION RESPIRATORY (INHALATION) ONCE AS NEEDED
Status: DISCONTINUED | OUTPATIENT
Start: 2021-03-12 | End: 2021-03-12 | Stop reason: HOSPADM

## 2021-03-12 RX ORDER — FENTANYL CITRATE 50 UG/ML
INJECTION, SOLUTION INTRAMUSCULAR; INTRAVENOUS AS NEEDED
Status: DISCONTINUED | OUTPATIENT
Start: 2021-03-12 | End: 2021-03-12

## 2021-03-12 RX ORDER — MIDAZOLAM HYDROCHLORIDE 2 MG/2ML
INJECTION, SOLUTION INTRAMUSCULAR; INTRAVENOUS AS NEEDED
Status: DISCONTINUED | OUTPATIENT
Start: 2021-03-12 | End: 2021-03-12

## 2021-03-12 RX ORDER — OXYCODONE HYDROCHLORIDE 5 MG/1
5 TABLET ORAL EVERY 4 HOURS PRN
Status: DISCONTINUED | OUTPATIENT
Start: 2021-03-12 | End: 2021-03-12 | Stop reason: HOSPADM

## 2021-03-12 RX ORDER — ALBUTEROL SULFATE 2.5 MG/3ML
2.5 SOLUTION RESPIRATORY (INHALATION) ONCE AS NEEDED
Status: DISCONTINUED | OUTPATIENT
Start: 2021-03-12 | End: 2021-03-12

## 2021-03-12 RX ORDER — ONDANSETRON 2 MG/ML
4 INJECTION INTRAMUSCULAR; INTRAVENOUS ONCE AS NEEDED
Status: DISCONTINUED | OUTPATIENT
Start: 2021-03-12 | End: 2021-03-12 | Stop reason: HOSPADM

## 2021-03-12 RX ORDER — HYDROCODONE BITARTRATE AND ACETAMINOPHEN 5; 325 MG/1; MG/1
1 TABLET ORAL EVERY 6 HOURS PRN
Qty: 20 TABLET | Refills: 0 | Status: SHIPPED | OUTPATIENT
Start: 2021-03-12 | End: 2021-03-22

## 2021-03-12 RX ORDER — FENTANYL CITRATE/PF 50 MCG/ML
25 SYRINGE (ML) INJECTION
Status: DISCONTINUED | OUTPATIENT
Start: 2021-03-12 | End: 2021-03-12

## 2021-03-12 RX ORDER — SODIUM CHLORIDE, SODIUM LACTATE, POTASSIUM CHLORIDE, CALCIUM CHLORIDE 600; 310; 30; 20 MG/100ML; MG/100ML; MG/100ML; MG/100ML
125 INJECTION, SOLUTION INTRAVENOUS CONTINUOUS
Status: DISCONTINUED | OUTPATIENT
Start: 2021-03-12 | End: 2021-03-12

## 2021-03-12 RX ORDER — HYDROMORPHONE HCL/PF 1 MG/ML
0.4 SYRINGE (ML) INJECTION
Status: DISCONTINUED | OUTPATIENT
Start: 2021-03-12 | End: 2021-03-12 | Stop reason: HOSPADM

## 2021-03-12 RX ORDER — HYDROMORPHONE HCL/PF 1 MG/ML
0.2 SYRINGE (ML) INJECTION
Status: DISCONTINUED | OUTPATIENT
Start: 2021-03-12 | End: 2021-03-12

## 2021-03-12 RX ORDER — HYDROMORPHONE HCL/PF 1 MG/ML
0.2 SYRINGE (ML) INJECTION
Status: DISCONTINUED | OUTPATIENT
Start: 2021-03-12 | End: 2021-03-12 | Stop reason: HOSPADM

## 2021-03-12 RX ORDER — FENTANYL CITRATE/PF 50 MCG/ML
25 SYRINGE (ML) INJECTION
Status: DISCONTINUED | OUTPATIENT
Start: 2021-03-12 | End: 2021-03-12 | Stop reason: HOSPADM

## 2021-03-12 RX ORDER — SODIUM CHLORIDE, SODIUM LACTATE, POTASSIUM CHLORIDE, CALCIUM CHLORIDE 600; 310; 30; 20 MG/100ML; MG/100ML; MG/100ML; MG/100ML
50 INJECTION, SOLUTION INTRAVENOUS CONTINUOUS
Status: DISCONTINUED | OUTPATIENT
Start: 2021-03-12 | End: 2021-03-12 | Stop reason: HOSPADM

## 2021-03-12 RX ORDER — LIDOCAINE HYDROCHLORIDE 10 MG/ML
INJECTION, SOLUTION EPIDURAL; INFILTRATION; INTRACAUDAL; PERINEURAL AS NEEDED
Status: DISCONTINUED | OUTPATIENT
Start: 2021-03-12 | End: 2021-03-12

## 2021-03-12 RX ORDER — METOCLOPRAMIDE HYDROCHLORIDE 5 MG/ML
10 INJECTION INTRAMUSCULAR; INTRAVENOUS ONCE AS NEEDED
Status: DISCONTINUED | OUTPATIENT
Start: 2021-03-12 | End: 2021-03-12

## 2021-03-12 RX ORDER — ACETAMINOPHEN 325 MG/1
650 TABLET ORAL EVERY 4 HOURS PRN
Status: DISCONTINUED | OUTPATIENT
Start: 2021-03-12 | End: 2021-03-12 | Stop reason: HOSPADM

## 2021-03-12 RX ORDER — KETOROLAC TROMETHAMINE 30 MG/ML
INJECTION, SOLUTION INTRAMUSCULAR; INTRAVENOUS AS NEEDED
Status: DISCONTINUED | OUTPATIENT
Start: 2021-03-12 | End: 2021-03-12

## 2021-03-12 RX ORDER — CEFAZOLIN SODIUM 1 G/50ML
1000 SOLUTION INTRAVENOUS ONCE
Status: COMPLETED | OUTPATIENT
Start: 2021-03-12 | End: 2021-03-12

## 2021-03-12 RX ORDER — MAGNESIUM HYDROXIDE 1200 MG/15ML
LIQUID ORAL AS NEEDED
Status: DISCONTINUED | OUTPATIENT
Start: 2021-03-12 | End: 2021-03-12 | Stop reason: HOSPADM

## 2021-03-12 RX ORDER — ONDANSETRON 2 MG/ML
4 INJECTION INTRAMUSCULAR; INTRAVENOUS ONCE AS NEEDED
Status: DISCONTINUED | OUTPATIENT
Start: 2021-03-12 | End: 2021-03-12

## 2021-03-12 RX ORDER — PROPOFOL 10 MG/ML
INJECTION, EMULSION INTRAVENOUS AS NEEDED
Status: DISCONTINUED | OUTPATIENT
Start: 2021-03-12 | End: 2021-03-12

## 2021-03-12 RX ORDER — METOCLOPRAMIDE HYDROCHLORIDE 5 MG/ML
10 INJECTION INTRAMUSCULAR; INTRAVENOUS ONCE AS NEEDED
Status: DISCONTINUED | OUTPATIENT
Start: 2021-03-12 | End: 2021-03-12 | Stop reason: HOSPADM

## 2021-03-12 RX ORDER — DEXAMETHASONE SODIUM PHOSPHATE 10 MG/ML
INJECTION, SOLUTION INTRAMUSCULAR; INTRAVENOUS AS NEEDED
Status: DISCONTINUED | OUTPATIENT
Start: 2021-03-12 | End: 2021-03-12

## 2021-03-12 RX ORDER — LIDOCAINE HYDROCHLORIDE 10 MG/ML
0.5 INJECTION, SOLUTION EPIDURAL; INFILTRATION; INTRACAUDAL; PERINEURAL ONCE AS NEEDED
Status: DISCONTINUED | OUTPATIENT
Start: 2021-03-12 | End: 2021-03-12

## 2021-03-12 RX ORDER — ONDANSETRON 2 MG/ML
INJECTION INTRAMUSCULAR; INTRAVENOUS AS NEEDED
Status: DISCONTINUED | OUTPATIENT
Start: 2021-03-12 | End: 2021-03-12

## 2021-03-12 RX ORDER — CHLORHEXIDINE GLUCONATE 0.12 MG/ML
15 RINSE ORAL ONCE
Status: DISCONTINUED | OUTPATIENT
Start: 2021-03-12 | End: 2021-03-12

## 2021-03-12 RX ADMIN — ONDANSETRON 4 MG: 2 INJECTION INTRAMUSCULAR; INTRAVENOUS at 12:43

## 2021-03-12 RX ADMIN — DEXAMETHASONE SODIUM PHOSPHATE 4 MG: 10 INJECTION, SOLUTION INTRAMUSCULAR; INTRAVENOUS at 12:43

## 2021-03-12 RX ADMIN — LIDOCAINE HYDROCHLORIDE 50 MG: 10 INJECTION, SOLUTION EPIDURAL; INFILTRATION; INTRACAUDAL at 12:43

## 2021-03-12 RX ADMIN — FENTANYL CITRATE 25 MCG: 50 INJECTION INTRAMUSCULAR; INTRAVENOUS at 14:08

## 2021-03-12 RX ADMIN — PROPOFOL 180 MG: 10 INJECTION, EMULSION INTRAVENOUS at 12:43

## 2021-03-12 RX ADMIN — FENTANYL CITRATE 25 MCG: 50 INJECTION, SOLUTION INTRAMUSCULAR; INTRAVENOUS at 13:12

## 2021-03-12 RX ADMIN — FENTANYL CITRATE 25 MCG: 50 INJECTION, SOLUTION INTRAMUSCULAR; INTRAVENOUS at 13:13

## 2021-03-12 RX ADMIN — SODIUM CHLORIDE, SODIUM LACTATE, POTASSIUM CHLORIDE, AND CALCIUM CHLORIDE 125 ML/HR: .6; .31; .03; .02 INJECTION, SOLUTION INTRAVENOUS at 11:18

## 2021-03-12 RX ADMIN — FENTANYL CITRATE 50 MCG: 50 INJECTION, SOLUTION INTRAMUSCULAR; INTRAVENOUS at 12:48

## 2021-03-12 RX ADMIN — KETOROLAC TROMETHAMINE 30 MG: 30 INJECTION, SOLUTION INTRAMUSCULAR; INTRAVENOUS at 13:44

## 2021-03-12 RX ADMIN — CEFAZOLIN SODIUM 1000 MG: 1 SOLUTION INTRAVENOUS at 12:43

## 2021-03-12 RX ADMIN — MIDAZOLAM HYDROCHLORIDE 2 MG: 1 INJECTION, SOLUTION INTRAMUSCULAR; INTRAVENOUS at 12:39

## 2021-03-12 NOTE — ANESTHESIA POSTPROCEDURE EVALUATION
Post-Op Assessment Note    CV Status:  Stable    Pain management: adequate     Mental Status:  Alert and awake   Hydration Status:  Euvolemic   PONV Controlled:  Controlled   Airway Patency:  Patent      Post Op Vitals Reviewed: Yes      Staff: CRNA   Comments: Pt awake, alert, able to maintain own airway, VSS, report to recovery RN        No complications documented      /71 (03/12/21 1351)    Temp (!) 96 6 °F (35 9 °C) (03/12/21 1351)    Pulse 74 (03/12/21 1351)   Resp 20 (03/12/21 1351)    SpO2 98 % (03/12/21 1351)

## 2021-03-12 NOTE — INTERVAL H&P NOTE
H&P reviewed  After examining the patient I find no changes in the patients condition since the H&P had been written      Vitals:    03/12/21 1110   BP: 155/88   Pulse: 91   Resp: 18   Temp: (!) 97 2 °F (36 2 °C)   SpO2: 98%     Plan: Left GSV EVLT + stab phlebectomy

## 2021-03-12 NOTE — DISCHARGE INSTRUCTIONS
Endovenous Ablation   WHAT YOU NEED TO KNOW:   Endovenous ablation is a procedure that uses radiofrequency or laser energy to treat varicose veins  Varicose veins are large, twisted veins in your legs that bulge out under your skin  Endovenous ablation may help treat pain, discolored skin, or ulcers in your leg that are caused by varicose veins  DISCHARGE INSTRUCTIONS:   Call 911 for any of the following:   · You feel lightheaded, short of breath, and have chest pain  · You cough up blood  · You have trouble breathing  Seek care immediately if:   · Blood soaks through your bandage  · Your leg feels warm, tender and painful  · Any part of your leg is numb or pale to the touch  Contact your healthcare provider if:   · You have a fever or chills  · Your wound is red, swollen, or draining pus  · You have nausea or vomiting  · Your skin is itchy, swollen, or you have a rash  · You have questions or concerns about your condition or care  Medicines: You may need any of the following:  · Prescription pain medicine  may be given  Ask your healthcare provider how to take this medicine safely  Some prescription pain medicines contain acetaminophen  Do not take other medicines that contain acetaminophen without talking to your healthcare provider  Too much acetaminophen may cause liver damage  Prescription pain medicine may cause constipation  Ask your healthcare provider how to prevent or treat constipation  · NSAIDs , such as ibuprofen, help decrease swelling, pain, and fever  NSAIDs can cause stomach bleeding or kidney problems in certain people  If you take blood thinner medicine, always ask your healthcare provider if NSAIDs are safe for you  Always read the medicine label and follow directions  · Take your medicine as directed  Contact your healthcare provider if you think your medicine is not helping or if you have side effects   Tell him or her if you are allergic to any medicine  Keep a list of the medicines, vitamins, and herbs you take  Include the amounts, and when and why you take them  Bring the list or the pill bottles to follow-up visits  Carry your medicine list with you in case of an emergency  Care for your wound as directed:  Carefully wash the wound with soap and water  Dry the area and put on new, clean bandages as directed  Change your bandages when they get wet or dirty  Check your incision for signs of infection such as redness, swelling, or pus  Self-care:   · Stay active  Do not spend too much time sitting or standing  Take short walks throughout the day  Walking will improve blood flow and prevent blood clots in your leg  Ask your healthcare provider how much activity you need to do every day  · Wear compression stockings or bandages as directed  The stockings and bandages are snug and put pressure on your legs  This improves blood flow and helps prevent clots  · Apply a warm compress as directed  A warm compress can be a small towel or washcloth  Moisten it in warm (not hot) water  Apply the warm compress to your leg  A warm compress may help with discomfort from injury to your vein during the procedure  · Elevate your leg  above the level of your heart when you are not moving  This will help decrease swelling and pain  Prop your leg on pillows or blankets to keep it elevated comfortably  Follow up with your healthcare provider as directed:  Write down your questions so you remember to ask them during your visits  © Copyright [address] Information is for End User's use only and may not be sold, redistributed or otherwise used for commercial purposes  All illustrations and images included in CareNotes® are the copyrighted property of [employer]  or [facility] Bryanna Ariza   The above information is an  only  It is not intended as medical advice for individual conditions or treatments   Talk to your doctor, nurse or pharmacist before following any medical regimen to see if it is safe and effective for you

## 2021-03-12 NOTE — OP NOTE
OPERATIVE REPORT  PATIENT NAME: Richy Balderrama    :  1965  MRN: [id number]  Pt Location: [facility] OR [facility] 05    SURGERY DATE: 3/12/2021    Surgeon(s) and Role:     May Cunningham MD - Primary    Preop Diagnosis:  Varicose veins of both lower extremities with inflammation [I83 11, I83 12]    Post-Op Diagnosis Codes: * Varicose veins of both lower extremities with inflammation [I83 11, I83 12]    Procedure(s) (LRB):  ENDOVASCULAR LASER THERAPY (EVLT) + Stab Phlebectomy (10-20) (Left)    Specimen(s):  * No specimens in log *    Estimated Blood Loss:   Minimal    Drains:  * No LDAs found *    Anesthesia Type:   General    Operative Indications:  Varicose veins of both lower extremities with inflammation [I83 11, I83 12]      Operative Findings:  none    Complications:   None    Procedure and Technique:  Procedure and Technique:    The patient Richy Balderrama was identified in the operating room after adequate laryngeal mask anesthesia was obtained the left  leg was prepped and draped using Betadine prep and Sterile drapes  Under ultrasound guidance saphenous vein was identified, micropuncture needle wire and catheter were placed, J-wire to the saphenofemoral junction followed by the laser sheath  The laser was engaged at 7 W of power for proximally 114 seconds  Completion duplex imaging showed wide patency of the common femoral vein with no evidence of deep vein thrombosis  The saphenous vein was obliterated along its course  Multiple stab phebectomies 10 were performed, varicosities were excised  Quarter-inch Steri-Strips were placed and a sterile compressive dressing was applied Estimated blood loss was minimal  The patient was taken to the recovery room in stable condition    A qualified resident physician was not available     I was present for the entire procedure and I was present for all critical portions of the procedure    Patient Disposition:  PACU     SIGNATURE: Minh Madrid MD  DATE:  2021  TIME: 1:45 PM

## 2021-03-15 ENCOUNTER — HOSPITAL ENCOUNTER (OUTPATIENT)
Dept: NON INVASIVE DIAGNOSTICS | Facility: CLINIC | Age: 56
Discharge: HOME | End: 2021-03-15
Payer: COMMERCIAL

## 2021-03-15 ENCOUNTER — OFFICE VISIT (OUTPATIENT)
Dept: VASCULAR SURGERY | Facility: CLINIC | Age: 56
End: 2021-03-15

## 2021-03-15 VITALS
HEIGHT: 67 IN | DIASTOLIC BLOOD PRESSURE: 86 MMHG | HEART RATE: 105 BPM | BODY MASS INDEX: 23.54 KG/M2 | WEIGHT: 150 LBS | TEMPERATURE: 99 F | SYSTOLIC BLOOD PRESSURE: 146 MMHG

## 2021-03-15 DIAGNOSIS — I83.12 VARICOSE VEINS OF BOTH LOWER EXTREMITIES WITH INFLAMMATION: Primary | ICD-10-CM

## 2021-03-15 DIAGNOSIS — I83.11 VARICOSE VEINS OF BOTH LOWER EXTREMITIES WITH INFLAMMATION: Primary | ICD-10-CM

## 2021-03-15 DIAGNOSIS — I83.11 VARICOSE VEINS OF BOTH LOWER EXTREMITIES WITH INFLAMMATION: ICD-10-CM

## 2021-03-15 DIAGNOSIS — I83.12 VARICOSE VEINS OF BOTH LOWER EXTREMITIES WITH INFLAMMATION: ICD-10-CM

## 2021-03-15 PROCEDURE — NC001 PR NO CHARGE: Performed by: SURGERY

## 2021-03-15 PROCEDURE — 93971 EXTREMITY STUDY: CPT | Performed by: SURGERY

## 2021-03-15 PROCEDURE — 99024 POSTOP FOLLOW-UP VISIT: CPT | Performed by: PHYSICIAN ASSISTANT

## 2021-03-15 PROCEDURE — 93971 EXTREMITY STUDY: CPT

## 2021-03-15 NOTE — PROGRESS NOTES
Assessment/Plan:    Varicose veins of both lower extremities with inflammation  63yo Female with long standing history of chronic venous insufficiency and varicose veins bilaterally presents s/p L EVLT (3/12/21 [facility]) for post operative check      -Patient doing well  Denies pain or swelling    -Incisions C/D/I with steri strips  Some mild bruising as expected post operatively    -Pain controlled with tylenol   -Plan for LEV today to r/o EHIT  Advised patient that if there is evidence of EHIT she may require anticoagulation    -Follow up in the office in 6 weeks  -Continue elevation, compression  -All questions and concerned addressed        Diagnoses and all orders for this visit:    Varicose veins of both lower extremities with inflammation          Subjective:      Patient ID: Pierce Fowler is a 61 y o  female  Patient is PO L EVLT on 3/12/21 by Dr Kaiden Haro  Incisions look clean and dry  Patient has very minimal bruising and swelling  Patient denies pain  61year old female with varicose veins of the bilateral lower extremities presents s/p L EVLT for post operative check  Patient has been doing well  She denies pain, swelling, SOB or chest pain  She has been taking tylenol which has been controlling her pain  The following portions of the patient's history were reviewed and updated as appropriate: allergies, current medications, past family history, past medical history, past social history, past surgical history and problem list     Review of Systems   Constitutional: Negative  HENT: Negative  Eyes: Negative  Respiratory: Negative  Cardiovascular: Negative  Gastrointestinal: Negative  Endocrine: Negative  Genitourinary: Negative  Musculoskeletal: Negative  Skin: Negative  Allergic/Immunologic: Negative  Neurological: Negative  Hematological: Negative  Psychiatric/Behavioral: Negative          I have reviewed and made appropriate changes to the review of systems input by the medical assistant      Vitals:    03/15/21 0934   BP: 146/86   BP Location: Right arm   Patient Position: Sitting   Cuff Size: Standard   Pulse: 105   Temp: 99 °F (37 2 °C)   TempSrc: Tympanic   Weight: 68 kg (150 lb)   Height: 5' 7" (1 702 m)       Patient Active Problem List   Diagnosis    Patellofemoral arthritis of left knee    Thrombophlebitis of saphenous vein, right    Varicose veins of both lower extremities with inflammation    Cigarette nicotine dependence in remission    Menopause    Vitamin D deficiency    Abnormal chest CT    Pulmonary fibrosis (HCC)    Centrilobular emphysema (HCC)    Pulmonary nodules    Gastroesophageal reflux disease without esophagitis       Past Surgical History:   Procedure Laterality Date    CHOLECYSTECTOMY      ENDOVASCULAR LASER THERAPY (EVLT)      Left    NH WRIST ARTHROSCOP,RELEASE Lawerance Bloodgood LIG Left 5/19/2017    Procedure: ENDOSCOPIC CARPAL TUNNEL RELEASE ;  Surgeon: Mercedes Snyder MD;  [facility];  Service: Orthopedics    TONSILLECTOMY         Family History   Problem Relation Age of Onset    Cancer Father [de-identified]        thinks colon cancer    Heart attack Father 76    Heart attack Mother     Thyroid disease Mother     Atrial fibrillation Mother         pacemaker    Lung cancer Sister         (they share a mother only)        Social History     Socioeconomic History    Marital status: Legally      Spouse name: Not on file    Number of children: 2    Years of education: Not on file    Highest education level: Not on file   Occupational History    Not on file   Social Needs    Financial resource strain: Not on file    Food insecurity     Worry: Not on file     Inability: Not on file    Transportation needs     Medical: Not on file     Non-medical: Not on file   Tobacco Use    Smoking status: Former Smoker     Packs/day: 1 00     Years: 30 00     Pack years: 30 00     Start date: 12/13/2020    Smokeless tobacco: Never Used    Tobacco comment: last cig 12/22/2020   Substance and Sexual Activity    Alcohol use:  Yes     Alcohol/week: 7 0 standard drinks     Types: 7 Glasses of wine per week     Frequency: Monthly or less     Drinks per session: 1 or 2    Drug use: No    Sexual activity: Not Currently   Lifestyle    Physical activity     Days per week: Not on file     Minutes per session: Not on file    Stress: Not on file   Relationships    Social connections     Talks on phone: Not on file     Gets together: Not on file     Attends [employer] service: Not on file     Active member of club or organization: Not on file     Attends meetings of clubs or organizations: Not on file     Relationship status: Not on file    Intimate partner violence     Fear of current or ex partner: Not on file     Emotionally abused: Not on file     Physically abused: Not on file     Forced sexual activity: Not on file   Other Topics Concern    Not on file   Social History Narrative    Not on file       Allergies   Allergen Reactions    Seasonal Ic  [Cholestatin]          Current Outpatient Medications:     buPROPion (WELLBUTRIN XL) 150 mg 24 hr tablet, Take 1 tablet (150 mg total) by mouth every morning, Disp: 30 tablet, Rfl: 2    diclofenac sodium (VOLTAREN) 1 %, Apply 2 g topically 4 (four) times a day, Disp: , Rfl:     ergocalciferol (VITAMIN D2) 50,000 units, Take 1 capsule (50,000 Units total) by mouth once a week for 12 doses (Patient taking differently: Take 50,000 Units by mouth once a week ), Disp: 12 capsule, Rfl: 0    ibuprofen (MOTRIN) 600 mg tablet, Take 1 tablet by mouth every 6 (six) hours as needed for mild pain or moderate pain for up to 30 days, Disp: 10 tablet, Rfl: 0    Multiple Vitamin (MULTI-VITAMIN DAILY PO), Take 1 tablet by mouth daily, Disp: , Rfl:     omeprazole (PriLOSEC) 20 mg delayed release capsule, Take 1 capsule (20 mg total) by mouth daily, Disp: 30 capsule, Rfl: 3    umeclidinium-vilanterol (Anoro Ellipta) 62 5-25 MCG/INH inhaler, Inhale 1 puff daily, Disp: 1 Inhaler, Rfl: 5    HYDROcodone-acetaminophen (NORCO) 5-325 mg per tablet, Take 1 tablet by mouth every 6 (six) hours as needed for pain for up to 10 daysMax Daily Amount: 4 tablets (Patient not taking: Reported on 3/15/2021), Disp: 20 tablet, Rfl: 0    Objective:      /86 (BP Location: Right arm, Patient Position: Sitting, Cuff Size: Standard)   Pulse 105   Temp 99 °F (37 2 °C) (Tympanic)   Ht 5' 7" (1 702 m)   Wt 68 kg (150 lb)   BMI 23 49 kg/m²          Physical Exam  Constitutional:       General: She is not in acute distress  Appearance: Normal appearance  She is obese  She is not ill-appearing, toxic-appearing or diaphoretic  HENT:      Head: Normocephalic and atraumatic  Neck:      Musculoskeletal: Normal range of motion and neck supple  Cardiovascular:      Rate and Rhythm: Normal rate and regular rhythm  Pulses:           Dorsalis pedis pulses are 2+ on the right side and 2+ on the left side  Heart sounds: Normal heart sounds  Pulmonary:      Effort: Pulmonary effort is normal  No respiratory distress  Breath sounds: Normal breath sounds  Musculoskeletal: Normal range of motion  Right lower leg: No edema  Left lower leg: No edema  Skin:     General: Skin is warm and dry  Capillary Refill: Capillary refill takes less than 2 seconds  Comments: Incisions clean, dry, intact with steri-strips  Some bruising to the inner thigh and proximal left shin near incision sites  Neurological:      General: No focal deficit present  Mental Status: She is alert and oriented to person, place, and time  Sensory: No sensory deficit  Motor: No weakness     Psychiatric:         Mood and Affect: Mood normal          Behavior: Behavior normal

## 2021-03-15 NOTE — ASSESSMENT & PLAN NOTE
63yo Female with long standing history of chronic venous insufficiency and varicose veins bilaterally presents s/p L EVLT (3/12/21 [facility]) for post operative check      -Patient doing well  Denies pain or swelling    -Incisions C/D/I with steri strips  Some mild bruising as expected post operatively    -Pain controlled with tylenol   -Plan for LEV today to r/o EHIT   Advised patient that if there is evidence of EHIT she may require anticoagulation    -Follow up in the office in 6 weeks  -Continue elevation, compression  -All questions and concerned addressed

## 2021-03-15 NOTE — PATIENT INSTRUCTIONS
Endovenous Ablation   WHAT YOU NEED TO KNOW:   Endovenous ablation is a procedure that uses radiofrequency or laser energy to treat varicose veins  Varicose veins are large, twisted veins in your legs that bulge out under your skin  Endovenous ablation may help treat pain, discolored skin, or ulcers in your leg that are caused by varicose veins  DISCHARGE INSTRUCTIONS:   Call 911 for any of the following:   · You feel lightheaded, short of breath, and have chest pain  · You cough up blood  · You have trouble breathing  Seek care immediately if:   · Blood soaks through your bandage  · Your leg feels warm, tender and painful  · Any part of your leg is numb or pale to the touch  Contact your healthcare provider if:   · You have a fever or chills  · Your wound is red, swollen, or draining pus  · You have nausea or vomiting  · Your skin is itchy, swollen, or you have a rash  · You have questions or concerns about your condition or care  Medicines: You may need any of the following:  · Prescription pain medicine  may be given  Ask your healthcare provider how to take this medicine safely  Some prescription pain medicines contain acetaminophen  Do not take other medicines that contain acetaminophen without talking to your healthcare provider  Too much acetaminophen may cause liver damage  Prescription pain medicine may cause constipation  Ask your healthcare provider how to prevent or treat constipation  · NSAIDs , such as ibuprofen, help decrease swelling, pain, and fever  NSAIDs can cause stomach bleeding or kidney problems in certain people  If you take blood thinner medicine, always ask your healthcare provider if NSAIDs are safe for you  Always read the medicine label and follow directions  · Take your medicine as directed  Contact your healthcare provider if you think your medicine is not helping or if you have side effects   Tell him or her if you are allergic to any medicine  Keep a list of the medicines, vitamins, and herbs you take  Include the amounts, and when and why you take them  Bring the list or the pill bottles to follow-up visits  Carry your medicine list with you in case of an emergency  Care for your wound as directed:  Carefully wash the wound with soap and water  Dry the area and put on new, clean bandages as directed  Change your bandages when they get wet or dirty  Check your incision for signs of infection such as redness, swelling, or pus  Self-care:   · Stay active  Do not spend too much time sitting or standing  Take short walks throughout the day  Walking will improve blood flow and prevent blood clots in your leg  Ask your healthcare provider how much activity you need to do every day  · Wear compression stockings or bandages as directed  The stockings and bandages are snug and put pressure on your legs  This improves blood flow and helps prevent clots  · Apply a warm compress as directed  A warm compress can be a small towel or washcloth  Moisten it in warm (not hot) water  Apply the warm compress to your leg  A warm compress may help with discomfort from injury to your vein during the procedure  · Elevate your leg  above the level of your heart when you are not moving  This will help decrease swelling and pain  Prop your leg on pillows or blankets to keep it elevated comfortably  Follow up with your healthcare provider as directed:  Write down your questions so you remember to ask them during your visits  © Copyright [address] Information is for End User's use only and may not be sold, redistributed or otherwise used for commercial purposes  All illustrations and images included in CareNotes® are the copyrighted property of [employer]  or [facility] Bryanna Ariza   The above information is an  only  It is not intended as medical advice for individual conditions or treatments   Talk to your doctor, nurse or pharmacist before following any medical regimen to see if it is safe and effective for you

## 2021-03-25 DIAGNOSIS — K21.9 GASTROESOPHAGEAL REFLUX DISEASE WITHOUT ESOPHAGITIS: ICD-10-CM

## 2021-03-25 DIAGNOSIS — J43.2 CENTRILOBULAR EMPHYSEMA (HCC): ICD-10-CM

## 2021-03-26 ENCOUNTER — TELEPHONE (OUTPATIENT)
Dept: FAMILY MEDICINE CLINIC | Facility: CLINIC | Age: 56
End: 2021-03-26

## 2021-03-26 ENCOUNTER — HOSPITAL ENCOUNTER (OUTPATIENT)
Dept: CT IMAGING | Facility: HOSPITAL | Age: 56
Discharge: HOME | End: 2021-03-26
Attending: INTERNAL MEDICINE
Payer: COMMERCIAL

## 2021-03-26 DIAGNOSIS — J84.10 PULMONARY FIBROSIS (HCC): ICD-10-CM

## 2021-03-26 DIAGNOSIS — R93.89 ABNORMAL CHEST CT: ICD-10-CM

## 2021-03-26 DIAGNOSIS — R91.8 PULMONARY NODULES: ICD-10-CM

## 2021-03-26 PROCEDURE — 71250 CT THORAX DX C-: CPT

## 2021-03-26 PROCEDURE — G1004 CDSM NDSC: HCPCS

## 2021-03-26 RX ORDER — UMECLIDINIUM BROMIDE AND VILANTEROL TRIFENATATE 62.5; 25 UG/1; UG/1
1 POWDER RESPIRATORY (INHALATION) DAILY
Qty: 1 INHALER | Refills: 0 | Status: SHIPPED | OUTPATIENT
Start: 2021-03-26 | End: 2021-04-20 | Stop reason: SDUPTHER

## 2021-03-26 RX ORDER — OMEPRAZOLE 20 MG/1
20 CAPSULE, DELAYED RELEASE ORAL DAILY
Qty: 30 CAPSULE | Refills: 0 | Status: SHIPPED | OUTPATIENT
Start: 2021-03-26 | End: 2021-05-06 | Stop reason: SDUPTHER

## 2021-03-26 NOTE — TELEPHONE ENCOUNTER
Left detailed message (Deandre Pina per communication form in chart ) informing patient  and to call the office with any further questions or concerns

## 2021-04-01 ENCOUNTER — HOSPITAL ENCOUNTER (OUTPATIENT)
Dept: MAMMOGRAPHY | Facility: HOSPITAL | Age: 56
Discharge: HOME | End: 2021-04-01
Attending: FAMILY MEDICINE
Payer: COMMERCIAL

## 2021-04-01 VITALS — BODY MASS INDEX: 23.54 KG/M2 | WEIGHT: 150 LBS | HEIGHT: 67 IN

## 2021-04-01 DIAGNOSIS — Z12.31 ENCOUNTER FOR SCREENING MAMMOGRAM FOR MALIGNANT NEOPLASM OF BREAST: ICD-10-CM

## 2021-04-01 PROCEDURE — 77063 BREAST TOMOSYNTHESIS BI: CPT

## 2021-04-01 PROCEDURE — 77067 SCR MAMMO BI INCL CAD: CPT

## 2021-04-02 NOTE — RESULT ENCOUNTER NOTE
Deonte Wiseman,  Your mammogram is normal  We recommend you schedule your next mammogram in one year     Have a good day,   Dr Shirley Milian

## 2021-04-14 ENCOUNTER — LAB (OUTPATIENT)
Dept: LAB | Facility: CLINIC | Age: 56
End: 2021-04-14
Payer: COMMERCIAL

## 2021-04-14 DIAGNOSIS — J84.10 PULMONARY FIBROSIS (HCC): ICD-10-CM

## 2021-04-14 DIAGNOSIS — Z11.59 NEED FOR HEPATITIS C SCREENING TEST: ICD-10-CM

## 2021-04-14 DIAGNOSIS — E55.9 VITAMIN D DEFICIENCY: ICD-10-CM

## 2021-04-14 DIAGNOSIS — Z11.4 SCREENING FOR HIV (HUMAN IMMUNODEFICIENCY VIRUS): ICD-10-CM

## 2021-04-14 DIAGNOSIS — Z20.1 EXPOSURE TO TB: ICD-10-CM

## 2021-04-14 DIAGNOSIS — M19.90 ARTHRITIS: ICD-10-CM

## 2021-04-14 LAB
25(OH)D3 SERPL-MCNC: 23.4 NG/ML (ref 30–100)
HCV AB SER QL: NORMAL
RHEUMATOID FACT SER QL LA: NEGATIVE

## 2021-04-14 PROCEDURE — 87389 HIV-1 AG W/HIV-1&-2 AB AG IA: CPT

## 2021-04-14 PROCEDURE — 86038 ANTINUCLEAR ANTIBODIES: CPT

## 2021-04-14 PROCEDURE — 82306 VITAMIN D 25 HYDROXY: CPT

## 2021-04-14 PROCEDURE — 86480 TB TEST CELL IMMUN MEASURE: CPT

## 2021-04-14 PROCEDURE — 86255 FLUORESCENT ANTIBODY SCREEN: CPT

## 2021-04-14 PROCEDURE — 86430 RHEUMATOID FACTOR TEST QUAL: CPT

## 2021-04-14 PROCEDURE — 86803 HEPATITIS C AB TEST: CPT

## 2021-04-14 PROCEDURE — 86235 NUCLEAR ANTIGEN ANTIBODY: CPT

## 2021-04-14 PROCEDURE — 36415 COLL VENOUS BLD VENIPUNCTURE: CPT

## 2021-04-15 LAB
ENA SCL70 AB SER-ACNC: <0.2 AI (ref 0–0.9)
ENA SS-A AB SER-ACNC: <0.2 AI (ref 0–0.9)
ENA SS-B AB SER-ACNC: <0.2 AI (ref 0–0.9)
GAMMA INTERFERON BACKGROUND BLD IA-ACNC: 0.03 IU/ML
HIV 1+2 AB+HIV1 P24 AG SERPL QL IA: NORMAL
M TB IFN-G BLD-IMP: NEGATIVE
M TB IFN-G CD4+ BCKGRND COR BLD-ACNC: -0.01 IU/ML
M TB IFN-G CD4+ BCKGRND COR BLD-ACNC: -0.01 IU/ML
MITOGEN IGNF BCKGRD COR BLD-ACNC: >10 IU/ML

## 2021-04-16 DIAGNOSIS — E55.9 VITAMIN D DEFICIENCY: ICD-10-CM

## 2021-04-16 LAB
C-ANCA TITR SER IF: NORMAL TITER
MYELOPEROXIDASE AB SER IA-ACNC: <9 U/ML (ref 0–9)
P-ANCA ATYPICAL TITR SER IF: NORMAL TITER
P-ANCA TITR SER IF: NORMAL TITER
PROTEINASE3 AB SER IA-ACNC: <3.5 U/ML (ref 0–3.5)
RYE IGE QN: NEGATIVE

## 2021-04-16 RX ORDER — ERGOCALCIFEROL 1.25 MG/1
50000 CAPSULE ORAL WEEKLY
Qty: 12 CAPSULE | Refills: 0 | Status: SHIPPED | OUTPATIENT
Start: 2021-04-16 | End: 2022-03-25

## 2021-04-16 NOTE — RESULT ENCOUNTER NOTE
See result note to patient:  Surya Collins,   Your results are back for the blood work  Your vitamin D is still low  I will send in another round of prescription vitamin D for you  After 3 months of taking this you can then start over the counter vitamin D 2,000 iu daily  Let me know if you have any questions   Take care,   Alberto Friedman, DO

## 2021-04-20 ENCOUNTER — OFFICE VISIT (OUTPATIENT)
Dept: PULMONOLOGY | Facility: CLINIC | Age: 56
End: 2021-04-20
Payer: COMMERCIAL

## 2021-04-20 VITALS
OXYGEN SATURATION: 98 % | WEIGHT: 154.6 LBS | BODY MASS INDEX: 24.27 KG/M2 | DIASTOLIC BLOOD PRESSURE: 80 MMHG | SYSTOLIC BLOOD PRESSURE: 140 MMHG | HEIGHT: 67 IN | TEMPERATURE: 98.2 F | HEART RATE: 94 BPM

## 2021-04-20 DIAGNOSIS — F17.211 CIGARETTE NICOTINE DEPENDENCE IN REMISSION: ICD-10-CM

## 2021-04-20 DIAGNOSIS — J43.2 CENTRILOBULAR EMPHYSEMA (HCC): ICD-10-CM

## 2021-04-20 DIAGNOSIS — R91.8 PULMONARY NODULES: Primary | ICD-10-CM

## 2021-04-20 DIAGNOSIS — J84.10 PULMONARY FIBROSIS (HCC): ICD-10-CM

## 2021-04-20 PROCEDURE — 99214 OFFICE O/P EST MOD 30 MIN: CPT | Performed by: INTERNAL MEDICINE

## 2021-04-20 RX ORDER — UMECLIDINIUM BROMIDE AND VILANTEROL TRIFENATATE 62.5; 25 UG/1; UG/1
1 POWDER RESPIRATORY (INHALATION) DAILY
Qty: 1 INHALER | Refills: 5 | Status: SHIPPED | OUTPATIENT
Start: 2021-04-20 | End: 2021-10-25

## 2021-04-20 NOTE — ASSESSMENT & PLAN NOTE
She has a few pulmonary nodules, the largest in the right lower lobe measuring 1 cm  This has shown no significant change over serial imaging, initially identified in December 2020  Plan to repeat CT in 6 months    If there is any interval enlargement, would need to proceed with additional testing

## 2021-04-20 NOTE — PROGRESS NOTES
Pulmonary Follow Up Note   Pastor Gomez 61 y o  female MRN: [id number]  2021      Assessment/Plan:     Centrilobular emphysema ([address] )  Chest CT findings are most notable for diffuse emphysematous changes  Fortunately, she has no significant obstruction on PFTs  She has found Anoro Ellipta to be beneficial and we will continue with this inhaler  I have sent refills to her pharmacy  Pulmonary fibrosis ([address] )  She has a focal area of fibrosis, likely related to prior infectious or inflammatory insult  She had been very concerned because her sister had  of what sounds like idiopathic pulmonary fibrosis  I reassured her that this pattern is not consistent with the diagnosis, but we will continue to monitor her imaging  Rheumatologic serologies were all unremarkable  Pulmonary nodules  She has a few pulmonary nodules, the largest in the right lower lobe measuring 1 cm  This has shown no significant change over serial imaging, initially identified in 2020  Plan to repeat CT in 6 months  If there is any interval enlargement, would need to proceed with additional testing     Cigarette nicotine dependence in remission  Once we have demonstrates stability of the pulmonary nodules, she will need annual lung cancer screening CT given her prior longstanding history of smoking  Visit orders:    Diagnoses and all orders for this visit:    Pulmonary nodules  -     CT chest without contrast; Future    Centrilobular emphysema ([address] )  -     CT chest without contrast; Future  -     umeclidinium-vilanterol (Anoro Ellipta) 62 5-25 MCG/INH inhaler; Inhale 1 puff daily    Cigarette nicotine dependence in remission    Pulmonary fibrosis (HCC)        No follow-ups on file  History of Present Illness   HPI:  Pastor Gomez is a 61 y o  female who is here today for follow-up regarding test results  She has been stable from pulmonary perspective  She has no significant cough, wheeze or sputum production  She has some shortness of breath with exertion which has been stable  She finds Anoro Ellipta to be beneficial and he is using on a daily basis  She successfully quit smoking with the assistance of Wellbutrin, but is no longer taking it  She has had a few cravings, but has been able to largely resist them  Review of Systems   Constitutional: Negative for chills, fever and unexpected weight change  HENT: Negative for postnasal drip and sore throat  Eyes: Negative for visual disturbance  Respiratory:        As noted in HPI   Cardiovascular: Negative for chest pain  Gastrointestinal: Negative for abdominal pain, diarrhea and vomiting  Musculoskeletal: Negative for arthralgias  Skin: Negative for rash  Neurological: Negative for headaches  Hematological: Negative for adenopathy  Psychiatric/Behavioral: Negative  All other systems reviewed and are negative  Medical, Family and Social history reviewed and updated as appropriate    Historical Information   Past Medical History:   Diagnosis Date    Arthritis     RIGHT KNEE     [facility] obstructive [facility]     DVT (deep venous thrombosis) [facility] )     Sept 2020    Emphysema of lung ([facility] )     GERD (gastroesophageal reflux disease)     Gestational hypertension     Nerve pain     Pulmonary emphysema (HCC)     Thrombophlebitis     RIGHT LEG      Past Surgical History:   Procedure Laterality Date    CHOLECYSTECTOMY      ENDOVASCULAR LASER THERAPY (EVLT)      Left    SD ENDOVENOUS LASER, 1ST VEIN Left 3/12/2021    Procedure: ENDOVASCULAR LASER THERAPY (EVLT) + Stab Phlebectomy (10-20);   Surgeon: Raheem Iyer MD;  [facility];  Service: Vascular    SD WRIST Chepe Wood LIG Left 5/19/2017    Procedure: ENDOSCOPIC CARPAL TUNNEL RELEASE ;  Surgeon: Ramírez Griggs MD;  [facility];  Service: Orthopedics    TONSILLECTOMY       Family History   Problem Relation Age of Onset    Cancer Father [de-identified]        thinks colon cancer    Heart attack Father 76    Heart attack Mother     Thyroid disease Mother     Atrial fibrillation Mother         pacemaker    Lung cancer Sister         (they share a mother only)     No Known Problems Brother     No Known Problems Daughter     No Known Problems Daughter     No Known Problems Paternal Aunt        Social History     Tobacco Use   Smoking Status Former Smoker    Packs/day: 1 00    Years: 30 00    Pack years: 30 00    Start date: 12/13/2020   Smokeless Tobacco Never Used   Tobacco Comment    last cig 12/22/2020         Meds/Allergies     Current Outpatient Medications:     diclofenac sodium (VOLTAREN) 1 %, Apply 2 g topically 4 (four) times a day, Disp: , Rfl:     ergocalciferol (VITAMIN D2) 50,000 units, Take 1 capsule (50,000 Units total) by mouth once a week for 12 doses, Disp: 12 capsule, Rfl: 0    Multiple Vitamin (MULTI-VITAMIN DAILY PO), Take 1 tablet by mouth daily, Disp: , Rfl:     omeprazole (PriLOSEC) 20 mg delayed release capsule, Take 1 capsule (20 mg total) by mouth daily, Disp: 30 capsule, Rfl: 0    umeclidinium-vilanterol (Anoro Ellipta) 62 5-25 MCG/INH inhaler, Inhale 1 puff daily, Disp: 1 Inhaler, Rfl: 5    ibuprofen (MOTRIN) 600 mg tablet, Take 1 tablet by mouth every 6 (six) hours as needed for mild pain or moderate pain for up to 30 days, Disp: 10 tablet, Rfl: 0  Allergies   Allergen Reactions    Seasonal Ic  [Cholestatin]        Vitals: Blood pressure 140/80, pulse 94, temperature 98 2 °F (36 8 °C), temperature source Temporal, height 5' 7" (1 702 m), weight 70 1 kg (154 lb 9 6 oz), SpO2 98 %  Body mass index is 24 21 kg/m²  Oxygen Therapy  SpO2: 98 %  Oxygen Therapy: None (Room air)    Physical Exam   Physical Exam  Constitutional:       General: She is not in acute distress  HENT:      Head: Normocephalic  Eyes:      General: No scleral icterus  Neck:      Musculoskeletal: Neck supple  Vascular: No JVD  Cardiovascular:      Rate and Rhythm: Normal rate and regular rhythm  Heart sounds: No murmur  Pulmonary:      Breath sounds: No wheezing or rhonchi  Abdominal:      Palpations: Abdomen is soft  Tenderness: There is no abdominal tenderness  Lymphadenopathy:      Cervical: No cervical adenopathy  Skin:     General: Skin is warm and dry  Neurological:      Mental Status: She is alert and oriented to person, place, and time  Psychiatric:         Mood and Affect: Mood normal          Behavior: Behavior normal          Labs: I have personally reviewed pertinent lab results  Lab Results   Component Value Date    WBC 9 28 11/24/2020    HGB 14 5 11/24/2020    HCT 44 8 11/24/2020    MCV 92 11/24/2020     11/24/2020     Lab Results   Component Value Date    GLUCOSE 104 09/28/2015    CALCIUM 9 0 11/24/2020     09/28/2015    K 4 9 11/24/2020    CO2 27 11/24/2020     11/24/2020    BUN 16 11/24/2020    CREATININE 0 65 11/24/2020     No results found for: IGE  Lab Results   Component Value Date    ALT 25 11/24/2020    AST 12 11/24/2020    ALKPHOS 65 11/24/2020    BILITOT 0 26 09/28/2015       Imaging and other studies: I have personally reviewed pertinent reports  and I have personally reviewed pertinent films in PACS CT of the chest from 3/26/21 shows stable biapical scarring and emphysematous changes  There is right lower lobe ground-glass opacity with mild fibrotic changes  There is a stable 10 x 5 mm nodule in the right lower lobe with smooth borders  There are additional small subcentimeter pulmonary nodules, also stable  Pulmonary function testing:  Performed 12/7/20 and personally interpreted  FEV1/FVC ratio 74%   FEV1 71% predicted  FVC 75% predicted  TLC 84 % predicted   % predicted  DLCO corrected for hemoglobin 35 % predicted    Spirometry with mild restrictive impairment, normal lung volumes, reduced diffusion capacity

## 2021-04-20 NOTE — ASSESSMENT & PLAN NOTE
Once we have demonstrates stability of the pulmonary nodules, she will need annual lung cancer screening CT given her prior longstanding history of smoking

## 2021-04-20 NOTE — ASSESSMENT & PLAN NOTE
She has a focal area of fibrosis, likely related to prior infectious or inflammatory insult  She had been very concerned because her sister had  of what sounds like idiopathic pulmonary fibrosis  I reassured her that this pattern is not consistent with the diagnosis, but we will continue to monitor her imaging  Rheumatologic serologies were all unremarkable

## 2021-04-20 NOTE — ASSESSMENT & PLAN NOTE
Chest CT findings are most notable for diffuse emphysematous changes  Fortunately, she has no significant obstruction on PFTs  She has found Anoro Ellipta to be beneficial and we will continue with this inhaler  I have sent refills to her pharmacy

## 2021-04-27 ENCOUNTER — PREP FOR PROCEDURE (OUTPATIENT)
Dept: VASCULAR SURGERY | Facility: CLINIC | Age: 56
End: 2021-04-27

## 2021-04-27 ENCOUNTER — OFFICE VISIT (OUTPATIENT)
Dept: VASCULAR SURGERY | Facility: CLINIC | Age: 56
End: 2021-04-27
Payer: COMMERCIAL

## 2021-04-27 ENCOUNTER — TELEPHONE (OUTPATIENT)
Dept: VASCULAR SURGERY | Facility: CLINIC | Age: 56
End: 2021-04-27

## 2021-04-27 VITALS
HEIGHT: 67 IN | BODY MASS INDEX: 24.48 KG/M2 | DIASTOLIC BLOOD PRESSURE: 82 MMHG | SYSTOLIC BLOOD PRESSURE: 126 MMHG | WEIGHT: 156 LBS

## 2021-04-27 DIAGNOSIS — I83.12 VARICOSE VEINS OF BOTH LOWER EXTREMITIES WITH INFLAMMATION: Primary | ICD-10-CM

## 2021-04-27 DIAGNOSIS — I83.11 VARICOSE VEINS OF BOTH LOWER EXTREMITIES WITH INFLAMMATION: Primary | ICD-10-CM

## 2021-04-27 PROCEDURE — 3008F BODY MASS INDEX DOCD: CPT | Performed by: SURGERY

## 2021-04-27 PROCEDURE — 99215 OFFICE O/P EST HI 40 MIN: CPT | Performed by: SURGERY

## 2021-04-27 PROCEDURE — 1036F TOBACCO NON-USER: CPT | Performed by: SURGERY

## 2021-04-27 RX ORDER — CEFAZOLIN SODIUM 2 G/50ML
2000 SOLUTION INTRAVENOUS ONCE
Status: CANCELLED | OUTPATIENT
Start: 2021-04-27 | End: 2021-04-27

## 2021-04-27 RX ORDER — CHLORHEXIDINE GLUCONATE 0.12 MG/ML
15 RINSE ORAL ONCE
Status: CANCELLED | OUTPATIENT
Start: 2021-04-27 | End: 2021-04-27

## 2021-04-27 NOTE — TELEPHONE ENCOUNTER
REMINDER: Under Reason For Call, comments MUST be formatted as:   (Surgeon's Initials) / (Procedure)    Physician / LENCHO MOORE: Fabiano Smith (NPI: [id number]) Gumaro Mendiola (Tax: [id number] / NPI: [id number])    Procedure: R GSV EVLT + stab phlebectomy 10-20    Level: 4 - Route clearance(s) to [facility] Surgery Coordinator Pool    Equipment / Rep Needs: No    Assistant Surgeon: No    Allergies: Seasonal ic  [cholestatin]    Instructions Given: NO Bowel Prep General Instructions    Blood Thinners / Medication Hold: Patient is not taking any blood thinners  Hydration Required: Patient does not require hydration  Dialysis: Patient is not on dialysis  Consent: I certify that patient has signed, printed, timed, and dated their surgery consent  I certify that BOTH sides of the completed surgery consent have been scanned into the patient's Epic chart by myself on 4/27/2021  Yes, I have LABELED the consent in Epic as Consent for Vascular Procedure  Clearances     Levels   1-3 ROUTE this encounter to [facility] Clearance Pool   AND   SEND Clearance Form(s) to Vascular Nursing e-mail group   Level   4 ROUTE this encounter to [facility] Surgery Coordinator Pool  AND   SEND Clearance Form(s) to Vascular Surgery Schedulers e-mail group     Patient does not require any pre operative clearance  Yes, I have ROUTED this encounter to [facility] Surgery Coordinator and/or [facility] Clearance Pool

## 2021-04-27 NOTE — ASSESSMENT & PLAN NOTE
57yo Female with chronic venous insufficiency bilaterally experiencing pain, swelling, heaviness aching despite conservative management with with compression stocking, rest elevation  She presenst after 3 months with ERICK       LEVDR demonstrates bilateral superficial and deep venous insufficiency  HER GSV are large with significant reflux bilaterally       She is s/p Left GSV EVLT + stab phlebectomy, and presents for 6 wk follow-up  Had an excellent result  Reports significant improvement in pain/swelling  She would like to proceed with Right GSV  EVLT+ stab phlebectomy on the left for which she is an excellent candidate for  Had an extensive risks/benefits alternatives discussion  Including risk of (E-HIT 2%) and the patient consented to proceed       Surgery in upcoming weeks

## 2021-04-27 NOTE — TELEPHONE ENCOUNTER
Is patient requesting a call when authorization has been obtained? Patient would like to be called once an update is available  Surgery Date: 6/18/21  Primary Surgeon: Ivy Glez (NPI: [id number])  Assisting Surgeon: Not Applicable (N/A)  Facility: [facility] (Tax: [id number] / NPI: [id number])  Inpatient / Outpatient: Outpatient  Level: 4    Clearance Received: No clearance ordered  Consent Received: Yes, scanned into Epic on 4/27/21  Medication Hold / Last Dose: Not Applicable (N/A)  VQI Spreadsheet: Not Applicable (N/A)  IR Notified: Not Applicable (N/A)  Rep  Notified: Not Applicable (N/A)  Equipment Needs: Not Applicable (N/A)  Vas Lab Requested: 4/27/21  Patient Contacted: 4/27/21    Diagnosis: I83 11, C17 86  Procedure/ CPT Code(s): (EVLT) Endovenous Laser Treatment WITH Stab Phlebectomies of the right upper leg // CPT: 16543, 65016     For varicose vein related procedures, last LEVDR? Yes, patient's LEVDR was completed within 6-months of their procedure date  Post Operative Date/ Time: 6/22/21 , 1:45pm Blanca Mancuso) with Ivy Glez (NPI: [id number])  Doppler to follow at 2:30pm in the [facility]  S/w pt and scheduled her for her R EVLT w/stab phlebs on 6/18/21 in [facility]   [facility]  She is aware NPO after midnight on 6/17/21

## 2021-04-27 NOTE — PROGRESS NOTES
Assessment/Plan:    Varicose veins of both lower extremities with inflammation  57yo Female with chronic venous insufficiency bilaterally experiencing pain, swelling, heaviness aching despite conservative management with with compression stocking, rest elevation  She presenst after 3 months with LEVDR       LEVDR demonstrates bilateral superficial and deep venous insufficiency  HER GSV are large with significant reflux bilaterally       She is s/p Left GSV EVLT + stab phlebectomy, and presents for 6 wk follow-up  Had an excellent result  Reports significant improvement in pain/swelling  She would like to proceed with Right GSV  EVLT+ stab phlebectomy on the left for which she is an excellent candidate for  Had an extensive risks/benefits alternatives discussion  Including risk of (E-HIT 2%) and the patient consented to proceed  Surgery in upcoming weeks           Problem List Items Addressed This Visit        Cardiovascular and Mediastinum    Varicose veins of both lower extremities with inflammation - Primary     59yo Female with chronic venous insufficiency bilaterally experiencing pain, swelling, heaviness aching despite conservative management with with compression stocking, rest elevation  She presenst after 3 months with LEVDR       LEVDR demonstrates bilateral superficial and deep venous insufficiency  HER GSV are large with significant reflux bilaterally       She is s/p Left GSV EVLT + stab phlebectomy, and presents for 6 wk follow-up  Had an excellent result  Reports significant improvement in pain/swelling  She would like to proceed with Right GSV  EVLT+ stab phlebectomy on the left for which she is an excellent candidate for  Had an extensive risks/benefits alternatives discussion  Including risk of (E-HIT 2%) and the patient consented to proceed       Surgery in upcoming weeks           Relevant Orders    Case request operating room: ENDOVASCULAR LASER THERAPY (EVLT)  R GSV EVLT + Stab Phlebectomy (10-20) (Completed)            Subjective:      Patient ID: Raymond Adler is a 61 y o  female  Patient presents today for 6 week follow up s/p left EVLT with 10 stab phlebectomies done 3/12/21 by Dr Claudine Gusman  Patient has no complaints post op  Patient is a former smoker  The following portions of the patient's history were reviewed and updated as appropriate: allergies, current medications, past family history, past medical history, past social history, past surgical history and problem list     Review of Systems   Constitutional: Negative  HENT: Negative  Eyes: Negative  Respiratory: Negative  Cardiovascular: Negative  Gastrointestinal: Negative  Endocrine: Negative  Genitourinary: Negative  Musculoskeletal: Negative  Skin: Negative  Allergic/Immunologic: Negative  Neurological: Negative  Hematological: Bruises/bleeds easily  Psychiatric/Behavioral: Negative  I have reviewed and updated the ROS as appropriate  Objective:      /82 (BP Location: Right arm, Patient Position: Sitting)   Ht 5' 7" (1 702 m)   Wt 70 8 kg (156 lb)   BMI 24 43 kg/m²          Physical Exam  Constitutional:       Appearance: Normal appearance  HENT:      Head: Normocephalic and atraumatic  Eyes:      Extraocular Movements: Extraocular movements intact  Pupils: Pupils are equal, round, and reactive to light  Cardiovascular:      Rate and Rhythm: Normal rate and regular rhythm  Pulses: Normal pulses  Heart sounds: Normal heart sounds  Pulmonary:      Effort: Pulmonary effort is normal       Breath sounds: Normal breath sounds  Abdominal:      General: Abdomen is flat  Palpations: Abdomen is soft  Musculoskeletal: Normal range of motion  Right lower leg: No edema  Left lower leg: Edema present  Comments: LLE with well healed stab sites  Swelling significantly improved  No significant remnant varicose veins       RLE with varicose veins in the calf with 1+ edema    Skin:     General: Skin is warm and dry  Capillary Refill: Capillary refill takes less than 2 seconds  Neurological:      General: No focal deficit present  Mental Status: She is alert and oriented to person, place, and time  Psychiatric:         Mood and Affect: Mood normal          Behavior: Behavior normal          Thought Content: Thought content normal          Judgment: Judgment normal                EVLT Operative Scheduling Information:    Hospital[facility]  [facility]    Physician:  Me    Surgery: R GSV EVLT + stab phlebectomy 10-20    Urgency:  Standard    Level:  Level 4: Outpatients to be scheduled for screening procedures and elective surgery that can be delayed for longer than one month without reasonable expectation of detriment to patient  Case Length:  Normal    Post-op Bed:  Outpatient    OR Table:  Standard    Equipment Needs:  None    Medication Instructions:  None    Hydration:  No    Venous Clinical Severity Scores (VCSS)  Item Absent   (0 points) Mild   (1 point) Moderate   (2 points) Severe   (3 points)   Pain [] None [] Occasional [x] Daily [] Daily limiting   Varicose veins [] None [] Few [x] Calf or thigh [] Calf and thigh   Venous edema [] None [x] Foot and ankle [] Above ankle, below knee [] To knee of above   Skin pigmentation [x] None [] Perimalleolar [] Diffuse, lower 1/3 calf [] Wider, above lower 1/3 calf   Inflammation [x] None [] Perimalleolar [] Diffuse, lower 1/3 calf [] Wider, above lower 1/3 calf   Induration [x] None [] Perimalleolar [] Diffuse, lower 1/3 calf [] Wider, above lower 1/3 calf   No  active ulcers [x] None [] 1 [] 2 [] ? 3   Active ulcer size [x] None [] <2 cm [] 2 - 6 cm [] >6 cm   Ulcer duration [x] None [] <3 months [] 3 - 12 months [] >1 year   Compression therapy [] None [] Intermittent [] Most days [x] Fully comply   Total           CEAP Clinical Classification  [x] Symptomatic   [] Asymptomatic     [] Class 0 No visible or palpable signs of venous disease   [] Class 1 Telangiectasies or reticular veins   [] Class 2 Varicose veins; distinguished from reticular veins by a diameter of 3mm or more   [x] Class 3 Edema   [] Class 4 Changes in skin and subcutaneous tissue secondary to CVD    [] Class 4a Pigmentation or eczema   [] Class 4b Lipodermatosclerosis or atrophie john   [] Class 5 Healed venous ulcer   [] Class 6 Active venous ulcer

## 2021-05-03 NOTE — TELEPHONE ENCOUNTER
Authorization requirements reviewed  Please refer to Debbie Messer / Noni Valenzuela number [id number] for case updates

## 2021-05-06 ENCOUNTER — PATIENT MESSAGE (OUTPATIENT)
Dept: FAMILY MEDICINE CLINIC | Facility: CLINIC | Age: 56
End: 2021-05-06

## 2021-05-06 DIAGNOSIS — K21.9 GASTROESOPHAGEAL REFLUX DISEASE WITHOUT ESOPHAGITIS: ICD-10-CM

## 2021-05-06 DIAGNOSIS — K21.9 GASTROESOPHAGEAL REFLUX DISEASE WITHOUT ESOPHAGITIS: Primary | ICD-10-CM

## 2021-05-06 RX ORDER — OMEPRAZOLE 20 MG/1
20 CAPSULE, DELAYED RELEASE ORAL DAILY
Qty: 30 CAPSULE | Refills: 2 | Status: SHIPPED | OUTPATIENT
Start: 2021-05-06

## 2021-05-06 NOTE — TELEPHONE ENCOUNTER
From: Octavia Espinoza  To: Sarah Pollack DO  Sent: 5/6/2021 2:09 PM EDT  Subject: Prescription Question    Hi, I received this message on 4/16 from Dr Nisa Carmona---  Your results are back for the blood work  Your vitamin D is still low  I will send in another round of prescription vitamin D for you  After 3 months of taking this you can then start over the counter vitamin D 2,000 iu daily  I checked with the pharmacy last week and they don't have a prescription for me  Could it be resubmitted please?  Thank you

## 2021-05-06 NOTE — TELEPHONE ENCOUNTER
Refill was sent on 4/22/21  Placed call to [facility], they have the refill on file and will fill for th patient

## 2021-05-06 NOTE — TELEPHONE ENCOUNTER
Gage Lewis,    Can you please let the patient know that I will refill the omeprazole for three more months; however, per Dr Rony Costello note from her visit in February, she should see a gastroenterologist for continued use/evalution  I have placed a referral  Thank you      OC Calvin

## 2021-05-13 ENCOUNTER — TELEPHONE (OUTPATIENT)
Dept: PULMONOLOGY | Facility: CLINIC | Age: 56
End: 2021-05-13

## 2021-05-13 NOTE — TELEPHONE ENCOUNTER
Left msg for pt to sched oct appt  At [facility] with dr Yuly Selby

## 2021-05-18 ENCOUNTER — IMMUNIZATIONS (OUTPATIENT)
Dept: FAMILY MEDICINE CLINIC | Facility: HOSPITAL | Age: 56
End: 2021-05-18

## 2021-05-18 DIAGNOSIS — Z23 ENCOUNTER FOR IMMUNIZATION: Primary | ICD-10-CM

## 2021-05-18 PROCEDURE — 91300 SARS-COV-2 / COVID-19 MRNA VACCINE (PFIZER-BIONTECH) 30 MCG: CPT

## 2021-05-18 PROCEDURE — 0001A SARS-COV-2 / COVID-19 MRNA VACCINE (PFIZER-BIONTECH) 30 MCG: CPT

## 2021-06-03 DIAGNOSIS — K21.9 GASTROESOPHAGEAL REFLUX DISEASE WITHOUT ESOPHAGITIS: ICD-10-CM

## 2021-06-03 DIAGNOSIS — J43.2 CENTRILOBULAR EMPHYSEMA (HCC): ICD-10-CM

## 2021-06-03 RX ORDER — UMECLIDINIUM BROMIDE AND VILANTEROL TRIFENATATE 62.5; 25 UG/1; UG/1
1 POWDER RESPIRATORY (INHALATION) DAILY
Refills: 0 | Status: CANCELLED | OUTPATIENT
Start: 2021-06-03

## 2021-06-03 RX ORDER — OMEPRAZOLE 20 MG/1
20 CAPSULE, DELAYED RELEASE ORAL DAILY
Qty: 30 CAPSULE | Refills: 0 | Status: CANCELLED | OUTPATIENT
Start: 2021-06-03

## 2021-06-04 ENCOUNTER — ANESTHESIA EVENT (OUTPATIENT)
Dept: PERIOP | Facility: AMBULARY SURGERY CENTER | Age: 56
End: 2021-06-04
Payer: COMMERCIAL

## 2021-06-12 ENCOUNTER — IMMUNIZATIONS (OUTPATIENT)
Dept: FAMILY MEDICINE CLINIC | Facility: HOSPITAL | Age: 56
End: 2021-06-12

## 2021-06-12 DIAGNOSIS — Z23 ENCOUNTER FOR IMMUNIZATION: Primary | ICD-10-CM

## 2021-06-12 PROCEDURE — 0002A SARS-COV-2 / COVID-19 MRNA VACCINE (PFIZER-BIONTECH) 30 MCG: CPT

## 2021-06-12 PROCEDURE — 91300 SARS-COV-2 / COVID-19 MRNA VACCINE (PFIZER-BIONTECH) 30 MCG: CPT

## 2021-06-17 NOTE — PRE-PROCEDURE INSTRUCTIONS
Pre-Surgery Instructions:   Medication Instructions    diclofenac sodium (VOLTAREN) 1 % Instructed to take per normal schedule except DOS    ergocalciferol (VITAMIN D2) 50,000 units Instructed to avoid all ASA/NSAIDs and OTC Vit/Supp from now until after surgery  Tylenol ok prn    ibuprofen (MOTRIN) 600 mg tablet Instructed to avoid all ASA/NSAIDs and OTC Vit/Supp from now until after surgery  Tylenol ok prn    Multiple Vitamin (MULTI-VITAMIN DAILY PO) Instructed to avoid all ASA/NSAIDs and OTC Vit/Supp from now until after surgery  Tylenol ok prn    omeprazole (PriLOSEC) 20 mg delayed release capsule Instructed to take per normal schedule including DOS with sips water    umeclidinium-vilanterol (Anoro Ellipta) 62 5-25 MCG/INH inhaler Instructed to take per normal schedule including DOS   Pre op,medications and showering instructions reviewed-Patient has hibiclens  Instructed to avoid all ASA/NSAIDs and OTC Vit/Supp from now until after surgery  Tylenol ok prn   Pt  Verbalized an understanding of all instructions reviewed and offers no concerns at this time

## 2021-06-18 ENCOUNTER — HOSPITAL ENCOUNTER (OUTPATIENT)
Dept: ULTRASOUND IMAGING | Facility: AMBULARY SURGERY CENTER | Age: 56
Discharge: HOME | End: 2021-06-18
Payer: COMMERCIAL

## 2021-06-18 ENCOUNTER — HOSPITAL ENCOUNTER (OUTPATIENT)
Facility: AMBULARY SURGERY CENTER | Age: 56
Setting detail: OUTPATIENT SURGERY
Discharge: HOME | End: 2021-06-18
Attending: SURGERY | Admitting: SURGERY
Payer: COMMERCIAL

## 2021-06-18 ENCOUNTER — ANESTHESIA (OUTPATIENT)
Dept: PERIOP | Facility: AMBULARY SURGERY CENTER | Age: 56
End: 2021-06-18
Payer: COMMERCIAL

## 2021-06-18 VITALS
RESPIRATION RATE: 16 BRPM | DIASTOLIC BLOOD PRESSURE: 80 MMHG | OXYGEN SATURATION: 96 % | BODY MASS INDEX: 24.33 KG/M2 | HEART RATE: 69 BPM | WEIGHT: 155 LBS | SYSTOLIC BLOOD PRESSURE: 167 MMHG | HEIGHT: 67 IN | TEMPERATURE: 97.3 F

## 2021-06-18 DIAGNOSIS — I83.899 VARICOSE VEINS OF LOWER EXTREMITIES WITH COMPLICATIONS: ICD-10-CM

## 2021-06-18 PROCEDURE — NC001 PR NO CHARGE: Performed by: SURGERY

## 2021-06-18 PROCEDURE — 93971 EXTREMITY STUDY: CPT

## 2021-06-18 PROCEDURE — 99024 POSTOP FOLLOW-UP VISIT: CPT | Performed by: SURGERY

## 2021-06-18 PROCEDURE — 37765 STAB PHLEB VEINS XTR 10-20: CPT | Performed by: SURGERY

## 2021-06-18 PROCEDURE — 36478 ENDOVENOUS LASER 1ST VEIN: CPT | Performed by: SURGERY

## 2021-06-18 RX ORDER — ONDANSETRON 2 MG/ML
INJECTION INTRAMUSCULAR; INTRAVENOUS AS NEEDED
Status: DISCONTINUED | OUTPATIENT
Start: 2021-06-18 | End: 2021-06-18

## 2021-06-18 RX ORDER — MIDAZOLAM HYDROCHLORIDE 2 MG/2ML
INJECTION, SOLUTION INTRAMUSCULAR; INTRAVENOUS AS NEEDED
Status: DISCONTINUED | OUTPATIENT
Start: 2021-06-18 | End: 2021-06-18

## 2021-06-18 RX ORDER — SODIUM CHLORIDE, SODIUM LACTATE, POTASSIUM CHLORIDE, CALCIUM CHLORIDE 600; 310; 30; 20 MG/100ML; MG/100ML; MG/100ML; MG/100ML
100 INJECTION, SOLUTION INTRAVENOUS CONTINUOUS
Status: DISCONTINUED | OUTPATIENT
Start: 2021-06-18 | End: 2021-06-18 | Stop reason: HOSPADM

## 2021-06-18 RX ORDER — ONDANSETRON 2 MG/ML
4 INJECTION INTRAMUSCULAR; INTRAVENOUS ONCE AS NEEDED
Status: COMPLETED | OUTPATIENT
Start: 2021-06-18 | End: 2021-06-18

## 2021-06-18 RX ORDER — FENTANYL CITRATE/PF 50 MCG/ML
50 SYRINGE (ML) INJECTION ONCE AS NEEDED
Status: DISCONTINUED | OUTPATIENT
Start: 2021-06-18 | End: 2021-06-18 | Stop reason: HOSPADM

## 2021-06-18 RX ORDER — ACETAMINOPHEN 325 MG/1
650 TABLET ORAL EVERY 4 HOURS PRN
Status: DISCONTINUED | OUTPATIENT
Start: 2021-06-18 | End: 2021-06-18 | Stop reason: HOSPADM

## 2021-06-18 RX ORDER — LIDOCAINE HYDROCHLORIDE 10 MG/ML
INJECTION, SOLUTION EPIDURAL; INFILTRATION; INTRACAUDAL; PERINEURAL AS NEEDED
Status: DISCONTINUED | OUTPATIENT
Start: 2021-06-18 | End: 2021-06-18

## 2021-06-18 RX ORDER — CEFAZOLIN SODIUM 2 G/50ML
2000 SOLUTION INTRAVENOUS ONCE
Status: COMPLETED | OUTPATIENT
Start: 2021-06-18 | End: 2021-06-18

## 2021-06-18 RX ORDER — SODIUM CHLORIDE, SODIUM LACTATE, POTASSIUM CHLORIDE, CALCIUM CHLORIDE 600; 310; 30; 20 MG/100ML; MG/100ML; MG/100ML; MG/100ML
INJECTION, SOLUTION INTRAVENOUS CONTINUOUS PRN
Status: DISCONTINUED | OUTPATIENT
Start: 2021-06-18 | End: 2021-06-18

## 2021-06-18 RX ORDER — LABETALOL 20 MG/4 ML (5 MG/ML) INTRAVENOUS SYRINGE
5 ONCE AS NEEDED
Status: DISCONTINUED | OUTPATIENT
Start: 2021-06-18 | End: 2021-06-18 | Stop reason: HOSPADM

## 2021-06-18 RX ORDER — HYDROMORPHONE HCL/PF 1 MG/ML
0.5 SYRINGE (ML) INJECTION
Status: ACTIVE | OUTPATIENT
Start: 2021-06-18 | End: 2021-06-18

## 2021-06-18 RX ORDER — ONDANSETRON 2 MG/ML
4 INJECTION INTRAMUSCULAR; INTRAVENOUS EVERY 4 HOURS PRN
Status: DISCONTINUED | OUTPATIENT
Start: 2021-06-18 | End: 2021-06-18 | Stop reason: HOSPADM

## 2021-06-18 RX ORDER — FENTANYL CITRATE 50 UG/ML
INJECTION, SOLUTION INTRAMUSCULAR; INTRAVENOUS AS NEEDED
Status: DISCONTINUED | OUTPATIENT
Start: 2021-06-18 | End: 2021-06-18

## 2021-06-18 RX ORDER — ALBUTEROL SULFATE 2.5 MG/3ML
2.5 SOLUTION RESPIRATORY (INHALATION) ONCE AS NEEDED
Status: DISCONTINUED | OUTPATIENT
Start: 2021-06-18 | End: 2021-06-18 | Stop reason: HOSPADM

## 2021-06-18 RX ORDER — PROPOFOL 10 MG/ML
INJECTION, EMULSION INTRAVENOUS AS NEEDED
Status: DISCONTINUED | OUTPATIENT
Start: 2021-06-18 | End: 2021-06-18

## 2021-06-18 RX ORDER — MEPERIDINE HYDROCHLORIDE 25 MG/ML
12.5 INJECTION INTRAMUSCULAR; INTRAVENOUS; SUBCUTANEOUS ONCE AS NEEDED
Status: DISCONTINUED | OUTPATIENT
Start: 2021-06-18 | End: 2021-06-18 | Stop reason: HOSPADM

## 2021-06-18 RX ORDER — KETOROLAC TROMETHAMINE 30 MG/ML
INJECTION, SOLUTION INTRAMUSCULAR; INTRAVENOUS AS NEEDED
Status: DISCONTINUED | OUTPATIENT
Start: 2021-06-18 | End: 2021-06-18

## 2021-06-18 RX ORDER — DEXAMETHASONE SODIUM PHOSPHATE 4 MG/ML
INJECTION, SOLUTION INTRA-ARTICULAR; INTRALESIONAL; INTRAMUSCULAR; INTRAVENOUS; SOFT TISSUE AS NEEDED
Status: DISCONTINUED | OUTPATIENT
Start: 2021-06-18 | End: 2021-06-18

## 2021-06-18 RX ORDER — OXYCODONE HYDROCHLORIDE 5 MG/1
5 TABLET ORAL EVERY 4 HOURS PRN
Status: DISCONTINUED | OUTPATIENT
Start: 2021-06-18 | End: 2021-06-18 | Stop reason: HOSPADM

## 2021-06-18 RX ADMIN — FENTANYL CITRATE 50 MCG: 50 INJECTION, SOLUTION INTRAMUSCULAR; INTRAVENOUS at 09:27

## 2021-06-18 RX ADMIN — FENTANYL CITRATE 50 MCG: 50 INJECTION, SOLUTION INTRAMUSCULAR; INTRAVENOUS at 08:51

## 2021-06-18 RX ADMIN — SODIUM CHLORIDE, SODIUM LACTATE, POTASSIUM CHLORIDE, AND CALCIUM CHLORIDE: .6; .31; .03; .02 INJECTION, SOLUTION INTRAVENOUS at 08:07

## 2021-06-18 RX ADMIN — DEXAMETHASONE SODIUM PHOSPHATE 4 MG: 4 INJECTION INTRA-ARTICULAR; INTRALESIONAL; INTRAMUSCULAR; INTRAVENOUS; SOFT TISSUE at 08:56

## 2021-06-18 RX ADMIN — ONDANSETRON 4 MG: 2 INJECTION INTRAMUSCULAR; INTRAVENOUS at 10:25

## 2021-06-18 RX ADMIN — LIDOCAINE HYDROCHLORIDE 50 MG: 10 INJECTION, SOLUTION EPIDURAL; INFILTRATION; INTRACAUDAL at 08:51

## 2021-06-18 RX ADMIN — KETOROLAC TROMETHAMINE 30 MG: 30 INJECTION, SOLUTION INTRAMUSCULAR at 09:29

## 2021-06-18 RX ADMIN — MIDAZOLAM HYDROCHLORIDE 2 MG: 1 INJECTION, SOLUTION INTRAMUSCULAR; INTRAVENOUS at 08:47

## 2021-06-18 RX ADMIN — CEFAZOLIN SODIUM 2000 MG: 2 SOLUTION INTRAVENOUS at 08:47

## 2021-06-18 RX ADMIN — ONDANSETRON 4 MG: 2 INJECTION INTRAMUSCULAR; INTRAVENOUS at 08:56

## 2021-06-18 RX ADMIN — PROPOFOL 200 MG: 10 INJECTION, EMULSION INTRAVENOUS at 08:51

## 2021-06-18 NOTE — INTERVAL H&P NOTE
H&P reviewed  After examining the patient I find no changes in the patients condition since the H&P had been written      Vitals:    06/18/21 0804   BP: 142/80   Pulse: 88   Resp: 18   Temp: (!) 97 2 °F (36 2 °C)   SpO2: 95%     Plan: R GSV EVLT + stab phlebectomy

## 2021-06-18 NOTE — OP NOTE
OPERATIVE REPORT  PATIENT NAME: Radha Jordan    :  1965  MRN: [id number]  Pt Location: [facility] OR [facility]OM 05    SURGERY DATE: 2021    Surgeon(s) and Role:     Aleena Hurd MD - Primary    Preop Diagnosis:  Varicose veins of both lower extremities with inflammation [I83 11, I83 12]    Post-Op Diagnosis Codes: * Varicose veins of both lower extremities with inflammation [I83 11, I83 12]    Procedure(s) (LRB):  ENDOVASCULAR LASER THERAPY (EVLT) R GSV EVLT + Stab Phlebectomy (10-20) (Right)    Specimen(s):  * No specimens in log *    Estimated Blood Loss:   Minimal    Drains:  * No LDAs found *    Anesthesia Type:   General    Operative Indications:  Varicose veins of both lower extremities with inflammation [I83 11, I83 12]      Operative Findings:  none    Complications:   None    Procedure and Technique:  Procedure and Technique:    The patient Radha Jordan was identified in the operating room after adequate laryngeal mask anesthesia was obtained the Right  leg was prepped and draped using Betadine prep and Sterile drapes  Under ultrasound guidance saphenous vein was identified, micropuncture needle wire and catheter were placed, J-wire to the saphenofemoral junction followed by the laser sheath  The laser was engaged at 9 W of power for proximally 129 seconds for 901J delivered  Completion duplex imaging showed wide patency of the common femoral vein with no evidence of deep vein thrombosis  The saphenous vein was obliterated along its course  Multiple stab phebectomies 20 were performed, varicosities were excised  Quarter-inch Steri-Strips were placed and a sterile compressive dressing was applied Estimated blood loss was minimal  The patient was taken to the recovery room in stable condition    A qualified resident physician was not available     I was present for the entire procedure    Patient Disposition:  PACU     SIGNATURE: Dae Simmons MD  DATE: 2021  TIME: 10:05 AM

## 2021-06-18 NOTE — H&P
Assessment/Plan:     Varicose veins of both lower extremities with inflammation  55yo Female with chronic venous insufficiency bilaterally experiencing pain, swelling, heaviness aching despite conservative management with with compression stocking, rest elevation  She presenst after 3 months with LEVDR       LEVDR demonstrates bilateral superficial and deep venous insufficiency  HER GSV are large with significant reflux bilaterally       She is s/p Left GSV EVLT + stab phlebectomy, and presents for 6 wk follow-up  Had an excellent result  Reports significant improvement in pain/swelling       She would like to proceed with Right GSV  EVLT+ stab phlebectomy on the left for which she is an excellent candidate for  Had an extensive risks/benefits alternatives discussion  Including risk of (E-HIT 2%) and the patient consented to proceed       Surgery in upcoming weeks                Problem List Items Addressed This Visit                 Cardiovascular and Mediastinum      Varicose veins of both lower extremities with inflammation - Primary        59yo Female with chronic venous insufficiency bilaterally experiencing pain, swelling, heaviness aching despite conservative management with with compression stocking, rest elevation  She presenst after 3 months with LEVDR       LEVDR demonstrates bilateral superficial and deep venous insufficiency  HER GSV are large with significant reflux bilaterally       She is s/p Left GSV EVLT + stab phlebectomy, and presents for 6 wk follow-up  Had an excellent result  Reports significant improvement in pain/swelling       She would like to proceed with Right GSV  EVLT+ stab phlebectomy on the left for which she is an excellent candidate for   Had an extensive risks/benefits alternatives discussion  Including risk of (E-HIT 2%) and the patient consented to proceed       Surgery in upcoming weeks               Relevant Orders      Case request operating room: ENDOVASCULAR LASER THERAPY (EVLT)  R GSV EVLT + Stab Phlebectomy (10-20) (Completed)               Subjective:       Patient ID: Master Wu is a 61 y o  female  Patient presents today for 6 week follow up s/p left EVLT with 10 stab phlebectomies done 3/12/21 by Dr Gal Kirk  Patient has no complaints post op  Patient is a former smoker          The following portions of the patient's history were reviewed and updated as appropriate: allergies, current medications, past family history, past medical history, past social history, past surgical history and problem list      Review of Systems   Constitutional: Negative  HENT: Negative  Eyes: Negative  Respiratory: Negative  Cardiovascular: Negative  Gastrointestinal: Negative  Endocrine: Negative  Genitourinary: Negative  Musculoskeletal: Negative  Skin: Negative  Allergic/Immunologic: Negative  Neurological: Negative  Hematological: Bruises/bleeds easily  Psychiatric/Behavioral: Negative  I have reviewed and updated the ROS as appropriate           Objective:        /82 (BP Location: Right arm, Patient Position: Sitting)   Ht 5' 7" (1 702 m)   Wt 70 8 kg (156 lb)   BMI 24 43 kg/m²             Physical Exam  Constitutional:       Appearance: Normal appearance  HENT:      Head: Normocephalic and atraumatic  Eyes:      Extraocular Movements: Extraocular movements intact  Pupils: Pupils are equal, round, and reactive to light  Cardiovascular:      Rate and Rhythm: Normal rate and regular rhythm  Pulses: Normal pulses  Heart sounds: Normal heart sounds  Pulmonary:      Effort: Pulmonary effort is normal       Breath sounds: Normal breath sounds  Abdominal:      General: Abdomen is flat  Palpations: Abdomen is soft  Musculoskeletal: Normal range of motion  Right lower leg: No edema  Left lower leg: Edema present  Comments: LLE with well healed stab sites  Swelling significantly improved   No significant remnant varicose veins  RLE with varicose veins in the calf with 1+ edema    Skin:     General: Skin is warm and dry  Capillary Refill: Capillary refill takes less than 2 seconds  Neurological:      General: No focal deficit present  Mental Status: She is alert and oriented to person, place, and time  Psychiatric:         Mood and Affect: Mood normal          Behavior: Behavior normal          Thought Content:  Thought content normal          Judgment: Judgment normal

## 2021-06-18 NOTE — DISCHARGE INSTRUCTIONS
Endovenous Ablation   WHAT YOU NEED TO KNOW:   Endovenous ablation is a procedure that uses radiofrequency or laser energy to treat varicose veins  Varicose veins are large, twisted veins in your legs that bulge out under your skin  Endovenous ablation may help treat pain, discolored skin, or ulcers in your leg that are caused by varicose veins  DISCHARGE INSTRUCTIONS:   Call 911 for any of the following:   · You feel lightheaded, short of breath, and have chest pain  · You cough up blood  · You have trouble breathing  Seek care immediately if:   · Blood soaks through your bandage  · Your leg feels warm, tender and painful  · Any part of your leg is numb or pale to the touch  Contact your healthcare provider if:   · You have a fever or chills  · Your wound is red, swollen, or draining pus  · You have nausea or vomiting  · Your skin is itchy, swollen, or you have a rash  · You have questions or concerns about your condition or care  Medicines: You may need any of the following:  · Prescription pain medicine  may be given  Ask your healthcare provider how to take this medicine safely  Some prescription pain medicines contain acetaminophen  Do not take other medicines that contain acetaminophen without talking to your healthcare provider  Too much acetaminophen may cause liver damage  Prescription pain medicine may cause constipation  Ask your healthcare provider how to prevent or treat constipation  · NSAIDs , such as ibuprofen, help decrease swelling, pain, and fever  NSAIDs can cause stomach bleeding or kidney problems in certain people  If you take blood thinner medicine, always ask your healthcare provider if NSAIDs are safe for you  Always read the medicine label and follow directions  · Take your medicine as directed  Contact your healthcare provider if you think your medicine is not helping or if you have side effects   Tell him or her if you are allergic to any medicine  Keep a list of the medicines, vitamins, and herbs you take  Include the amounts, and when and why you take them  Bring the list or the pill bottles to follow-up visits  Carry your medicine list with you in case of an emergency  Care for your wound as directed:  Carefully wash the wound with soap and water  Dry the area and put on new, clean bandages as directed  Change your bandages when they get wet or dirty  Check your incision for signs of infection such as redness, swelling, or pus  Self-care:   · Stay active  Do not spend too much time sitting or standing  Take short walks throughout the day  Walking will improve blood flow and prevent blood clots in your leg  Ask your healthcare provider how much activity you need to do every day  · Wear compression stockings or bandages as directed  The stockings and bandages are snug and put pressure on your legs  This improves blood flow and helps prevent clots  · Apply a warm compress as directed  A warm compress can be a small towel or washcloth  Moisten it in warm (not hot) water  Apply the warm compress to your leg  A warm compress may help with discomfort from injury to your vein during the procedure  · Elevate your leg  above the level of your heart when you are not moving  This will help decrease swelling and pain  Prop your leg on pillows or blankets to keep it elevated comfortably  Follow up with your healthcare provider as directed:  Write down your questions so you remember to ask them during your visits  © Copyright [address] Information is for End User's use only and may not be sold, redistributed or otherwise used for commercial purposes  All illustrations and images included in CareNotes® are the copyrighted property of [employer]  or [facility] Bryanna Ariza   The above information is an  only  It is not intended as medical advice for individual conditions or treatments   Talk to your doctor, nurse or pharmacist before following any medical regimen to see if it is safe and effective for you

## 2021-06-18 NOTE — ANESTHESIA PREPROCEDURE EVALUATION
Procedure:  ENDOVASCULAR LASER THERAPY (EVLT) R GSV EVLT + Stab Phlebectomy (10-20) (Right Leg Upper)    Relevant Problems   GI/HEPATIC   (+) Gastroesophageal reflux disease without esophagitis      MUSCULOSKELETAL   (+) Patellofemoral arthritis of left knee      PULMONARY   (+) Centrilobular emphysema (HCC)        Physical Exam    Airway    Mallampati score: II  TM Distance: >3 FB  Neck ROM: full     Dental       Cardiovascular  Rhythm: regular, Rate: normal,     Pulmonary  Breath sounds clear to auscultation,     Other Findings        Anesthesia Plan  ASA Score- 3     Anesthesia Type- general with ASA Monitors  Additional Monitors:   Airway Plan: LMA  Plan Factors-Exercise tolerance (METS): >4 METS  Chart reviewed  Existing labs reviewed  Patient summary reviewed  Induction- intravenous  Postoperative Plan-   Planned trial extubation    Informed Consent- Anesthetic plan and risks discussed with patient  I personally reviewed this patient with the CRNA  Discussed and agreed on the Anesthesia Plan with the CRNA  Alvaro Jo

## 2021-06-18 NOTE — ANESTHESIA POSTPROCEDURE EVALUATION
Post-Op Assessment Note    CV Status:  Stable  Pain Score: 0    Pain management: adequate     Mental Status:  Alert and awake   Hydration Status:  Euvolemic   PONV Controlled:  Controlled   Airway Patency:  Patent      Post Op Vitals Reviewed: Yes      Staff: Anesthesiologist, CRNA         No complications documented      BP   194/79   Temp  97 6   Pulse  78   Resp   16   SpO2   100%

## 2021-06-22 ENCOUNTER — HOSPITAL ENCOUNTER (OUTPATIENT)
Dept: NON INVASIVE DIAGNOSTICS | Facility: CLINIC | Age: 56
Discharge: HOME | End: 2021-06-22
Payer: COMMERCIAL

## 2021-06-22 ENCOUNTER — TELEPHONE (OUTPATIENT)
Dept: VASCULAR SURGERY | Facility: CLINIC | Age: 56
End: 2021-06-22

## 2021-06-22 ENCOUNTER — OFFICE VISIT (OUTPATIENT)
Dept: VASCULAR SURGERY | Facility: CLINIC | Age: 56
End: 2021-06-22

## 2021-06-22 VITALS
BODY MASS INDEX: 24.33 KG/M2 | SYSTOLIC BLOOD PRESSURE: 178 MMHG | WEIGHT: 155 LBS | DIASTOLIC BLOOD PRESSURE: 96 MMHG | HEIGHT: 67 IN | HEART RATE: 86 BPM | TEMPERATURE: 99.3 F

## 2021-06-22 DIAGNOSIS — I80.01 THROMBOPHLEBITIS OF SAPHENOUS VEIN, RIGHT: ICD-10-CM

## 2021-06-22 DIAGNOSIS — I83.12 VARICOSE VEINS OF BOTH LOWER EXTREMITIES WITH INFLAMMATION: ICD-10-CM

## 2021-06-22 DIAGNOSIS — I83.12 VARICOSE VEINS OF BOTH LOWER EXTREMITIES WITH INFLAMMATION: Primary | ICD-10-CM

## 2021-06-22 DIAGNOSIS — I83.11 VARICOSE VEINS OF BOTH LOWER EXTREMITIES WITH INFLAMMATION: ICD-10-CM

## 2021-06-22 DIAGNOSIS — I83.11 VARICOSE VEINS OF BOTH LOWER EXTREMITIES WITH INFLAMMATION: Primary | ICD-10-CM

## 2021-06-22 PROCEDURE — 93971 EXTREMITY STUDY: CPT

## 2021-06-22 PROCEDURE — 99024 POSTOP FOLLOW-UP VISIT: CPT | Performed by: SURGERY

## 2021-06-22 PROCEDURE — 93971 EXTREMITY STUDY: CPT | Performed by: SURGERY

## 2021-06-22 NOTE — ASSESSMENT & PLAN NOTE
Post-op RLE GSV EVLT + stab phlebectomy  Doing well  Incisions c/d/i  Will obtain post-procedure ultrasound, will notify of results  F/u in 4-6 weeks

## 2021-06-22 NOTE — TELEPHONE ENCOUNTER
Patient s/p R EVLT with Dr Krishna Mckeon 6/18/21 and is scheduled for post-op visit today at [phone] with Dr Krishna Mckeon  Patient calls office to report she just fell down her back steps at house landed on affected leg  She believes she only twisted her ankle and sees it is starting to swell in that area  She is not in any significant pain other than being sore from surgery, and there is no bleeding at this time  She is asking if she should report to ED or wait to see Dr Krishna Mckeon in another hour  Advised patient it is up to how she feels  If she feels she can wait until he can evaluate her at [phone], to do that, however, if she develops any bleeding, significant swelling, severe pain, or any other acute symptoms prior to appt, she was advised to go to ED  Patient verbalized understanding and agrees to plan

## 2021-06-22 NOTE — PROGRESS NOTES
Assessment/Plan:    Varicose veins of both lower extremities with inflammation  Post-op RLE GSV EVLT + stab phlebectomy  Doing well  Incisions c/d/i  Will obtain post-procedure ultrasound, will notify of results  F/u in 4-6 weeks  Problem List Items Addressed This Visit        Cardiovascular and Mediastinum    Varicose veins of both lower extremities with inflammation - Primary     Post-op RLE GSV EVLT + stab phlebectomy  Doing well  Incisions c/d/i  Will obtain post-procedure ultrasound, will notify of results  F/u in 4-6 weeks  Subjective:      Patient ID: Duarte Rios is a 61 y o  female  Patient is s/p R GSV EVLT and stabs on 6/18, presents today for post-op visit  Patient denies pain in R leg  No complaints/concern at this time  The following portions of the patient's history were reviewed and updated as appropriate: allergies, current medications, past family history, past medical history, past social history, past surgical history and problem list     Review of Systems   Constitutional: Negative  HENT: Negative  Eyes: Negative  Respiratory: Negative  Cardiovascular: Negative  Gastrointestinal: Negative  Endocrine: Negative  Genitourinary: Negative  Musculoskeletal: Negative  Skin: Negative  Allergic/Immunologic: Negative  Neurological: Negative  Hematological: Negative  Psychiatric/Behavioral: Negative  I have reviewed and updated the ROS as appropriate  Objective:      BP (!) 178/96 (BP Location: Right arm, Patient Position: Sitting)   Pulse 86   Temp 99 3 °F (37 4 °C) (Tympanic)   Ht 5' 7" (1 702 m)   Wt 70 3 kg (155 lb)   BMI 24 28 kg/m²          Physical Exam  Constitutional:       Appearance: Normal appearance  HENT:      Head: Normocephalic and atraumatic  Nose: Nose normal    Eyes:      Extraocular Movements: Extraocular movements intact        Pupils: Pupils are equal, round, and reactive to light  Cardiovascular:      Rate and Rhythm: Normal rate and regular rhythm  Pulses: Normal pulses  Heart sounds: Normal heart sounds  Pulmonary:      Effort: Pulmonary effort is normal       Breath sounds: Normal breath sounds  Abdominal:      General: Abdomen is flat  Palpations: Abdomen is soft  Musculoskeletal:         General: Normal range of motion  Right lower leg: Edema present  Comments: Typical post EVLT + stab phlebectomy findings  Swelling in the lateral ankle, patien tfell 2 hours before arrival to clinic on that ankle    Skin:     General: Skin is warm and dry  Capillary Refill: Capillary refill takes less than 2 seconds  Neurological:      General: No focal deficit present  Mental Status: She is alert and oriented to person, place, and time  Psychiatric:         Mood and Affect: Mood normal          Behavior: Behavior normal          Thought Content:  Thought content normal          Judgment: Judgment normal

## 2021-06-28 ENCOUNTER — HOSPITAL ENCOUNTER (OUTPATIENT)
Dept: NON INVASIVE DIAGNOSTICS | Facility: CLINIC | Age: 56
Discharge: HOME | End: 2021-06-28
Payer: COMMERCIAL

## 2021-06-28 DIAGNOSIS — I83.11 VARICOSE VEINS OF BOTH LOWER EXTREMITIES WITH INFLAMMATION: ICD-10-CM

## 2021-06-28 DIAGNOSIS — I83.12 VARICOSE VEINS OF BOTH LOWER EXTREMITIES WITH INFLAMMATION: ICD-10-CM

## 2021-06-28 PROCEDURE — 93970 EXTREMITY STUDY: CPT

## 2021-06-29 PROCEDURE — 93970 EXTREMITY STUDY: CPT | Performed by: SURGERY

## 2021-07-29 DIAGNOSIS — E55.9 VITAMIN D DEFICIENCY: ICD-10-CM

## 2021-07-29 RX ORDER — ERGOCALCIFEROL 1.25 MG/1
CAPSULE ORAL
Qty: 12 CAPSULE | Refills: 0 | OUTPATIENT
Start: 2021-07-29

## 2021-07-29 NOTE — TELEPHONE ENCOUNTER
Medication refill received from [facility]  Please let the patient know that we do not accept refills directly from the pharmacy

## 2021-07-29 NOTE — TELEPHONE ENCOUNTER
Left detailed message (Stahl Both per communication form in chart ) informing patient and to call the office with any further questions or concerns

## 2021-08-12 ENCOUNTER — OFFICE VISIT (OUTPATIENT)
Dept: VASCULAR SURGERY | Facility: CLINIC | Age: 56
End: 2021-08-12

## 2021-08-12 VITALS
WEIGHT: 152 LBS | HEART RATE: 76 BPM | HEIGHT: 67 IN | BODY MASS INDEX: 23.86 KG/M2 | TEMPERATURE: 97.6 F | SYSTOLIC BLOOD PRESSURE: 130 MMHG | DIASTOLIC BLOOD PRESSURE: 80 MMHG

## 2021-08-12 DIAGNOSIS — I83.11 VARICOSE VEINS OF BOTH LOWER EXTREMITIES WITH INFLAMMATION: Primary | ICD-10-CM

## 2021-08-12 DIAGNOSIS — I83.12 VARICOSE VEINS OF BOTH LOWER EXTREMITIES WITH INFLAMMATION: Primary | ICD-10-CM

## 2021-08-12 PROCEDURE — 99024 POSTOP FOLLOW-UP VISIT: CPT | Performed by: PHYSICIAN ASSISTANT

## 2021-08-12 NOTE — PROGRESS NOTES
Assessment/Plan:    Bilateral varicose veins status post EVLT  S/P R GSV EVLT and Stab phlebectomies  Lilian Rogers, 6/18/21)  S/P L GSV and stab phlebectomy Lilian Rogers, 3/12/21)    - 6 weeks postop after right EVLT and stab phlebectomies  - doing well; symptoms of leg pain, heaviness, swelling have resolved  - stab/procedure sites are healed    - Recommend compression stockings  - Elevate legs while at rest  - Continue healthy life-style changes; heart-healthy, low sodium diet; regular exercise  - Patient education for venous insufficiency  - Follow up as needed for any worsening of symptoms       Diagnoses and all orders for this visit:    Varicose veins of both lower extremities with inflammation        Subjective:      Patient ID: Susana Tracy is a 61 y o  female  Patient is s/p R GSV EVLT and stabs on 6/18, Dr Kassandra Ramirez, presents today for post-op f/u visit  Patient is doing well, no complaints/concerns at this time  Mrs Ángel Allred is pleased with the results of EVLTs  She reports that her legs feel well and improved  Symptoms of painful, tired and heavy legs has resolved  She has no post-op concerns after R EVLT  Stab sites are healed  She has no leg pain or discomfort  No new sx since her 6/28 duplex which showed typical post EVLT findings  Patient education regarding venous insufficiency was provided  She should follow up as needed           VAS LEV 6/28/21:  FINDINGS:     Segment         Right               Left                              Impression          Impression              GSV Inguinal    Thrombosed (acute)                          GSV Prox Thigh  Thrombosed (acute)  Thrombosed (Chronic)    GSV Mid Thigh   Thrombosed (acute)  Thrombosed (Chronic)    GSV Dist Thigh  Thrombosed (acute)  Thrombosed (Chronic)             CONCLUSION:     Impression:  RIGHT LOWER LIMB:  Evaluation shows evidence of  stable E-HIT classification - Class I  There is acute superficial thrombosis noted in the great saphenous vein from  the mid/distal thigh to the sapheno-femoral junction s/p EVLT  The thrombus does not extend into the common femoral vein  Doppler evaluation shows a normal response to augmentation maneuvers  Popliteal, posterior tibial and anterior tibial arterial Doppler waveforms are  triphasic  LEFT LOWER LIMB:  No evidence of acute or chronic deep vein thrombosis  The great saphenous vein is thrombosed from 1cm below the sapheno-femoral  junction to the distal thigh s/p EVLT  Doppler evaluation shows a normal response to augmentation maneuvers  Popliteal, posterior tibial and anterior tibial arterial Doppler waveforms are  Triphasic  The following portions of the patient's history were reviewed and updated as appropriate: allergies, current medications, past family history, past medical history, past social history, past surgical history and problem list     Review of Systems   Constitutional: Negative  HENT: Negative  Eyes: Negative  Respiratory: Negative  Cardiovascular: Negative  Gastrointestinal: Negative  Endocrine: Negative  Genitourinary: Negative  Musculoskeletal: Negative  Skin: Negative  Allergic/Immunologic: Negative  Neurological: Negative  Hematological: Negative  Psychiatric/Behavioral: Negative  Objective:      /80 (BP Location: Right arm, Patient Position: Sitting)   Pulse 76   Temp 97 6 °F (36 4 °C) (Tympanic)   Ht 5' 7" (1 702 m)   Wt 68 9 kg (152 lb)   BMI 23 81 kg/m²     R LE with healed stab sites  No evidence of infection  Mild R ankle edema     Physical Exam  Vitals and nursing note reviewed  Constitutional:       Appearance: She is well-developed  HENT:      Head: Normocephalic and atraumatic  Eyes:      Pupils: Pupils are equal, round, and reactive to light  Neck:      Thyroid: No thyromegaly  Vascular: No JVD        Trachea: Trachea normal    Cardiovascular:      Rate and Rhythm: Normal rate and regular rhythm  Pulses:           Carotid pulses are 2+ on the right side and 2+ on the left side  Radial pulses are 2+ on the right side and 2+ on the left side  Dorsalis pedis pulses are 2+ on the right side and 2+ on the left side  Heart sounds: Normal heart sounds, S1 normal and S2 normal  No murmur heard  No friction rub  No gallop  Pulmonary:      Effort: Pulmonary effort is normal  No accessory muscle usage or respiratory distress  Breath sounds: Normal breath sounds  No wheezing or rales  Abdominal:      General: Bowel sounds are normal  There is no distension  Palpations: Abdomen is soft  Tenderness: There is no abdominal tenderness  Musculoskeletal:         General: No deformity  Normal range of motion  Cervical back: Neck supple  Skin:     General: Skin is warm and dry  Findings: No lesion or rash  Nails: There is no clubbing  Neurological:      Mental Status: She is alert and oriented to person, place, and time  Comments: Grossly normal    Psychiatric:         Behavior: Behavior is cooperative  I have reviewed and made appropriate changes to the review of systems input by the medical assistant      Vitals:    08/12/21 0856   BP: 130/80   BP Location: Right arm   Patient Position: Sitting   Pulse: 76   Temp: 97 6 °F (36 4 °C)   TempSrc: Tympanic   Weight: 68 9 kg (152 lb)   Height: 5' 7" (1 702 m)       Patient Active Problem List   Diagnosis    Patellofemoral arthritis of left knee    Thrombophlebitis of saphenous vein, right    Varicose veins of both lower extremities with inflammation    Cigarette nicotine dependence in remission    Menopause    Vitamin D deficiency    Abnormal chest CT    Pulmonary fibrosis (HCC)    Centrilobular emphysema (HCC)    Pulmonary nodules    Gastroesophageal reflux disease without esophagitis       Past Surgical History:   Procedure Laterality Date    CHOLECYSTECTOMY      ENDOVASCULAR LASER THERAPY (EVLT)      Left    AR ENDOVENOUS LASER, 1ST VEIN Left 3/12/2021    Procedure: ENDOVASCULAR LASER THERAPY (EVLT) + Stab Phlebectomy (10-20); Surgeon: Mary Romeo MD;  [facility];  Service: Vascular    AR ENDOVENOUS LASER, 1ST VEIN Right 6/18/2021    Procedure: ENDOVASCULAR LASER THERAPY (EVLT) R GSV EVLT + Stab Phlebectomy (10-20); Surgeon: Mary Romeo MD;  [facility];  Service: Vascular    AR WRIST Wyvonne Bang LIG Left 5/19/2017    Procedure: ENDOSCOPIC CARPAL TUNNEL RELEASE ;  Surgeon: Marco A Rivas MD;  [facility];  Service: Orthopedics    TONSILLECTOMY         Family History   Problem Relation Age of Onset    Cancer Father [de-identified]        thinks colon cancer    Heart attack Father 76    Heart attack Mother     Thyroid disease Mother     Atrial fibrillation Mother         pacemaker    Lung cancer Sister         (they share a mother only)     No Known Problems Brother     No Known Problems Daughter     No Known Problems Daughter     No Known Problems Paternal Aunt        Social History     Socioeconomic History    Marital status: Legally      Spouse name: Not on file    Number of children: 2    Years of education: Not on file    Highest education level: Not on file   Occupational History    Not on file   Tobacco Use    Smoking status: Former Smoker     Packs/day: 1 00     Years: 30 00     Pack years: 30 00     Start date: 12/13/2020    Smokeless tobacco: Never Used    Tobacco comment: last cig 12/22/2020   Vaping Use    Vaping Use: Never used   Substance and Sexual Activity    Alcohol use:  Yes     Alcohol/week: 7 0 standard drinks     Types: 7 Glasses of wine per week     Comment: Daily    Drug use: No    Sexual activity: Not Currently   Other Topics Concern    Not on file   Social History Narrative    Not on file     Social Determinants of Health     Financial Resource Strain:     Difficulty of Paying Living Expenses:    Food Insecurity:     Worried About [facility] in the Last Year:     [facility] in the Last Year:    Transportation Needs:     Lack of Transportation (Medical):  Lack of Transportation (Non-Medical):    Physical Activity:     Days of Exercise per Week:     Minutes of Exercise per Session:    Stress:     Feeling of Stress :    Social Connections:     Frequency of Communication with Friends and Family:     Frequency of Social Gatherings with Friends and Family:     Attends [employer] Services:     Active Member of Clubs or Organizations:     Attends Club or Organization Meetings:     Marital Status:    Intimate Partner Violence:     Fear of Current or Ex-Partner:     Emotionally Abused:     Physically Abused:     Sexually Abused:         Allergies   Allergen Reactions    Seasonal Ic  [Cholestatin]          Current Outpatient Medications:     diclofenac sodium (VOLTAREN) 1 %, Apply 2 g topically 4 (four) times a day, Disp: , Rfl:     ergocalciferol (VITAMIN D2) 50,000 units, Take 1 capsule (50,000 Units total) by mouth once a week for 12 doses, Disp: 12 capsule, Rfl: 0    ibuprofen (MOTRIN) 600 mg tablet, Take 1 tablet by mouth every 6 (six) hours as needed for mild pain or moderate pain for up to 30 days, Disp: 10 tablet, Rfl: 0    Multiple Vitamin (MULTI-VITAMIN DAILY PO), Take 1 tablet by mouth daily, Disp: , Rfl:     omeprazole (PriLOSEC) 20 mg delayed release capsule, Take 1 capsule (20 mg total) by mouth daily, Disp: 30 capsule, Rfl: 2    umeclidinium-vilanterol (Anoro Ellipta) 62 5-25 MCG/INH inhaler, Inhale 1 puff daily, Disp: 1 Inhaler, Rfl: 5

## 2021-08-12 NOTE — PATIENT INSTRUCTIONS
Bilateral varicose veins status post EVLT  - 6 weeks postop after right EVLT and stab phlebectomies  - doing well; symptoms of leg pain, heaviness, swelling have resolved  - stab/procedure sites are healed    - Recommend compression stockings  - Elevate legs while at rest  - Continue healthy life-style changes; heart-healthy, low sodium diet; regular exercise  - Patient education for venous insufficiency    - Follow up as needed for any worsening of symptoms

## 2021-10-20 ENCOUNTER — HOSPITAL ENCOUNTER (OUTPATIENT)
Dept: CT IMAGING | Facility: HOSPITAL | Age: 56
Discharge: HOME | End: 2021-10-20
Attending: INTERNAL MEDICINE
Payer: COMMERCIAL

## 2021-10-20 DIAGNOSIS — R91.8 PULMONARY NODULES: ICD-10-CM

## 2021-10-20 DIAGNOSIS — J43.2 CENTRILOBULAR EMPHYSEMA (HCC): ICD-10-CM

## 2021-10-20 PROCEDURE — 71250 CT THORAX DX C-: CPT

## 2021-10-20 PROCEDURE — G1004 CDSM NDSC: HCPCS

## 2021-10-24 DIAGNOSIS — J43.2 CENTRILOBULAR EMPHYSEMA (HCC): ICD-10-CM

## 2021-10-25 RX ORDER — UMECLIDINIUM BROMIDE AND VILANTEROL TRIFENATATE 62.5; 25 UG/1; UG/1
POWDER RESPIRATORY (INHALATION)
Qty: 60 BLISTER | Refills: 5 | Status: SHIPPED | OUTPATIENT
Start: 2021-10-25 | End: 2022-03-21 | Stop reason: SDUPTHER

## 2021-10-27 ENCOUNTER — TELEPHONE (OUTPATIENT)
Dept: PULMONOLOGY | Facility: CLINIC | Age: 56
End: 2021-10-27

## 2021-10-27 DIAGNOSIS — R91.8 PULMONARY NODULES: ICD-10-CM

## 2021-10-27 DIAGNOSIS — F17.211 CIGARETTE NICOTINE DEPENDENCE IN REMISSION: Primary | ICD-10-CM

## 2022-02-26 ENCOUNTER — OFFICE VISIT (OUTPATIENT)
Dept: URGENT CARE | Facility: MEDICAL CENTER | Age: 57
End: 2022-02-26
Payer: COMMERCIAL

## 2022-02-26 VITALS
HEIGHT: 67 IN | HEART RATE: 94 BPM | OXYGEN SATURATION: 95 % | DIASTOLIC BLOOD PRESSURE: 90 MMHG | WEIGHT: 150 LBS | SYSTOLIC BLOOD PRESSURE: 158 MMHG | RESPIRATION RATE: 18 BRPM | TEMPERATURE: 98.6 F | BODY MASS INDEX: 23.54 KG/M2

## 2022-02-26 DIAGNOSIS — H69.82 EUSTACHIAN TUBE DYSFUNCTION, LEFT: Primary | ICD-10-CM

## 2022-02-26 PROCEDURE — 99213 OFFICE O/P EST LOW 20 MIN: CPT

## 2022-02-26 RX ORDER — PREDNISONE 10 MG/1
10 TABLET ORAL DAILY
Qty: 30 TABLET | Refills: 0 | Status: SHIPPED | OUTPATIENT
Start: 2022-02-26 | End: 2022-03-21 | Stop reason: ALTCHOICE

## 2022-02-26 NOTE — PROGRESS NOTES
[facility]        NAME: Gabbie Dooley is a 61 y o  female  : 1965    MRN: [id number]  DATE: 2022  TIME: 4:05 PM    Assessment and Plan   Eustachian tube dysfunction, left [H69 82]  1  Eustachian tube dysfunction, left  predniSONE 10 mg tablet    Ambulatory Referral to Otolaryngology         Patient Instructions     Take prednisone one time a day for 10 days as a taper: 5 tabs for 2 days, 4 tabs for 2 days, 3 tabs for 2 days, 2 tabs for 2 days, then 1 tab for 2 days  Do not take at night as it can cause insomnia  Potential side effects include: weight gain, swelling in extremities, insomnia, mood/behavior changes, hyperglycemia, increased blood pressure  Go to the emergency department if you experience deafness in either ear, fever greater than 104° F, severe ringing in your ear that does not subside  Follow up with PCP in 3-5 days  Proceed to  ER if symptoms worsen  Chief Complaint     Chief Complaint   Patient presents with    Earache     left ear pain that started yesterday          History of Present Illness     Gabbie Dooley is a 61 y o  female who has history significant for GERD and smoking cigarettes, who presents today with complaint of left ear pain that has been present for the past 3 weeks, worsening yesterday and today  She has had ear problems in the past, always with the left ear  She had 1 dizzy spell yesterday, but none since  She denies tinnitus, blurred vision, upper respiratory symptoms, nausea, vomiting, diarrhea, cough, chest pain, shortness of breath  Review of Systems   Review of Systems   Constitutional: Negative for chills, fatigue and fever  HENT: Positive for ear pain (Left)  Negative for congestion, rhinorrhea, sinus pressure and sore throat  Respiratory: Negative for cough, shortness of breath and wheezing  Cardiovascular: Negative for chest pain and palpitations     Gastrointestinal: Negative for abdominal pain, constipation, diarrhea, nausea and vomiting  Musculoskeletal: Negative for myalgias  Neurological: Negative for headaches           Current Medications       Current Outpatient Medications:     Anoro Ellipta 62 5-25 MCG/INH inhaler, INHALE ONE PUFF BY MOUTH EVERY DAY, Disp: 60 blister, Rfl: 5    diclofenac sodium (VOLTAREN) 1 %, Apply 2 g topically 4 (four) times a day, Disp: , Rfl:     Multiple Vitamin (MULTI-VITAMIN DAILY PO), Take 1 tablet by mouth daily, Disp: , Rfl:     omeprazole (PriLOSEC) 20 mg delayed release capsule, Take 1 capsule (20 mg total) by mouth daily, Disp: 30 capsule, Rfl: 2    ergocalciferol (VITAMIN D2) 50,000 units, Take 1 capsule (50,000 Units total) by mouth once a week for 12 doses, Disp: 12 capsule, Rfl: 0    ibuprofen (MOTRIN) 600 mg tablet, Take 1 tablet by mouth every 6 (six) hours as needed for mild pain or moderate pain for up to 30 days, Disp: 10 tablet, Rfl: 0    predniSONE 10 mg tablet, Take 1 tablet (10 mg total) by mouth daily, Disp: 30 tablet, Rfl: 0    Current Allergies     Allergies as of 02/26/2022 - Reviewed 02/26/2022   Allergen Reaction Noted    Seasonal ic  [cholestatin]  09/21/2016            The following portions of the patient's history were reviewed and updated as appropriate: allergies, current medications, past family history, past medical history, past social history, past surgical history and problem list      Past Medical History:   Diagnosis Date    Arthritis     RIGHT KNEE     [facility] [address] [facility]     DVT (deep venous thrombosis) [facility] [address] [facility]     Sept 2020    Emphysema of lung ([facility] [address] [facility]     GERD (gastroesophageal reflux disease)     Gestational hypertension     Nerve pain     Pneumonia     2018    Pulmonary emphysema ([address] )     Thrombophlebitis     RIGHT LEG        Past Surgical History:   Procedure Laterality Date    CHOLECYSTECTOMY      ENDOVASCULAR LASER THERAPY (EVLT)      Left    [place] ENDOVENOUS LASER, 1ST VEIN Left 3/12/2021    Procedure: ENDOVASCULAR LASER THERAPY (EVLT) + Stab Phlebectomy (10-20); Surgeon: Aimee Villegas MD;  [facility];  Service: Vascular    [place] ENDOVENOUS LASER, 1ST VEIN Right 6/18/2021    Procedure: ENDOVASCULAR LASER THERAPY (EVLT) R GSV EVLT + Stab Phlebectomy (10-20); Surgeon: Aimee Villegas MD;  [facility];  Service: Vascular    TN WRIST Danielle Brownaraceli LIG Left 5/19/2017    Procedure: ENDOSCOPIC CARPAL TUNNEL RELEASE ;  Surgeon: Dominique Sultana MD;  [facility];  Service: Orthopedics    TONSILLECTOMY         Family History   Problem Relation Age of Onset    Cancer Father [de-identified]        thinks colon cancer    Heart attack Father 76    Heart attack Mother     Thyroid disease Mother     Atrial fibrillation Mother         pacemaker    Lung cancer Sister         (they share a mother only)     No Known Problems Brother     No Known Problems Daughter     No Known Problems Daughter     No Known Problems Paternal Aunt          Medications have been verified  Objective   /90   Pulse 94   Temp 98 6 °F (37 °C)   Resp 18   Ht 5' 7" (1 702 m)   Wt 68 kg (150 lb)   SpO2 95%   BMI 23 49 kg/m²   No LMP recorded  Patient is postmenopausal        Physical Exam     Physical Exam  Vitals reviewed  Constitutional:       Appearance: Normal appearance  HENT:      Head: Normocephalic and atraumatic  Right Ear: Hearing, tympanic membrane, ear canal and external ear normal  There is no impacted cerumen  Tympanic membrane is not perforated or erythematous  Left Ear: Hearing, ear canal and external ear normal  There is no impacted cerumen  Tympanic membrane is bulging  Tympanic membrane is not perforated or erythematous  Ears:      Comments: Left TM appeared to be bulging, but remained pearly gray with clear cone of light, TM was also angulated with anterior portion of the TM angulated medially       Nose: No congestion or rhinorrhea  Mouth/Throat:      Mouth: Mucous membranes are moist       Pharynx: Oropharynx is clear  Uvula midline  No oropharyngeal exudate, posterior oropharyngeal erythema or uvula swelling  Eyes:      General:         Right eye: No discharge  Left eye: No discharge  Extraocular Movements: Extraocular movements intact  Conjunctiva/sclera: Conjunctivae normal       Pupils: Pupils are equal, round, and reactive to light  Cardiovascular:      Rate and Rhythm: Normal rate and regular rhythm  Heart sounds: Normal heart sounds  No murmur heard  No friction rub  No gallop  Pulmonary:      Effort: Pulmonary effort is normal       Breath sounds: Normal breath sounds  No wheezing, rhonchi or rales  Neurological:      Mental Status: She is alert

## 2022-02-26 NOTE — PATIENT INSTRUCTIONS
Take prednisone one time a day for 10 days as a taper: 5 tabs for 2 days, 4 tabs for 2 days, 3 tabs for 2 days, 2 tabs for 2 days, then 1 tab for 2 days  Do not take at night as it can cause insomnia  Potential side effects include: weight gain, swelling in extremities, insomnia, mood/behavior changes, hyperglycemia, increased blood pressure  Go to the emergency department if you experience deafness in either ear, fever greater than 104° F, severe ringing in your ear that does not subside

## 2022-02-28 ENCOUNTER — TELEPHONE (OUTPATIENT)
Dept: OTOLARYNGOLOGY | Facility: CLINIC | Age: 57
End: 2022-02-28

## 2022-03-01 ENCOUNTER — TELEPHONE (OUTPATIENT)
Dept: OTOLARYNGOLOGY | Facility: CLINIC | Age: 57
End: 2022-03-01

## 2022-03-01 NOTE — TELEPHONE ENCOUNTER
I called and left message for the 2nd time for the patient to call the office back regarding an ENT consult

## 2022-03-21 ENCOUNTER — OFFICE VISIT (OUTPATIENT)
Dept: PULMONOLOGY | Facility: CLINIC | Age: 57
End: 2022-03-21
Payer: COMMERCIAL

## 2022-03-21 VITALS
TEMPERATURE: 99.3 F | SYSTOLIC BLOOD PRESSURE: 162 MMHG | DIASTOLIC BLOOD PRESSURE: 90 MMHG | HEIGHT: 67 IN | HEART RATE: 99 BPM | WEIGHT: 155 LBS | OXYGEN SATURATION: 96 % | BODY MASS INDEX: 24.33 KG/M2

## 2022-03-21 DIAGNOSIS — J43.2 CENTRILOBULAR EMPHYSEMA (HCC): ICD-10-CM

## 2022-03-21 DIAGNOSIS — F17.210 CIGARETTE NICOTINE DEPENDENCE WITHOUT COMPLICATION: Primary | ICD-10-CM

## 2022-03-21 DIAGNOSIS — J84.10 PULMONARY FIBROSIS (HCC): ICD-10-CM

## 2022-03-21 DIAGNOSIS — R91.8 PULMONARY NODULES: ICD-10-CM

## 2022-03-21 DIAGNOSIS — K21.9 GASTROESOPHAGEAL REFLUX DISEASE WITHOUT ESOPHAGITIS: ICD-10-CM

## 2022-03-21 PROCEDURE — 99214 OFFICE O/P EST MOD 30 MIN: CPT | Performed by: INTERNAL MEDICINE

## 2022-03-21 RX ORDER — BUPROPION HYDROCHLORIDE 150 MG/1
150 TABLET, EXTENDED RELEASE ORAL 2 TIMES DAILY
Qty: 60 TABLET | Refills: 2 | Status: SHIPPED | OUTPATIENT
Start: 2022-03-21

## 2022-03-21 RX ORDER — UMECLIDINIUM BROMIDE AND VILANTEROL TRIFENATATE 62.5; 25 UG/1; UG/1
1 POWDER RESPIRATORY (INHALATION) DAILY
Qty: 60 BLISTER | Refills: 5 | Status: SHIPPED | OUTPATIENT
Start: 2022-03-21 | End: 2022-05-19 | Stop reason: SDUPTHER

## 2022-03-21 NOTE — ASSESSMENT & PLAN NOTE
We discussed the importance of smoking cessation  She found Wellbutrin to be beneficial in the past   I have prescribed again  I instructed her to take 1 tablet daily for 3 days, then increase to twice daily  She should pick a quit date within 7 days of starting therapy

## 2022-03-21 NOTE — ASSESSMENT & PLAN NOTE
Patient has scattered pulmonary nodules, largest measures 1 1 cm in the right lower lobe  She will undergo repeat CT to document stability  Given her smoking history, she will need annual lung cancer screening

## 2022-03-21 NOTE — PROGRESS NOTES
Pulmonary Follow Up Note   Boogie Greene 61 y o  female MRN: [id number]  3/21/2022      Assessment/Plan:     Centrilobular emphysema ([facility] )  Despite having moderate emphysematous changes on imaging, no significant obstruction on PFTs  She has found Anoro Ellipta to be beneficial   I provided her with refills today  Cigarette nicotine dependence without complication  We discussed the importance of smoking cessation  She found Wellbutrin to be beneficial in the past   I have prescribed again  I instructed her to take 1 tablet daily for 3 days, then increase to twice daily  She should pick a quit date within 7 days of starting therapy  Pulmonary nodules  Patient has scattered pulmonary nodules, largest measures 1 1 cm in the right lower lobe  She will undergo repeat CT to document stability  Given her smoking history, she will need annual lung cancer screening  Pulmonary fibrosis ([facility] )  She has minimal fibrotic changes, also stable  Given her longstanding history of reflux, I would like her evaluated by GI to exclude reflux as a contributing factor to the fibrotic changes  Visit orders:    Diagnoses and all orders for this visit:    Cigarette nicotine dependence without complication  -     buPROPion (Wellbutrin SR) 150 mg 12 hr tablet; Take 1 tablet (150 mg total) by mouth 2 (two) times a day    Centrilobular emphysema (HCC)  -     umeclidinium-vilanterol (Anoro Ellipta) 62 5-25 MCG/INH inhaler; Inhale 1 puff daily    Gastroesophageal reflux disease without esophagitis  -     Ambulatory Referral to Gastroenterology; Future    Pulmonary fibrosis ([facility] )  -     Ambulatory Referral to Gastroenterology; Future    Pulmonary nodules  -     CT chest without contrast; Future        No follow-ups on file  History of Present Illness   HPI:  Boogie Greene is a 61 y o  female who is here today for follow-up regarding emphysema and pulmonary nodules  Patient has been well from pulmonary perspective    She uses Anoro once daily  She does not use albuterol on any regular basis  She had been able to quit smoking with the assistance of Wellbutrin, but given recent stressors in life, she is back to smoking 5 or 6 cigarettes per day  She denies wheezing  No recent ER visits or hospitalizations  She has heartburn and still has some symptoms periodically despite taking PPI  She was given a referral for GI, but given her recent move, she was unable to schedule it  Review of Systems   Constitutional: Negative for chills, fever and unexpected weight change  HENT: Negative for postnasal drip and sore throat  Eyes: Negative for visual disturbance  Respiratory:        As noted in HPI   Cardiovascular: Negative for chest pain  Gastrointestinal: Negative for abdominal pain, diarrhea and vomiting  Musculoskeletal: Negative for arthralgias  Skin: Negative for rash  Neurological: Negative for headaches  Hematological: Negative for adenopathy  Psychiatric/Behavioral: Negative  All other systems reviewed and are negative  Medical, Family and Social history reviewed and updated as appropriate    Historical Information   Past Medical History:   Diagnosis Date    Arthritis     RIGHT KNEE     COPD [facility]chronic obstructive [facility] disease) [facility] )     DVT (deep venous thrombosis) ([facility] )     Sept 2020    Emphysema of lung ([facility] )     GERD (gastroesophageal reflux disease)     Gestational hypertension     Nerve pain     Pneumonia     2018    Pulmonary emphysema (HCC)     Thrombophlebitis     RIGHT LEG      Past Surgical History:   Procedure Laterality Date    CHOLECYSTECTOMY      ENDOVASCULAR LASER THERAPY (EVLT)      Left    DE ENDOVENOUS LASER, 1ST VEIN Left 3/12/2021    Procedure: ENDOVASCULAR LASER THERAPY (EVLT) + Stab Phlebectomy (10-20);   Surgeon: Tierra Buenrostro MD;  [facility];  Service: Vascular    DE ENDOVENOUS LASER, 1ST VEIN Right 6/18/2021    Procedure: ENDOVASCULAR LASER THERAPY (EVLT) R GSV EVLT + Stab Phlebectomy (10-20);   Surgeon: Sherren Reams, MD;  [facility];  Service: Vascular    CT WRIST Kaylene Smith LIG Left 5/19/2017    Procedure: ENDOSCOPIC CARPAL TUNNEL RELEASE ;  Surgeon: Kai Hooks MD;  [facility];  Service: Orthopedics    TONSILLECTOMY       Family History   Problem Relation Age of Onset    Cancer Father [de-identified]        thinks colon cancer    Heart attack Father 76    Heart attack Mother     Thyroid disease Mother     Atrial fibrillation Mother         pacemaker    Lung cancer Sister         (they share a mother only)     No Known Problems Brother     No Known Problems Daughter     No Known Problems Daughter     No Known Problems Paternal Aunt        Social History     Tobacco Use   Smoking Status Current Every Day Smoker    Packs/day: 1 00    Years: 30 00    Pack years: 30 00    Types: Cigarettes    Start date: 12/13/2020   Smokeless Tobacco Never Used         Meds/Allergies     Current Outpatient Medications:     diclofenac sodium (VOLTAREN) 1 %, Apply 2 g topically 4 (four) times a day, Disp: , Rfl:     ergocalciferol (VITAMIN D2) 50,000 units, Take 1 capsule (50,000 Units total) by mouth once a week for 12 doses, Disp: 12 capsule, Rfl: 0    ibuprofen (MOTRIN) 600 mg tablet, Take 1 tablet by mouth every 6 (six) hours as needed for mild pain or moderate pain for up to 30 days, Disp: 10 tablet, Rfl: 0    Multiple Vitamin (MULTI-VITAMIN DAILY PO), Take 1 tablet by mouth daily, Disp: , Rfl:     omeprazole (PriLOSEC) 20 mg delayed release capsule, Take 1 capsule (20 mg total) by mouth daily, Disp: 30 capsule, Rfl: 2    umeclidinium-vilanterol (Anoro Ellipta) 62 5-25 MCG/INH inhaler, Inhale 1 puff daily, Disp: 60 blister, Rfl: 5    buPROPion (Wellbutrin SR) 150 mg 12 hr tablet, Take 1 tablet (150 mg total) by mouth 2 (two) times a day, Disp: 60 tablet, Rfl: 2  Allergies   Allergen Reactions    Seasonal Ic [Cholestatin]        Vitals: Blood pressure 162/90, pulse 99, temperature 99 3 °F (37 4 °C), height 5' 7" (1 702 m), weight 70 3 kg (155 lb), SpO2 96 %  Body mass index is 24 28 kg/m²  Oxygen Therapy  SpO2: 96 %  Oxygen Therapy: None (Room air)    Physical Exam   Physical Exam  Constitutional:       General: She is not in acute distress  HENT:      Head: Normocephalic  Eyes:      General: No scleral icterus  Neck:      Vascular: No JVD  Cardiovascular:      Rate and Rhythm: Normal rate and regular rhythm  Pulmonary:      Breath sounds: No wheezing, rhonchi or rales  Abdominal:      Tenderness: There is no abdominal tenderness  Musculoskeletal:      Cervical back: Neck supple  Lymphadenopathy:      Cervical: No cervical adenopathy  Skin:     General: Skin is warm and dry  Neurological:      Mental Status: She is alert and oriented to person, place, and time  Psychiatric:         Mood and Affect: Mood normal          Labs: I have personally reviewed pertinent lab results  Lab Results   Component Value Date    WBC 9 28 11/24/2020    HGB 14 5 11/24/2020    HCT 44 8 11/24/2020    MCV 92 11/24/2020     11/24/2020     Lab Results   Component Value Date    GLUCOSE 104 09/28/2015    CALCIUM 9 0 11/24/2020     09/28/2015    K 4 9 11/24/2020    CO2 27 11/24/2020     11/24/2020    BUN 16 11/24/2020    CREATININE 0 65 11/24/2020     No results found for: IGE  Lab Results   Component Value Date    ALT 25 11/24/2020    AST 12 11/24/2020    ALKPHOS 65 11/24/2020    BILITOT 0 26 09/28/2015       Imaging and other studies: I have personally reviewed pertinent reports  and I have personally reviewed pertinent films in PACS CT of the chest from October 2021 shows 1 1 x 0 6 cm right lower lobe pulmonary nodule with additional subcentimeter nodules, all stable in size  There are moderate emphysematous changes      Pulmonary function testing:  Performed 12/7/20 and personally interpreted  FEV1/FVC ratio 74%   FEV1 71% predicted  FVC 75% predicted  TLC 84 % predicted   % predicted  DLCO corrected for hemoglobin 35 % predicted    Normal PFTs with the exception of isolated diffusion impairment    Echocardiogram done on 1/4/21 - EF 55%, PA pressure 22 mm Hg

## 2022-03-21 NOTE — ASSESSMENT & PLAN NOTE
She has minimal fibrotic changes, also stable  Given her longstanding history of reflux, I would like her evaluated by GI to exclude reflux as a contributing factor to the fibrotic changes

## 2022-03-21 NOTE — ASSESSMENT & PLAN NOTE
Despite having moderate emphysematous changes on imaging, no significant obstruction on PFTs  She has found Anoro Ellipta to be beneficial   I provided her with refills today

## 2022-03-24 PROBLEM — F17.200 SMOKER: Status: ACTIVE | Noted: 2022-03-24

## 2022-03-25 ENCOUNTER — OFFICE VISIT (OUTPATIENT)
Dept: FAMILY MEDICINE CLINIC | Facility: CLINIC | Age: 57
End: 2022-03-25
Payer: COMMERCIAL

## 2022-03-25 VITALS
WEIGHT: 155 LBS | SYSTOLIC BLOOD PRESSURE: 152 MMHG | OXYGEN SATURATION: 99 % | RESPIRATION RATE: 16 BRPM | HEART RATE: 93 BPM | DIASTOLIC BLOOD PRESSURE: 84 MMHG | BODY MASS INDEX: 24.33 KG/M2 | TEMPERATURE: 98.7 F | HEIGHT: 67 IN

## 2022-03-25 DIAGNOSIS — E55.9 VITAMIN D DEFICIENCY: ICD-10-CM

## 2022-03-25 DIAGNOSIS — Z12.4 SCREENING FOR CERVICAL CANCER: ICD-10-CM

## 2022-03-25 DIAGNOSIS — Z12.11 SCREENING FOR COLORECTAL CANCER: ICD-10-CM

## 2022-03-25 DIAGNOSIS — Z12.12 SCREENING FOR COLORECTAL CANCER: ICD-10-CM

## 2022-03-25 DIAGNOSIS — Z00.00 WELL ADULT EXAM: Primary | ICD-10-CM

## 2022-03-25 DIAGNOSIS — Z23 NEED FOR VACCINATION: ICD-10-CM

## 2022-03-25 DIAGNOSIS — J84.10 PULMONARY FIBROSIS (HCC): ICD-10-CM

## 2022-03-25 DIAGNOSIS — Z12.31 ENCOUNTER FOR SCREENING MAMMOGRAM FOR BREAST CANCER: ICD-10-CM

## 2022-03-25 DIAGNOSIS — K21.9 GASTROESOPHAGEAL REFLUX DISEASE WITHOUT ESOPHAGITIS: ICD-10-CM

## 2022-03-25 PROCEDURE — 90471 IMMUNIZATION ADMIN: CPT

## 2022-03-25 PROCEDURE — 90682 RIV4 VACC RECOMBINANT DNA IM: CPT

## 2022-03-25 PROCEDURE — 99396 PREV VISIT EST AGE 40-64: CPT | Performed by: FAMILY MEDICINE

## 2022-03-25 NOTE — PATIENT INSTRUCTIONS
We recommend you get a shingles vaccine  There is a new vaccine called Shingrix  This vaccine is recommended for those age 48 and over to protect against shingles  If you previously had a shingles vaccine called Zostavax you should get this updated shot at least 2 months after you had the Zostavax  Shingrix is not a live vaccine so you can get this vaccine even if you are immunocompromised  *You should check with the pharmacist your insurance to see if this is covered  It is a 2 dose vaccine series - after you get the first you should get the second dose 2 - 6 months later  If you miss this window you do not need to start again  The cost of the vaccine is about $230 00 or more for each shot  if it is not covered

## 2022-03-25 NOTE — PROGRESS NOTES
Assessment/Plan:     Problem List Items Addressed This Visit        Unprioritized    Gastroesophageal reflux disease without esophagitis    Relevant Orders    CBC and differential    Pulmonary fibrosis (HCC)    Relevant Orders    Comprehensive metabolic panel    Vitamin D deficiency    Relevant Orders    Vitamin D 25 hydroxy      Other Visit Diagnoses     Well adult exam    -  Primary    Screening for cervical cancer        Relevant Orders    Ambulatory referral to Obstetrics / Gynecology    Screening for colorectal cancer        Encounter for screening mammogram for breast cancer        Relevant Orders    Mammo screening bilateral w 3d & cad    Need for vaccination        Relevant Orders    influenza vaccine, quadrivalent, recombinant, PF, 0 5 mL, for patients 18 yr+ (FLUBLOK) (Completed)          Well adult exam  ·         Continue healthy diet   ·         Encourage exercise 4 times a week or more for minimum 30 minutes  ·         Continue to see dentist, wear seatbelt  ·         Health maintenance reviewed - prescription to update mammo, pap  Update labs  Flu shot today  Will return for Prevnar 20  Was referred for colonoscopy - has appointment to see GI already  Update labs  Started on wellbutrin for smoking cessation  Reviewed age appropriate health maintenance screenings and immunizations that are due, risks and benefits of these     Health Maintenance   Topic Date Due    PT PLAN OF CARE  Never done    Cervical Cancer Screening  Never done    Colorectal Cancer Screening  Never done    COVID-19 Vaccine (3 - Booster for Pfizer series) 11/12/2021    Breast Cancer Screening: Mammogram  04/01/2022    Osteoporosis Screening  03/24/2029 (Originally 1/14/2014)    Lung Cancer Screening  10/20/2022    Depression Screening  03/25/2023    BMI: Adult  03/25/2023    Annual Physical  03/25/2023    DTaP,Tdap,and Td Vaccines (2 - Td or Tdap) 09/21/2026    Pneumococcal Vaccine: Pediatrics (0 to 5 Years) and At-Risk Patients (6 to 59 Years) (2 of 2 - PPSV23) 01/14/2029    HIV Screening  Completed    Hepatitis C Screening  Completed    Influenza Vaccine  Completed    HIB Vaccine  Aged Out    Hepatitis B Vaccine  Aged Out    IPV Vaccine  Aged Out    Hepatitis A Vaccine  Aged Out    Meningococcal ACWY Vaccine  Aged Out    HPV Vaccine  Aged Out     Return in about 1 year (around 3/25/2023) for Annual physical     Subjective:    CAMERON Brody is a 61 y o  female who presents today for a physical      Chief Complaint   Patient presents with    Physical Exam    LAB     done     hm     ok for all HM     Medication Refill     None at this time     PHQ-2/9 Depression Screening    Little interest or pleasure in doing things: 0 - not at all  Feeling down, depressed, or hopeless: 0 - not at all  PHQ-2 Score: 0  PHQ-2 Interpretation: Negative depression screen        ---Above per clinical staff & reviewed  ---  Patient here today for a physical:    Diet: eating well   Exercise:   Will be with the nice weather   Dental visits:  Dental visit due  Seatbelt: due     Concerns today:    Pap and colonoscopy due   Seeing GI for reflux   Getting symptoms maybe 2 days a week   Taking medicine daily   pulm concerned may be contributing to fibrosis   Gets hard ball in stomach sometimes and has to move around to get it to go away   Lasts about 5 minutes   Eats a lot of fiber  Moves bowels daily, soft, no pushing or straining  Feels well otherwise  Did start to smoke again but motivated to quit         The following portions of the patient's history were reviewed and updated as appropriate: allergies, current medications, past family history, past medical history, past social history, past surgical history and problem list      Current Medications:  Current Outpatient Medications   Medication Sig Dispense Refill    buPROPion (Wellbutrin SR) 150 mg 12 hr tablet Take 1 tablet (150 mg total) by mouth 2 (two) times a day 60 tablet 2    diclofenac sodium (VOLTAREN) 1 % Apply 2 g topically 4 (four) times a day      ergocalciferol (VITAMIN D2) 50,000 units Take 1 capsule (50,000 Units total) by mouth once a week for 12 doses 12 capsule 0    ibuprofen (MOTRIN) 600 mg tablet Take 1 tablet by mouth every 6 (six) hours as needed for mild pain or moderate pain for up to 30 days 10 tablet 0    Multiple Vitamin (MULTI-VITAMIN DAILY PO) Take 1 tablet by mouth daily      omeprazole (PriLOSEC) 20 mg delayed release capsule Take 1 capsule (20 mg total) by mouth daily 30 capsule 2    umeclidinium-vilanterol (Anoro Ellipta) 62 5-25 MCG/INH inhaler Inhale 1 puff daily 60 blister 5     No current facility-administered medications for this visit  Objective:      /84   Pulse 93   Temp 98 7 °F (37 1 °C)   Resp 16   Ht 5' 7" (1 702 m)   Wt 70 3 kg (155 lb)   SpO2 99%   BMI 24 28 kg/m²   BP Readings from Last 3 Encounters:   03/25/22 152/84   03/21/22 162/90   02/26/22 158/90     Wt Readings from Last 3 Encounters:   03/25/22 70 3 kg (155 lb)   03/21/22 70 3 kg (155 lb)   02/26/22 68 kg (150 lb)       Review of Systems  ROS:  all others negative - no chest pain, SOB, normal urine and bowels  + GERD  sleeping well  mood good  Physical Exam   Constitutional: she appears well-developed and well-nourished  HENT: Head: Normocephalic  Right Ear: External ear normal  Tympanic membrane normal    Left Ear: External ear normal  Tympanic membrane normal    Nose: Nose normal  No mucosal edema, No rhinorrhea  Right sinus exhibits no maxillary sinus tenderness  Left sinus exhibits no maxillary sinus tenderness  Mouth/Throat: Oropharynx is clear and moist    Eyes: Normal conjunctiva  No erythema  No discharge  Neck: No pain on exam  Neck supple  Cardiovascular: Normal rate, regular rhythm and normal heart sounds  Pulmonary/Chest: Effort normal and breath sounds normal  No wheezes  No rales  No rhonchi  Abdominal: Soft   Bowel sounds are normal  There is no tenderness  Musculoskeletal: she exhibits no edema  Lymphadenopathy: she has no cervical adenopathy  Neurological: she  is alert and oriented to person, place, and time  Skin: Skin is warm and dry  No rashes  Psychiatric: she  has a normal mood and affect  her behavior is normal  Thought content normal    Vitals reviewed  Depression Screening and Follow-up Plan: Patient was screened for depression during today's encounter  They screened negative with a PHQ-2 score of 0

## 2022-03-28 ENCOUNTER — HOSPITAL ENCOUNTER (OUTPATIENT)
Dept: CT IMAGING | Facility: HOSPITAL | Age: 57
Discharge: HOME | End: 2022-03-28
Attending: INTERNAL MEDICINE
Payer: COMMERCIAL

## 2022-03-28 DIAGNOSIS — R91.8 PULMONARY NODULES: ICD-10-CM

## 2022-03-28 PROCEDURE — 71250 CT THORAX DX C-: CPT

## 2022-03-30 ENCOUNTER — TELEPHONE (OUTPATIENT)
Dept: GASTROENTEROLOGY | Facility: CLINIC | Age: 57
End: 2022-03-30

## 2022-04-01 ENCOUNTER — TELEPHONE (OUTPATIENT)
Dept: PULMONOLOGY | Facility: CLINIC | Age: 57
End: 2022-04-01

## 2022-04-01 DIAGNOSIS — R91.8 PULMONARY NODULES: Primary | ICD-10-CM

## 2022-04-01 NOTE — TELEPHONE ENCOUNTER
Please let patient know that her CT findings including emphysema, scarring, and pulmonary nodules are all stable  Recommend repeat CT in 1 year that appears to be already scheduled by 1 of the other pulmonary advanced practitioners

## 2022-04-06 ENCOUNTER — TELEPHONE (OUTPATIENT)
Dept: GASTROENTEROLOGY | Facility: CLINIC | Age: 57
End: 2022-04-06

## 2022-04-12 ENCOUNTER — OFFICE VISIT (OUTPATIENT)
Dept: AUDIOLOGY | Facility: CLINIC | Age: 57
End: 2022-04-12
Payer: COMMERCIAL

## 2022-04-12 ENCOUNTER — OFFICE VISIT (OUTPATIENT)
Dept: OTOLARYNGOLOGY | Facility: CLINIC | Age: 57
End: 2022-04-12
Payer: COMMERCIAL

## 2022-04-12 VITALS
WEIGHT: 155 LBS | HEIGHT: 67 IN | SYSTOLIC BLOOD PRESSURE: 150 MMHG | BODY MASS INDEX: 24.33 KG/M2 | TEMPERATURE: 98.6 F | HEART RATE: 90 BPM | OXYGEN SATURATION: 96 % | DIASTOLIC BLOOD PRESSURE: 82 MMHG

## 2022-04-12 DIAGNOSIS — H90.3 SENSORY HEARING LOSS, BILATERAL: Primary | ICD-10-CM

## 2022-04-12 DIAGNOSIS — H69.82 EUSTACHIAN TUBE DYSFUNCTION, LEFT: ICD-10-CM

## 2022-04-12 DIAGNOSIS — H93.8X3 SENSATION OF PLUGGED EAR ON BOTH SIDES: ICD-10-CM

## 2022-04-12 DIAGNOSIS — L29.9 ITCHING OF EAR: Primary | ICD-10-CM

## 2022-04-12 DIAGNOSIS — S03.00XA DISLOCATION OF TEMPOROMANDIBULAR JOINT, INITIAL ENCOUNTER: ICD-10-CM

## 2022-04-12 PROCEDURE — 99204 OFFICE O/P NEW MOD 45 MIN: CPT | Performed by: OTOLARYNGOLOGY

## 2022-04-12 PROCEDURE — 92567 TYMPANOMETRY: CPT | Performed by: AUDIOLOGIST-HEARING AID FITTER

## 2022-04-12 PROCEDURE — 92557 COMPREHENSIVE HEARING TEST: CPT | Performed by: AUDIOLOGIST-HEARING AID FITTER

## 2022-04-12 RX ORDER — FLUOCINOLONE ACETONIDE 0.11 MG/ML
4 OIL AURICULAR (OTIC)
Qty: 20 ML | Refills: 1 | Status: SHIPPED | OUTPATIENT
Start: 2022-04-12

## 2022-04-12 NOTE — PROGRESS NOTES
Otolaryngology Clinic Visit  Name:  Stephanie Michael  MRN:  [id number]  Date:  4/12/2022 12:57 PM  ________________________________________________________________________       CHIEF COMPLAINT:   Ear check     HPI:  Stephanie Michael is a 61 y o  female with PMH as below who presents today for evaluation of ear check  Reports their ears have been feeling clogged  No signs symptoms of ear infections  No dizziness or vertigo  No ear issues in the past  No issues with their nose  Feels like there is water coming out of her ears  She does report a history of TMJ/bruxism but has been less lately  She does use qtips  No issues with her nose today  No other ENT questions or concerns  ++tobacco     PMHx:  Past Medical History:   Diagnosis Date    Arthritis     RIGHT KNEE     [facility]     DVT (deep venous thrombosis) ([facility])     Sept 2020    Emphysema of lung ([address] )     GERD (gastroesophageal reflux disease)     Gestational hypertension     Nerve pain     Pneumonia     2018    Pulmonary emphysema (HCC)     Thrombophlebitis     RIGHT LEG        PSHx:  Past Surgical History:   Procedure Laterality Date    APPENDECTOMY  1999    CHOLECYSTECTOMY      ENDOVASCULAR LASER THERAPY (EVLT)      Left    [place] ENDOVENOUS LASER, 1ST VEIN Left 3/12/2021    Procedure: ENDOVASCULAR LASER THERAPY (EVLT) + Stab Phlebectomy (10-20); Surgeon: Cherie Andersen MD;  [facility];  Service: Vascular    [place] ENDOVENOUS LASER, 1ST VEIN Right 6/18/2021    Procedure: ENDOVASCULAR LASER THERAPY (EVLT) R GSV EVLT + Stab Phlebectomy (10-20);   Surgeon: Cherie Andersen MD;  [facility];  Service: Vascular    [place] WRIST Colie Risen LIG Left 5/19/2017    Procedure: ENDOSCOPIC CARPAL TUNNEL RELEASE ;  Surgeon: Dorothy Schirmer, MD;  [facility];  Service: Orthopedics    TONSILLECTOMY         FAMHx:  Family History   Problem Relation Age of Onset    Cancer Father [de-identified] thinks colon cancer    Heart attack Father 76    Heart disease Father     Heart attack Mother     Thyroid disease Mother     Atrial fibrillation Mother         pacemaker    Heart disease Mother     Lung cancer Sister         (they share a mother only)     No Known Problems Brother     No Known Problems Daughter     Thyroid disease Maternal Grandmother     Coronary artery disease Paternal Grandfather     No Known Problems Daughter     No Known Problems Paternal Aunt        SOCHx:  Social History     Socioeconomic History    Marital status: Legally      Spouse name: None    Number of children: 2    Years of education: None    Highest education level: None   Occupational History    None   Tobacco Use    Smoking status: Former Smoker     Packs/day: 1 00     Years: 30 00     Pack years: 30 00     Types: Cigarettes     Start date: 1990     Quit date: 2020     Years since quittin 3    Smokeless tobacco: Never Used   Vaping Use    Vaping Use: Never used   Substance and Sexual Activity    Alcohol use: Not Currently     Alcohol/week: 7 0 standard drinks     Types: 7 Glasses of wine per week     Comment: Daily    Drug use: No    Sexual activity: Not Currently   Other Topics Concern    None   Social History Narrative    None     Social Determinants of Health     Financial Resource Strain: Not on file   Food Insecurity: Not on file   Transportation Needs: Not on file   Physical Activity: Not on file   Stress: Not on file   Social Connections: Not on file   Intimate Partner Violence: Not on file   Housing Stability: Not on file       Allergies:   Allergies   Allergen Reactions    Seasonal Ic  [Cholestatin]         MEDS:  Reviewed    ROS:  See MA note from same date     PHYSICAL EXAM:  /82 (BP Location: Left arm, Cuff Size: Standard)   Pulse 90   Temp 98 6 °F (37 °C) (Temporal)   Ht 5' 7" (1 702 m)   Wt 70 3 kg (155 lb)   SpO2 96%   BMI 24 28 kg/m²   General: NAD, AOx4  Eyes:  EOMI  Ears:  Right: ear canal normal, TM normal apperance, no fluid  Left: ear canal normal, TM normal apperance, no fluid  Both ears are over cleaned  Nasal: No external deformity   Oral cavity:  Moderate wear facets, no crepitus   Neck: Unremarkable  Lymph:  Unremarkable  Skin:  No obvious facial lesions  Neuro: Face symmetrical, no obvious cranial nerve palsy  No focal deficits   Lungs:  Normal work of breathing, symmetrical chest expansion  Vascular: Well perfused      Procedures:  None    Medical Data Reviewed:  Records reviewed and summarized as in EPIC  Audiogram from Today Normal  Tympanogram from Today type A  Reviewed personally with patient and audiology    Radiology:  None    Labs:  None     Patient Active Problem List   Diagnosis    Patellofemoral arthritis of left knee    Thrombophlebitis of saphenous vein, right    Varicose veins of both lower extremities with inflammation    Cigarette nicotine dependence without complication    Menopause    Vitamin D deficiency    Abnormal chest CT    Pulmonary fibrosis ([address] )    Centrilobular emphysema ([address] )    Pulmonary nodules    Gastroesophageal reflux disease without esophagitis    Smoker       ASSESSMENT/PLAN:  Stephanie Michael is a 61 y o  female with acute and chronic problems as above who presents with:    1  Itching of ear    2  Eustachian tube dysfunction, left    3  Sensation of plugged ear on both sides    4  Dislocation of temporomandibular joint, initial encounter        61 y o  here today for further evaluation of clogged ears  Her examination today is largely normal with the exception of over cleaned ears  We reviewed ear care and hygiene and will give her a trial of Dermotic to see how that clears up her ears  The next most likely cause is her TMJ, which is not too troublesome today  She additionally has some head fullness/dizzy sensation which she will continue to watch  No signs of peripheral vertigo today    -Dermotic Q    RTC 2 months         Radha Dallas MD MPH  Otolaryngology--Head and Neck Surgery  [facility]  4/12/2022 12:57 PM

## 2022-04-12 NOTE — PROGRESS NOTES
ENT HEARING EVALUATION    Name:  Jack Doll  :  1965  Age:  61 y o  Date of Evaluation: 22     History: ENT Audiogram  Reason for visit: Jack Doll is being seen today at the request of Dr Miladys Herrera for an evaluation of hearing as part of their initial ENT visit  EVALUATION:    Tympanometry:   Right: Type Ad - hypermobile compliance   Left: Type Ad - hypermobile compliance    Audiogram Results:  Pure tone testing revealed normal hearing sensitivity bilaterally  SRT and PTA are in agreement indicating good test reliability  Word recognition scores were  excellent bilaterally      *see attached audiogram      RECOMMENDATIONS:  Consult ENT and Return to [facility]  for F/U      Jaci Ibarra   Clinical Audiologist

## 2022-04-14 ENCOUNTER — APPOINTMENT (OUTPATIENT)
Dept: LAB | Facility: MEDICAL CENTER | Age: 57
End: 2022-04-14
Payer: COMMERCIAL

## 2022-04-14 DIAGNOSIS — E55.9 VITAMIN D DEFICIENCY: ICD-10-CM

## 2022-04-14 DIAGNOSIS — K21.9 GASTROESOPHAGEAL REFLUX DISEASE WITHOUT ESOPHAGITIS: ICD-10-CM

## 2022-04-14 DIAGNOSIS — J84.10 PULMONARY FIBROSIS (HCC): ICD-10-CM

## 2022-04-14 LAB
25(OH)D3 SERPL-MCNC: 30.7 NG/ML (ref 30–100)
ALBUMIN SERPL BCP-MCNC: 4.3 G/DL (ref 3.5–5)
ALP SERPL-CCNC: 83 U/L (ref 46–116)
ALT SERPL W P-5'-P-CCNC: 21 U/L (ref 12–78)
ANION GAP SERPL CALCULATED.3IONS-SCNC: 3 MMOL/L (ref 4–13)
AST SERPL W P-5'-P-CCNC: 14 U/L (ref 5–45)
BASOPHILS # BLD AUTO: 0.07 THOUSANDS/ΜL (ref 0–0.1)
BASOPHILS NFR BLD AUTO: 1 % (ref 0–1)
BILIRUB SERPL-MCNC: 0.73 MG/DL (ref 0.2–1)
BUN SERPL-MCNC: 12 MG/DL (ref 5–25)
CALCIUM SERPL-MCNC: 9.6 MG/DL (ref 8.3–10.1)
CHLORIDE SERPL-SCNC: 108 MMOL/L (ref 100–108)
CO2 SERPL-SCNC: 30 MMOL/L (ref 21–32)
CREAT SERPL-MCNC: 0.65 MG/DL (ref 0.6–1.3)
EOSINOPHIL # BLD AUTO: 0.3 THOUSAND/ΜL (ref 0–0.61)
EOSINOPHIL NFR BLD AUTO: 3 % (ref 0–6)
ERYTHROCYTE [DISTWIDTH] IN BLOOD BY AUTOMATED COUNT: 13.2 % (ref 11.6–15.1)
GFR SERPL CREATININE-BSD FRML MDRD: 98 ML/MIN/1.73SQ M
GLUCOSE SERPL-MCNC: 104 MG/DL (ref 65–140)
HCT VFR BLD AUTO: 44.8 % (ref 34.8–46.1)
HGB BLD-MCNC: 14.4 G/DL (ref 11.5–15.4)
IMM GRANULOCYTES # BLD AUTO: 0.04 THOUSAND/UL (ref 0–0.2)
IMM GRANULOCYTES NFR BLD AUTO: 0 % (ref 0–2)
LYMPHOCYTES # BLD AUTO: 2.56 THOUSANDS/ΜL (ref 0.6–4.47)
LYMPHOCYTES NFR BLD AUTO: 25 % (ref 14–44)
MCH RBC QN AUTO: 29.3 PG (ref 26.8–34.3)
MCHC RBC AUTO-ENTMCNC: 32.1 G/DL (ref 31.4–37.4)
MCV RBC AUTO: 91 FL (ref 82–98)
MONOCYTES # BLD AUTO: 0.82 THOUSAND/ΜL (ref 0.17–1.22)
MONOCYTES NFR BLD AUTO: 8 % (ref 4–12)
NEUTROPHILS # BLD AUTO: 6.52 THOUSANDS/ΜL (ref 1.85–7.62)
NEUTS SEG NFR BLD AUTO: 63 % (ref 43–75)
NRBC BLD AUTO-RTO: 0 /100 WBCS
PLATELET # BLD AUTO: 376 THOUSANDS/UL (ref 149–390)
PMV BLD AUTO: 10.4 FL (ref 8.9–12.7)
POTASSIUM SERPL-SCNC: 4.5 MMOL/L (ref 3.5–5.3)
PROT SERPL-MCNC: 7.1 G/DL (ref 6.4–8.2)
RBC # BLD AUTO: 4.92 MILLION/UL (ref 3.81–5.12)
SODIUM SERPL-SCNC: 141 MMOL/L (ref 136–145)
WBC # BLD AUTO: 10.31 THOUSAND/UL (ref 4.31–10.16)

## 2022-04-14 PROCEDURE — 80053 COMPREHEN METABOLIC PANEL: CPT

## 2022-04-14 PROCEDURE — 82306 VITAMIN D 25 HYDROXY: CPT

## 2022-04-14 PROCEDURE — 36415 COLL VENOUS BLD VENIPUNCTURE: CPT

## 2022-04-14 PROCEDURE — 85025 COMPLETE CBC W/AUTO DIFF WBC: CPT

## 2022-05-19 DIAGNOSIS — J43.2 CENTRILOBULAR EMPHYSEMA (HCC): ICD-10-CM

## 2022-05-20 RX ORDER — UMECLIDINIUM BROMIDE AND VILANTEROL TRIFENATATE 62.5; 25 UG/1; UG/1
1 POWDER RESPIRATORY (INHALATION) DAILY
Qty: 60 BLISTER | Refills: 5 | Status: SHIPPED | OUTPATIENT
Start: 2022-05-20

## 2022-06-07 ENCOUNTER — VBI (OUTPATIENT)
Dept: ADMINISTRATIVE | Facility: OTHER | Age: 57
End: 2022-06-07

## 2022-12-02 DIAGNOSIS — J84.10 PULMONARY FIBROSIS (HCC): Primary | ICD-10-CM

## 2022-12-02 RX ORDER — UMECLIDINIUM BROMIDE AND VILANTEROL TRIFENATATE 62.5; 25 UG/1; UG/1
1 POWDER RESPIRATORY (INHALATION) DAILY
Qty: 60 BLISTER | Refills: 8 | Status: SHIPPED | OUTPATIENT
Start: 2022-12-02 | End: 2023-01-01

## 2023-02-09 ENCOUNTER — VBI (OUTPATIENT)
Dept: ADMINISTRATIVE | Facility: OTHER | Age: 58
End: 2023-02-09

## 2023-08-28 ENCOUNTER — VBI (OUTPATIENT)
Dept: ADMINISTRATIVE | Facility: OTHER | Age: 58
End: 2023-08-28

## 2023-08-30 NOTE — ANESTHESIA PREPROCEDURE EVALUATION
Procedure:  ENDOVASCULAR LASER THERAPY (EVLT) + Stab Phlebectomy (10-20) (Left Leg Upper)    Relevant Problems   GI/HEPATIC   (+) Gastroesophageal reflux disease without esophagitis      MUSCULOSKELETAL   (+) Patellofemoral arthritis of left knee      PULMONARY   (+) Centrilobular emphysema (HCC)      Other   (+) Abnormal chest CT   (+) Cigarette nicotine dependence in remission   (+) Pulmonary nodules   1/4/21  LEFT VENTRICLE:  Systolic function was normal  Ejection fraction was estimated to be 55 %  There were no regional wall motion abnormalities      RIGHT VENTRICLE:  The size was normal   Systolic function was normal         Physical Exam    Airway    Mallampati score: II  TM Distance: >3 FB  Neck ROM: full     Dental   No notable dental hx     Cardiovascular      Pulmonary  Breath sounds clear to auscultation,     Other Findings        Anesthesia Plan  ASA Score- 3     Anesthesia Type- general with ASA Monitors  Additional Monitors:   Airway Plan: LMA  Plan Factors-Exercise tolerance (METS): >4 METS  Chart reviewed  Patient is not a current smoker  Patient not instructed to abstain from smoking on day of procedure  Patient did not smoke on day of surgery  Induction- intravenous  Postoperative Plan-     Informed Consent- Anesthetic plan and risks discussed with patient  I personally reviewed this patient with the CRNA  Discussed and agreed on the Anesthesia Plan with the CRNA  Elier Miller
PAST MEDICAL HISTORY:  Hypothyroidism     OA (osteoarthritis)     Polyp of corpus uteri     Postmenopausal bleeding     Prediabetes

## 2023-09-06 ENCOUNTER — VBI (OUTPATIENT)
Dept: ADMINISTRATIVE | Facility: OTHER | Age: 58
End: 2023-09-06

## 2023-11-11 DIAGNOSIS — J84.10 PULMONARY FIBROSIS (HCC): ICD-10-CM

## 2023-11-13 RX ORDER — UMECLIDINIUM BROMIDE AND VILANTEROL TRIFENATATE 62.5; 25 UG/1; UG/1
1 POWDER RESPIRATORY (INHALATION) DAILY
Qty: 60 EACH | Refills: 0 | Status: SHIPPED | OUTPATIENT
Start: 2023-11-13

## 2023-11-25 ENCOUNTER — OFFICE VISIT (OUTPATIENT)
Dept: URGENT CARE | Facility: MEDICAL CENTER | Age: 58
End: 2023-11-25
Payer: COMMERCIAL

## 2023-11-25 VITALS
TEMPERATURE: 98.9 F | SYSTOLIC BLOOD PRESSURE: 122 MMHG | BODY MASS INDEX: 23.54 KG/M2 | RESPIRATION RATE: 20 BRPM | WEIGHT: 150 LBS | HEIGHT: 67 IN | DIASTOLIC BLOOD PRESSURE: 70 MMHG | HEART RATE: 75 BPM | OXYGEN SATURATION: 97 %

## 2023-11-25 DIAGNOSIS — J30.2 SEASONAL ALLERGIC RHINITIS, UNSPECIFIED TRIGGER: Primary | ICD-10-CM

## 2023-11-25 PROCEDURE — 99213 OFFICE O/P EST LOW 20 MIN: CPT | Performed by: STUDENT IN AN ORGANIZED HEALTH CARE EDUCATION/TRAINING PROGRAM

## 2023-11-25 NOTE — PROGRESS NOTES
[facility]        NAME: Samra Torres is a 60 y.o. female  : 1965    MRN: [id number]    Assessment and Plan   Seasonal allergic rhinitis, unspecified trigger [J30.2]  1. Seasonal allergic rhinitis, unspecified trigger          Start claritin and flonase BID for 1 week and then daily. No concern for infectious etiology. No concern for exacerbation of IPF or COPD as pulm exam normal and vitals normal without signs of any respiratory distress. Patient Instructions       Follow up with PCP in 3-5 days. Proceed to  ER if symptoms worsen. Chief Complaint     Chief Complaint   Patient presents with    Cold Like Symptoms     Cough and chest congestion , shortness of breath , home covid test done with negative results          History of Present Illness       HPI    P/w onset of symptoms since mid October- cough and congestion. They improve but then recur. Denies being evaluated for this since onset. Has been trying OTC expectorants, tea with honey, cool mist without significant relief. Reports hx of allergies which she manages with claritin but not taking right now. Hx of pulm fibrosis and COPD, on daily anoro. Review of Systems   Review of Systems   Constitutional:  Negative for chills and fever. HENT:  Positive for congestion. Negative for ear pain and sore throat. Eyes:  Negative for pain and visual disturbance. Respiratory:  Positive for cough. Negative for chest tightness and shortness of breath. Cardiovascular:  Negative for chest pain and palpitations. Gastrointestinal:  Negative for abdominal pain, constipation, diarrhea, nausea and vomiting. Genitourinary:  Negative for dysuria, hematuria and menstrual problem. Musculoskeletal:  Negative for arthralgias and back pain. Skin:  Negative for color change and rash. Neurological:  Negative for seizures and syncope. Psychiatric/Behavioral:  Negative for dysphoric mood and suicidal ideas.     All other systems reviewed and are negative.     Current Medications       Current Outpatient Medications:     Anoro Ellipta 62.5-25 MCG/ACT inhaler, Inhale 1 puff by mouth once daily, Disp: 60 each, Rfl: 0    buPROPion (Wellbutrin SR) 150 mg 12 hr tablet, Take 1 tablet (150 mg total) by mouth 2 (two) times a day, Disp: 60 tablet, Rfl: 2    diclofenac sodium (VOLTAREN) 1 %, Apply 2 g topically 4 (four) times a day, Disp: , Rfl:     fluocinolone acetonide (DermOtic) 0.01 % otic oil, Administer 4 drops into both ears daily at bedtime, Disp: 20 mL, Rfl: 1    Multiple Vitamin (MULTI-VITAMIN DAILY PO), Take 1 tablet by mouth daily, Disp: , Rfl:     omeprazole (PriLOSEC) 20 mg delayed release capsule, Take 1 capsule (20 mg total) by mouth daily, Disp: 30 capsule, Rfl: 2    umeclidinium-vilanterol (Anoro Ellipta) 62.5-25 MCG/INH inhaler, Inhale 1 puff  in the morning., Disp: 60 blister, Rfl: 5    ergocalciferol (VITAMIN D2) 50,000 units, Take 1 capsule (50,000 Units total) by mouth once a week for 12 doses, Disp: 12 capsule, Rfl: 0    ibuprofen (MOTRIN) 600 mg tablet, Take 1 tablet by mouth every 6 (six) hours as needed for mild pain or moderate pain for up to 30 days, Disp: 10 tablet, Rfl: 0    Current Allergies     Allergies as of 11/25/2023 - Reviewed 11/25/2023   Allergen Reaction Noted    Seasonal ic  [cholestatin]  09/21/2016            The following portions of the patient's history were reviewed and updated as appropriate: allergies, current medications, past family history, past medical history, past social history, past surgical history and problem list.     Past Medical History:   Diagnosis Date    Arthritis     RIGHT KNEE     COPD (chronic obstructive pulmonary disease) ([address])     DVT (deep venous thrombosis) ([address])     Sept 2020    Emphysema of lung ([address])     GERD (gastroesophageal reflux disease)     Gestational hypertension     Nerve pain     Pneumonia     2018    Pulmonary emphysema (HCC)     Thrombophlebitis     RIGHT LEG        Past Surgical History:   Procedure Laterality Date    APPENDECTOMY  1999    CHOLECYSTECTOMY      ENDOVASCULAR LASER THERAPY (EVLT)      Left    [place] ENDOVEN ABLTJ INCMPTNT VEIN XTR LASER 1ST VEIN Left 3/12/2021    Procedure: ENDOVASCULAR LASER THERAPY (EVLT) + Stab Phlebectomy (10-20); Surgeon: Arjun Bob MD;  [facility]: [facility];  Service: Vascular    [place] ENDOVEN ABLTJ INCMPTNT VEIN XTR LASER 1ST VEIN Right 6/18/2021    Procedure: ENDOVASCULAR LASER THERAPY (EVLT) R GSV EVLT + Stab Phlebectomy (10-20); Surgeon: Arjun Bob MD;  Location: [facility];  Service: Vascular    [place] [id number] WRST SURG W/RLS TRANSVRS CARPL LIGM Left 5/19/2017    Procedure: ENDOSCOPIC CARPAL TUNNEL RELEASE ;  Surgeon: Silas Parsons MD;  Location[facility];  Service: Orthopedics    TONSILLECTOMY         Family History   Problem Relation Age of Onset    Cancer Father 80        thinks colon cancer    Heart attack Father 76    Heart disease Father     Heart attack Mother     Thyroid disease Mother     Atrial fibrillation Mother         pacemaker    Heart disease Mother     Lung cancer Sister         (they share a mother only)     No Known Problems Brother     No Known Problems Daughter     Thyroid disease Maternal Grandmother     Coronary artery disease Paternal Grandfather     No Known Problems Daughter     No Known Problems Paternal Aunt          Medications have been verified. Objective   /70   Pulse 75   Temp 98.9 °F (37.2 °C)   Resp 20   Ht 5' 7" (1.702 m)   Wt 68 kg (150 lb)   SpO2 97%   BMI 23.49 kg/m²        Physical Exam     Physical Exam  Constitutional:       General: She is not in acute distress. Appearance: Normal appearance. HENT:      Head: Normocephalic and atraumatic. Right Ear: Tympanic membrane, ear canal and external ear normal. There is no impacted cerumen. Left Ear: Tympanic membrane, ear canal and external ear normal. There is no impacted cerumen.       Nose: Congestion present. Mouth/Throat:      Mouth: Mucous membranes are moist.      Pharynx: Oropharynx is clear. No oropharyngeal exudate or posterior oropharyngeal erythema. Eyes:      General: No scleral icterus. Right eye: No discharge. Left eye: No discharge. Extraocular Movements: Extraocular movements intact. Conjunctiva/sclera: Conjunctivae normal.   Cardiovascular:      Rate and Rhythm: Normal rate. Pulmonary:      Effort: Pulmonary effort is normal. No respiratory distress. Breath sounds: No stridor. No wheezing, rhonchi or rales. Musculoskeletal:      Cervical back: Normal range of motion. Lymphadenopathy:      Cervical: No cervical adenopathy. Skin:     General: Skin is warm and dry. Neurological:      General: No focal deficit present. Mental Status: She is alert and oriented to person, place, and time.    Psychiatric:         Mood and Affect: Mood normal.         Behavior: Behavior normal.

## 2023-12-08 ENCOUNTER — HOSPITAL ENCOUNTER (OUTPATIENT)
Dept: CT IMAGING | Facility: HOSPITAL | Age: 58
Discharge: HOME | End: 2023-12-08
Payer: COMMERCIAL

## 2023-12-08 DIAGNOSIS — R91.8 PULMONARY NODULES: ICD-10-CM

## 2023-12-08 PROCEDURE — 71250 CT THORAX DX C-: CPT

## 2023-12-08 PROCEDURE — G1004 CDSM NDSC: HCPCS

## 2023-12-09 ENCOUNTER — APPOINTMENT (EMERGENCY)
Dept: CT IMAGING | Facility: HOSPITAL | Age: 58
End: 2023-12-09
Payer: COMMERCIAL

## 2023-12-09 ENCOUNTER — HOSPITAL ENCOUNTER (EMERGENCY)
Facility: HOSPITAL | Age: 58
Discharge: HOME | End: 2023-12-09
Attending: EMERGENCY MEDICINE
Payer: COMMERCIAL

## 2023-12-09 VITALS
RESPIRATION RATE: 15 BRPM | DIASTOLIC BLOOD PRESSURE: 81 MMHG | HEART RATE: 70 BPM | OXYGEN SATURATION: 96 % | SYSTOLIC BLOOD PRESSURE: 151 MMHG | TEMPERATURE: 98.7 F

## 2023-12-09 DIAGNOSIS — I10 HYPERTENSION: ICD-10-CM

## 2023-12-09 DIAGNOSIS — J40 BRONCHITIS: Primary | ICD-10-CM

## 2023-12-09 DIAGNOSIS — R06.00 DYSPNEA: ICD-10-CM

## 2023-12-09 LAB
2HR DELTA HS TROPONIN: 0 NG/L
ALBUMIN SERPL BCP-MCNC: 4.7 G/DL (ref 3.5–5)
ALP SERPL-CCNC: 66 U/L (ref 34–104)
ALT SERPL W P-5'-P-CCNC: 14 U/L (ref 7–52)
ANION GAP SERPL CALCULATED.3IONS-SCNC: 8 MMOL/L
APTT PPP: 27 SECONDS (ref 23–37)
AST SERPL W P-5'-P-CCNC: 15 U/L (ref 13–39)
ATRIAL RATE: 80 BPM
BASOPHILS # BLD AUTO: 0.05 THOUSANDS/ÂΜL (ref 0–0.1)
BASOPHILS NFR BLD AUTO: 1 % (ref 0–1)
BILIRUB SERPL-MCNC: 0.41 MG/DL (ref 0.2–1)
BNP SERPL-MCNC: 56 PG/ML (ref 0–100)
BUN SERPL-MCNC: 14 MG/DL (ref 5–25)
CALCIUM SERPL-MCNC: 9.8 MG/DL (ref 8.4–10.2)
CARDIAC TROPONIN I PNL SERPL HS: 4 NG/L
CARDIAC TROPONIN I PNL SERPL HS: 4 NG/L
CHLORIDE SERPL-SCNC: 104 MMOL/L (ref 96–108)
CO2 SERPL-SCNC: 28 MMOL/L (ref 21–32)
CREAT SERPL-MCNC: 0.95 MG/DL (ref 0.6–1.3)
D DIMER PPP FEU-MCNC: 1.17 UG/ML FEU
EOSINOPHIL # BLD AUTO: 0.25 THOUSAND/ÂΜL (ref 0–0.61)
EOSINOPHIL NFR BLD AUTO: 2 % (ref 0–6)
ERYTHROCYTE [DISTWIDTH] IN BLOOD BY AUTOMATED COUNT: 12.8 % (ref 11.6–15.1)
FLUAV RNA RESP QL NAA+PROBE: NEGATIVE
FLUBV RNA RESP QL NAA+PROBE: NEGATIVE
GFR SERPL CREATININE-BSD FRML MDRD: 65 ML/MIN/1.73SQ M
GLUCOSE SERPL-MCNC: 111 MG/DL (ref 65–140)
HCT VFR BLD AUTO: 44.8 % (ref 34.8–46.1)
HGB BLD-MCNC: 14.7 G/DL (ref 11.5–15.4)
IMM GRANULOCYTES # BLD AUTO: 0.02 THOUSAND/UL (ref 0–0.2)
IMM GRANULOCYTES NFR BLD AUTO: 0 % (ref 0–2)
INR PPP: 0.99 (ref 0.84–1.19)
LYMPHOCYTES # BLD AUTO: 2.86 THOUSANDS/ÂΜL (ref 0.6–4.47)
LYMPHOCYTES NFR BLD AUTO: 27 % (ref 14–44)
MAGNESIUM SERPL-MCNC: 2.1 MG/DL (ref 1.9–2.7)
MCH RBC QN AUTO: 29.6 PG (ref 26.8–34.3)
MCHC RBC AUTO-ENTMCNC: 32.8 G/DL (ref 31.4–37.4)
MCV RBC AUTO: 90 FL (ref 82–98)
MONOCYTES # BLD AUTO: 0.68 THOUSAND/ÂΜL (ref 0.17–1.22)
MONOCYTES NFR BLD AUTO: 6 % (ref 4–12)
NEUTROPHILS # BLD AUTO: 6.7 THOUSANDS/ÂΜL (ref 1.85–7.62)
NEUTS SEG NFR BLD AUTO: 64 % (ref 43–75)
NRBC BLD AUTO-RTO: 0 /100 WBCS
P AXIS: 47 DEGREES
PLATELET # BLD AUTO: 352 THOUSANDS/UL (ref 149–390)
PMV BLD AUTO: 9.5 FL (ref 8.9–12.7)
POTASSIUM SERPL-SCNC: 4 MMOL/L (ref 3.5–5.3)
PR INTERVAL: 116 MS
PROT SERPL-MCNC: 7.1 G/DL (ref 6.4–8.4)
PROTHROMBIN TIME: 13 SECONDS (ref 11.6–14.5)
QRS AXIS: 85 DEGREES
QRSD INTERVAL: 88 MS
QT INTERVAL: 360 MS
QTC INTERVAL: 415 MS
RBC # BLD AUTO: 4.96 MILLION/UL (ref 3.81–5.12)
RSV RNA RESP QL NAA+PROBE: NEGATIVE
SARS-COV-2 RNA RESP QL NAA+PROBE: NEGATIVE
SODIUM SERPL-SCNC: 140 MMOL/L (ref 135–147)
T WAVE AXIS: 74 DEGREES
VENTRICULAR RATE: 80 BPM
WBC # BLD AUTO: 10.56 THOUSAND/UL (ref 4.31–10.16)

## 2023-12-09 PROCEDURE — 80053 COMPREHEN METABOLIC PANEL: CPT | Performed by: PHYSICIAN ASSISTANT

## 2023-12-09 PROCEDURE — 36415 COLL VENOUS BLD VENIPUNCTURE: CPT | Performed by: PHYSICIAN ASSISTANT

## 2023-12-09 PROCEDURE — 0241U HB NFCT DS VIR RESP RNA 4 TRGT: CPT | Performed by: PHYSICIAN ASSISTANT

## 2023-12-09 PROCEDURE — 83880 ASSAY OF NATRIURETIC PEPTIDE: CPT | Performed by: PHYSICIAN ASSISTANT

## 2023-12-09 PROCEDURE — 85379 FIBRIN DEGRADATION QUANT: CPT | Performed by: PHYSICIAN ASSISTANT

## 2023-12-09 PROCEDURE — 94640 AIRWAY INHALATION TREATMENT: CPT

## 2023-12-09 PROCEDURE — 83735 ASSAY OF MAGNESIUM: CPT | Performed by: PHYSICIAN ASSISTANT

## 2023-12-09 PROCEDURE — 93005 ELECTROCARDIOGRAM TRACING: CPT

## 2023-12-09 PROCEDURE — 84484 ASSAY OF TROPONIN QUANT: CPT | Performed by: PHYSICIAN ASSISTANT

## 2023-12-09 PROCEDURE — G1004 CDSM NDSC: HCPCS

## 2023-12-09 PROCEDURE — 85025 COMPLETE CBC W/AUTO DIFF WBC: CPT | Performed by: PHYSICIAN ASSISTANT

## 2023-12-09 PROCEDURE — 99285 EMERGENCY DEPT VISIT HI MDM: CPT

## 2023-12-09 PROCEDURE — 71275 CT ANGIOGRAPHY CHEST: CPT

## 2023-12-09 PROCEDURE — 99285 EMERGENCY DEPT VISIT HI MDM: CPT | Performed by: PHYSICIAN ASSISTANT

## 2023-12-09 PROCEDURE — 85730 THROMBOPLASTIN TIME PARTIAL: CPT | Performed by: PHYSICIAN ASSISTANT

## 2023-12-09 PROCEDURE — 85610 PROTHROMBIN TIME: CPT | Performed by: PHYSICIAN ASSISTANT

## 2023-12-09 RX ORDER — PREDNISONE 20 MG/1
40 TABLET ORAL DAILY
Qty: 10 TABLET | Refills: 0 | Status: SHIPPED | OUTPATIENT
Start: 2023-12-10 | End: 2023-12-15

## 2023-12-09 RX ORDER — DOXYCYCLINE HYCLATE 100 MG/1
100 CAPSULE ORAL ONCE
Status: COMPLETED | OUTPATIENT
Start: 2023-12-09 | End: 2023-12-09

## 2023-12-09 RX ORDER — PREDNISONE 20 MG/1
40 TABLET ORAL ONCE
Status: COMPLETED | OUTPATIENT
Start: 2023-12-09 | End: 2023-12-09

## 2023-12-09 RX ORDER — IPRATROPIUM BROMIDE AND ALBUTEROL SULFATE 2.5; .5 MG/3ML; MG/3ML
3 SOLUTION RESPIRATORY (INHALATION) ONCE
Status: COMPLETED | OUTPATIENT
Start: 2023-12-09 | End: 2023-12-09

## 2023-12-09 RX ORDER — ALBUTEROL SULFATE 90 UG/1
1-2 AEROSOL, METERED RESPIRATORY (INHALATION) EVERY 4 HOURS PRN
Qty: 8 G | Refills: 0 | Status: SHIPPED | OUTPATIENT
Start: 2023-12-09

## 2023-12-09 RX ORDER — DOXYCYCLINE HYCLATE 100 MG/1
100 CAPSULE ORAL 2 TIMES DAILY
Qty: 14 CAPSULE | Refills: 0 | Status: SHIPPED | OUTPATIENT
Start: 2023-12-09 | End: 2023-12-16

## 2023-12-09 RX ADMIN — IOHEXOL 85 ML: 350 INJECTION, SOLUTION INTRAVENOUS at 16:34

## 2023-12-09 RX ADMIN — PREDNISONE 40 MG: 20 TABLET ORAL at 18:39

## 2023-12-09 RX ADMIN — DOXYCYCLINE HYCLATE 100 MG: 100 CAPSULE ORAL at 18:39

## 2023-12-09 RX ADMIN — IPRATROPIUM BROMIDE AND ALBUTEROL SULFATE 3 ML: 2.5; .5 SOLUTION RESPIRATORY (INHALATION) at 15:54

## 2023-12-09 NOTE — ED PROVIDER NOTES
History  Chief Complaint   Patient presents with    Shortness of Breath     SOB, congestion, L ear and jaw pain, chest heaviness. Seen by PCP for same. "She tells me it's just allergies but this isn't allergies. I have drainage and it tastes like rotten meet. My ear, I woke up one morning and I heard a gush of water in my ear. I feel like my equilibrium is off."      60year old female with PMH emphysema, GERD, HTN, h/o DVT in lower extremity not currently on anticoagulation presenting ambulatory from home for evaluation of shortness of breath. Pt reports she began feeling bad a few weeks ago. She was seen at urgent care when her symptoms started. She states she's had congestion. She reports associated sore throat. She notes minimal cough, states she is not able to bring anything up. It has been non productive. Denies fever, chills. She reports she has drainage in her throat that "tastes like rotten meat". She states she was told it was allergies but states this doesn't feel like allergies. Denies chest pain but states her chest feels tight. She reports she feels short of breath. This is worse with activity. Denies N/V, abdominal pain. She states she had some diarrhea today. She reports feeling lightheaded at times. No syncope. Denies headache, visual disturbance. Denies numbness, tingling, weakness. No difficulty with speech. No reported alleviating factors. She states she does follow with pulmonary due to history of scarring on her lung and lung nodules. She uses anoro inhaler daily. She is a former smoker. Denies leg pain or swelling. She notes history of DVT in the lower leg, was on anticoagulation for a period of time then stopped. She notes multiple sick contacts to include her grandkids and co-workers.       History provided by:  Patient and medical records   used: No    Shortness of Breath  Chronicity:  New  Context: URI    Relieved by:  Nothing  Worsened by: Activity  Ineffective treatments:  Inhaler and rest  Associated symptoms: chest pain, ear pain and sore throat    Associated symptoms: no abdominal pain, no cough, no diaphoresis, no fever, no headaches, no hemoptysis, no neck pain, no rash, no sputum production, no syncope, no vomiting and no wheezing    Risk factors: hx of PE/DVT and tobacco use (former)    Risk factors: no hx of cancer, no oral contraceptive use, no prolonged immobilization and no recent surgery        Prior to Admission Medications   Prescriptions Last Dose Informant Patient Reported? Taking? Anoro Ellipta 62.5-25 MCG/ACT inhaler   No No   Sig: Inhale 1 puff by mouth once daily   Multiple Vitamin (MULTI-VITAMIN DAILY PO)  Self Yes No   Sig: Take 1 tablet by mouth daily   buPROPion (Wellbutrin SR) 150 mg 12 hr tablet  Self No No   Sig: Take 1 tablet (150 mg total) by mouth 2 (two) times a day   diclofenac sodium (VOLTAREN) 1 %  Self Yes No   Sig: Apply 2 g topically 4 (four) times a day   ergocalciferol (VITAMIN D2) 50,000 units  Self No No   Sig: Take 1 capsule (50,000 Units total) by mouth once a week for 12 doses   fluocinolone acetonide (DermOtic) 0.01 % otic oil   No No   Sig: Administer 4 drops into both ears daily at bedtime   ibuprofen (MOTRIN) 600 mg tablet  Self No No   Sig: Take 1 tablet by mouth every 6 (six) hours as needed for mild pain or moderate pain for up to 30 days   omeprazole (PriLOSEC) 20 mg delayed release capsule  Self No No   Sig: Take 1 capsule (20 mg total) by mouth daily   umeclidinium-vilanterol (Anoro Ellipta) 62.5-25 MCG/INH inhaler   No No   Sig: Inhale 1 puff  in the morning.       Facility-Administered Medications: None       Past Medical History:   Diagnosis Date    Arthritis     RIGHT KNEE     COPD (chronic obstructive pulmonary disease) (HCC)     DVT (deep venous thrombosis) ([address])     Sept 2020    Emphysema of lung ([address])     GERD (gastroesophageal reflux disease)     Gestational hypertension     Nerve pain Pneumonia     2018    Pulmonary emphysema (HCC)     Thrombophlebitis     RIGHT LEG        Past Surgical History:   Procedure Laterality Date    APPENDECTOMY      CHOLECYSTECTOMY      ENDOVASCULAR LASER THERAPY (EVLT)      Left    [place] ENDOVEN ABLTJ INCMPTNT VEIN XTR LASER 1ST VEIN Left 3/12/2021    Procedure: ENDOVASCULAR LASER THERAPY (EVLT) + Stab Phlebectomy (10-20); Surgeon: Cristopher Dean MD;  [facility];  Service: Vascular    [place] ENDOVEN ABLTJ INCMPTNT VEIN XTR LASER 1ST VEIN Right 2021    Procedure: ENDOVASCULAR LASER THERAPY (EVLT) R GSV EVLT + Stab Phlebectomy (10-20); Surgeon: Cristopher Dean MD;  Location[facility];  Service: Vascular    [place] [address] WRST SURG W/RLS TRANSVRS CARPL LIGM Left 2017    Procedure: ENDOSCOPIC CARPAL TUNNEL RELEASE ;  Surgeon: Naida Hernandez MD;  Location[facility];  Service: Orthopedics    TONSILLECTOMY         Family History   Problem Relation Age of Onset    Cancer Father 80        thinks colon cancer    Heart attack Father 76    Heart disease Father     Heart attack Mother     Thyroid disease Mother     Atrial fibrillation Mother         pacemaker    Heart disease Mother     Lung cancer Sister         (they share a mother only)     No Known Problems Brother     No Known Problems Daughter     Thyroid disease Maternal Grandmother     Coronary artery disease Paternal Grandfather     No Known Problems Daughter     No Known Problems Paternal Aunt      I have reviewed and agree with the history as documented.     E-Cigarette/Vaping    E-Cigarette Use Never User      E-Cigarette/Vaping Substances    Nicotine No     THC No     CBD No     Flavoring No     Other No     Unknown No      Social History     Tobacco Use    Smoking status: Former     Packs/day: 1.00     Years: 30.00     Total pack years: 30.00     Types: Cigarettes     Start date: 1990     Quit date: 2020     Years since quittin.9    Smokeless tobacco: Never   Vaping Use Vaping Use: Never used   Substance Use Topics    Alcohol use: Not Currently     Alcohol/week: 7.0 standard drinks of alcohol     Types: 7 Glasses of wine per week     Comment: Daily    Drug use: No       Review of Systems   Constitutional: Negative. Negative for chills, diaphoresis, fatigue and fever. HENT:  Positive for congestion, ear pain and sore throat. Negative for rhinorrhea. Eyes: Negative. Negative for visual disturbance. Respiratory:  Positive for chest tightness and shortness of breath. Negative for cough, hemoptysis, sputum production and wheezing. Cardiovascular:  Positive for chest pain. Negative for palpitations, leg swelling and syncope. Gastrointestinal:  Positive for diarrhea. Negative for abdominal pain, constipation, nausea and vomiting. Genitourinary: Negative. Negative for dysuria, flank pain, frequency and hematuria. Musculoskeletal:  Positive for myalgias. Negative for back pain and neck pain. Skin: Negative. Negative for rash. Neurological:  Positive for light-headedness. Negative for dizziness, syncope, weakness, numbness and headaches. Psychiatric/Behavioral: Negative. All other systems reviewed and are negative. Physical Exam  Physical Exam  Vitals and nursing note reviewed. Constitutional:       General: She is awake. She is not in acute distress. Appearance: She is well-developed and normal weight. She is not toxic-appearing or diaphoretic. HENT:      Head: Normocephalic and atraumatic. Right Ear: Hearing, tympanic membrane, ear canal and external ear normal.      Left Ear: Hearing, tympanic membrane, ear canal and external ear normal.      Nose: Congestion present. Mouth/Throat:      Mouth: Mucous membranes are moist. No oral lesions. Tongue: Tongue does not deviate from midline. Pharynx: Oropharynx is clear. Uvula midline. Posterior oropharyngeal erythema present. No oropharyngeal exudate.    Eyes:      General: Lids are normal. No scleral icterus. Extraocular Movements: Extraocular movements intact. Conjunctiva/sclera: Conjunctivae normal.      Pupils: Pupils are equal, round, and reactive to light. Neck:      Trachea: Trachea and phonation normal. No tracheal deviation. Cardiovascular:      Rate and Rhythm: Normal rate and regular rhythm. Pulses:           Radial pulses are 2+ on the right side and 2+ on the left side. Dorsalis pedis pulses are 2+ on the right side and 2+ on the left side. Posterior tibial pulses are 2+ on the right side and 2+ on the left side. Heart sounds: Normal heart sounds, S1 normal and S2 normal. No murmur heard. Pulmonary:      Effort: Pulmonary effort is normal. No tachypnea, accessory muscle usage or respiratory distress. Breath sounds: Normal breath sounds. No stridor. No wheezing, rhonchi or rales. Comments: No conversational dyspnea. Abdominal:      General: Bowel sounds are normal. There is no distension. Palpations: Abdomen is soft. Tenderness: There is no abdominal tenderness. There is no guarding or rebound. Musculoskeletal:         General: No tenderness. Cervical back: Normal range of motion and neck supple. Right lower leg: No tenderness. No edema. Left lower leg: No tenderness. No edema. Skin:     General: Skin is warm and dry. Capillary Refill: Capillary refill takes less than 2 seconds. Findings: No rash. Neurological:      General: No focal deficit present. Mental Status: She is alert and oriented to person, place, and time. GCS: GCS eye subscore is 4. GCS verbal subscore is 5. GCS motor subscore is 6. Cranial Nerves: No cranial nerve deficit. Sensory: Sensation is intact. No sensory deficit. Motor: Motor function is intact. No abnormal muscle tone. Gait: Gait normal.   Psychiatric:         Mood and Affect: Mood is anxious.          Speech: Speech normal.         Behavior: Behavior normal. Behavior is cooperative. Vital Signs  ED Triage Vitals [12/09/23 1532]   Temperature Pulse Respirations Blood Pressure SpO2   98.7 °F (37.1 °C) 87 16 (!) 212/117 98 %      Temp Source Heart Rate Source Patient Position - Orthostatic VS BP Location FiO2 (%)   Oral Monitor Sitting Left arm --      Pain Score       --           Vitals:    12/09/23 1532 12/09/23 1615 12/09/23 1652 12/09/23 1730   BP: (!) 212/117  169/80 151/81   Pulse: 87 75 74 70   Patient Position - Orthostatic VS: Sitting            Visual Acuity      ED Medications  Medications   ipratropium-albuterol (DUO-NEB) 0.5-2.5 mg/3 mL inhalation solution 3 mL (3 mL Nebulization Given 12/9/23 1554)   iohexol (OMNIPAQUE) 350 MG/ML injection (MULTI-DOSE) 85 mL (85 mL Intravenous Given 12/9/23 1634)   doxycycline hyclate (VIBRAMYCIN) capsule 100 mg (100 mg Oral Given 12/9/23 1839)   predniSONE tablet 40 mg (40 mg Oral Given 12/9/23 1839)       Diagnostic Studies  Results Reviewed       Procedure Component Value Units Date/Time    HS Troponin I 2hr [[id number]]  (Normal) Collected: 12/09/23 1742    Lab Status: Final result Specimen: Blood from Arm, Right Updated: 12/09/23 1809     hs TnI 2hr 4 ng/L      Delta 2hr hsTnI 0 ng/L     B-Type Natriuretic Peptide(BNP) [[id number]]  (Normal) Collected: 12/09/23 1539    Lab Status: Final result Specimen: Blood from Arm, Right Updated: 12/09/23 1633     BNP 56 pg/mL     FLU/RSV/COVID - if FLU/RSV clinically relevant [[id number]]  (Normal) Collected: 12/09/23 1539    Lab Status: Final result Specimen: Nares from Nose Updated: 12/09/23 1622     SARS-CoV-2 Negative     INFLUENZA A PCR Negative     INFLUENZA B PCR Negative     RSV PCR Negative    Narrative:      FOR PEDIATRIC PATIENTS - copy/paste COVID Guidelines URL to browser: [email or web address]    SARS-CoV-2 assay is a Nucleic Acid Amplification assay intended for the  qualitative detection of nucleic acid from SARS-CoV-2 in nasopharyngeal  swabs. Results are for the presumptive identification of SARS-CoV-2 RNA. Positive results are indicative of infection with SARS-CoV-2, the virus  causing COVID-19, but do not rule out bacterial infection or co-infection  with other viruses. Laboratories within the [facility] and its  territories are required to report all positive results to the appropriate  public health authorities. Negative results do not preclude SARS-CoV-2  infection and should not be used as the sole basis for treatment or other  patient management decisions. Negative results must be combined with  clinical observations, patient history, and epidemiological information. This test has not been FDA cleared or approved. This test has been authorized by FDA under an Emergency Use Authorization  (EUA). This test is only authorized for the duration of time the  declaration that circumstances exist justifying the authorization of the  emergency use of an in vitro diagnostic tests for detection of SARS-CoV-2  virus and/or diagnosis of COVID-19 infection under section 564(b)(1) of  the Act, 21 U. S.C. 132VZE-7(M)(5), unless the authorization is terminated  or revoked sooner. The test has been validated but independent review by FDA  and CLIA is pending. Test performed using [employer] GeneXpert: This RT-PCR assay targets N2,  a region unique to SARS-CoV-2. A conserved region in the E-gene was chosen  for pan-Sarbecovirus detection which includes SARS-CoV-2. According to CMS-2020-01-R, this platform meets the definition of high-throughput technology.     HS Troponin 0hr (reflex protocol) [[id number]]  (Normal) Collected: 12/09/23 1539    Lab Status: Final result Specimen: Blood from Arm, Right Updated: 12/09/23 1606     hs TnI 0hr 4 ng/L     HS Troponin I 4hr [[id number]]     Lab Status: No result Specimen: Blood     Comprehensive metabolic panel [[id number]] Collected: 12/09/23 1539    Lab Status: Final result Specimen: Blood from Arm, Right Updated: 12/09/23 1601     Sodium 140 mmol/L      Potassium 4.0 mmol/L      Chloride 104 mmol/L      CO2 28 mmol/L      ANION GAP 8 mmol/L      BUN 14 mg/dL      Creatinine 0.95 mg/dL      Glucose 111 mg/dL      Calcium 9.8 mg/dL      AST 15 U/L      ALT 14 U/L      Alkaline Phosphatase 66 U/L      Total Protein 7.1 g/dL      Albumin 4.7 g/dL      Total Bilirubin 0.41 mg/dL      eGFR 65 ml/min/1.73sq m     Narrative:      [place] guidelines for Chronic Kidney Disease (CKD):     Stage 1 with normal or high GFR (GFR > 90 mL/min/1.73 square meters)    Stage 2 Mild CKD (GFR = 60-89 mL/min/1.73 square meters)    Stage 3A Moderate CKD (GFR = 45-59 mL/min/1.73 square meters)    Stage 3B Moderate CKD (GFR = 30-44 mL/min/1.73 square meters)    Stage 4 Severe CKD (GFR = 15-29 mL/min/1.73 square meters)    Stage 5 End Stage CKD (GFR <15 mL/min/1.73 square meters)  Note: GFR calculation is accurate only with a steady state creatinine    Magnesium [[id number]]  (Normal) Collected: 12/09/23 1539    Lab Status: Final result Specimen: Blood from Arm, Right Updated: 12/09/23 1601     Magnesium 2.1 mg/dL     D-Dimer [[id number]]  (Abnormal) Collected: 12/09/23 1539    Lab Status: Final result Specimen: Blood from Arm, Right Updated: 12/09/23 1559     D-Dimer, Quant 1.17 ug/ml FEU     Narrative: In the evaluation for possible pulmonary embolism, in the appropriate (Well's Score of 4 or less) patient, the age adjusted d-dimer cutoff for this patient can be calculated as:    Age x 0.01 (in ug/mL) for Age-adjusted D-dimer exclusion threshold for a patient over 50 years.     Augustina Edwards [[id number]]  (Normal) Collected: 12/09/23 1539    Lab Status: Final result Specimen: Blood from Arm, Right Updated: 12/09/23 1555     Protime 13.0 seconds      INR 0.99    APTT [[id number]]  (Normal) Collected: 12/09/23 1539    Lab Status: Final result Specimen: Blood from Arm, Right Updated: 12/09/23 1555     PTT 27 seconds     CBC and differential [[id number]]  (Abnormal) Collected: 12/09/23 1539    Lab Status: Final result Specimen: Blood from Arm, Right Updated: 12/09/23 1545     WBC 10.56 Thousand/uL      RBC 4.96 Million/uL      Hemoglobin 14.7 g/dL      Hematocrit 44.8 %      MCV 90 fL      MCH 29.6 pg      MCHC 32.8 g/dL      RDW 12.8 %      MPV 9.5 fL      Platelets 095 Thousands/uL      nRBC 0 /100 WBCs      Neutrophils Relative 64 %      Immat GRANS % 0 %      Lymphocytes Relative 27 %      Monocytes Relative 6 %      Eosinophils Relative 2 %      Basophils Relative 1 %      Neutrophils Absolute 6.70 Thousands/µL      Immature Grans Absolute 0.02 Thousand/uL      Lymphocytes Absolute 2.86 Thousands/µL      Monocytes Absolute 0.68 Thousand/µL      Eosinophils Absolute 0.25 Thousand/µL      Basophils Absolute 0.05 Thousands/µL                    CTA ED chest PE Study   Final Result by Concha Moss MD (12/09 1826)         1. No evidence of acute pulmonary embolus, thoracic aortic aneurysm or dissection. 2. Stable pulmonary nodules measuring up to 11 mm since 2020.                   Workstation performed: [id number]                    Procedures  ECG 12 Lead Documentation Only    Date/Time: 12/9/2023 3:37 PM    Performed by: Henrique Noland PA-C  Authorized by: Henrique Noland PA-C    Indications / Diagnosis:  Dyspnea, HTN  ECG reviewed by me, the ED Provider: yes    Patient location:  ED  Previous ECG:     Previous ECG:  Compared to current    Comparison ECG info:  2/11/19    Similarity:  No change    Comparison to cardiac monitor: Yes    Interpretation:     Interpretation: normal    Rate:     ECG rate:  80    ECG rate assessment: normal    Rhythm:     Rhythm: sinus rhythm    Ectopy:     Ectopy: none    QRS:     QRS axis:  Normal    QRS intervals:  Normal  Conduction:     Conduction: normal    ST segments:     ST segments:  Normal  T waves:     T waves: normal    Comments: , QRS 88, QT/QTc 360/415; no acute ischemic changes. ED Course  ED Course as of 12/09/23 2030   Sat Dec 09, 2023   1532 Reviewed prior records. Was seen outpatient at urgent care on 11/25/23. Diagnosed with seasonal allergies and was placed on claritin and flonase. Had outpatient CT chest wo contrast performed yesterday for pulmonary nodules - report not yet available. 1547 WBC(!): 10.56  Similar elevated, similar to a year ago   1547 Hemoglobin: 14.7   1547 Platelet Count: 908   1559 POCT INR: 0.99   1559 PROTIME: 13.0   1559 PTT: 27   1559 D-Dimer, Quant(!): 1.17  Elevated, does not correct for age, and in light of symptoms and h/o DVT, will pursue CTA chest to evaluate for PE   1602 Glucose, Random: 111   1602 Creatinine: 0.95   1602 BUN: 14   1602 Sodium: 140   1602 Potassium: 4.0   1602 Chloride: 104   1602 CO2: 28   1602 Anion Gap: 8   1602 Calcium: 9.8   1602 AST: 15   1602 ALT: 14   1602 Alkaline Phosphatase: 66   1602 Total Protein: 7.1   1602 Albumin: 4.7   1602 TOTAL BILIRUBIN: 0.41   1602 eGFR: 65   1602 Magnesium: 2.1   1609 hs TnI 0hr: 4  Not c/w ACS   1634 BNP: 56  Not c/w CHF   1634 SARS-COV-2: Negative   1634 INFLU A PCR: Negative   1634 INFLU B PCR: Negative   1634 RSV PCR: Negative   1716 CTA performed and pending interpretation. Pt has been resting comfortably, no distress. Vital stable. [address] hsTnI: 0  Not c/w ACS   9380 CTA ED chest PE Study  IMPRESSION:        1. No evidence of acute pulmonary embolus, thoracic aortic aneurysm or dissection. 2. Stable pulmonary nodules measuring up to 11 mm since 2020.   1830 Pt reassessed. Updated on results. Symptoms felt to be related to a recent respiratory infection. Given underlying pulmonary emphysema, will cover with Abx, steroid. Pt afebrile, hemodynamically stable. BP still elevated but improved on recheck.   Pt notes upcoming appts with her PCP as well as pulmonologist.  She is already of the pulmonary nodules and these appear stable on CT since 2020. Prior pulmonary OV from 3/21/22 reviewed. Noted to have moderate emphysema on lung imaging but no significant obstruction on PFTs. Anoro continued as pt reported benefit from this. Pulmonary nodules being followed with CT imaging. Minimal pulmonary fibrosis noted on imaging. HEART Risk Score      Flowsheet Row Most Recent Value   Heart Score Risk Calculator    History 0 Filed at: 12/09/2023 1602   ECG 0 Filed at: 12/09/2023 1602   Age 1 Filed at: 12/09/2023 1602   Risk Factors 1 Filed at: 12/09/2023 1602   Troponin 0 Filed at: 12/09/2023 1602   HEART Score 2 Filed at: 12/09/2023 9862                              Wells' Criteria for PE      Flowsheet Row Most Recent Value   Wells' Criteria for PE    Clinical signs and symptoms of DVT 0 Filed at: 12/09/2023 2627   PE is primary diagnosis or equally likely 0 Filed at: 12/09/2023 1602   HR >100 0 Filed at: 12/09/2023 1602   Immobilization at least 3 days or Surgery in the previous 4 weeks 0 Filed at: 12/09/2023 8792   Previous, objectively diagnosed PE or DVT 1.5 Filed at: 12/09/2023 1602   Hemoptysis 0 Filed at: 12/09/2023 7010   Malignancy with treatment within 6 months or palliative 0 Filed at: 12/09/2023 1116   Wells' Criteria Total 1.5 Filed at: 12/09/2023 [address] Making  61 yo female presenting for evaluation of congestion and shortness of breath over the past few weeks. Pt is noted to be hypertensive, vitals otherwise stable. Will obtain EKG, labs, viral swab. Had non contrast CT scan performed yesterday due to follow up on pulmonary nodules. No acute findings noted on this scan after formal radiology read was requested. Will screen with d dimer given h/o DVT. If positive, will pursue CTA chest to evaluate for PE. Work up obtained as noted above. EKG and serial troponins not c/w ACS. No signs of volume overload and normal BNP, does not appear c/w CHF.  Mild nonspecific leukocytosis, no anemia. No hypo or hyperglycemia. Renal function within normal limits. Electrolytes within normal limits. D dimer elevated, Wells' low risk, CTA chest was pursued. CTA chest was negative for PE. No other acute pulmonary pathology. BP was significantly elevated upon arrival, improved on recheck without intervention. Given recent URI symptoms, this appears to be related to respiratory illness. Pt afebrile, hemodynamically stable. She has no hypoxia, no increased work of breathing. Will discharge with symptomatic management and outpatient follow up. Will Rx doxycycline, prednisone, albuterol inhaler. Reviewed symptomatic management. OTC meds reviewed. Anticipatory guidance. Advised recheck with PCP or return to ER as needed. Pt notes upcoming appt with PCP as well as pulmonary as scheduled. Strict return precautions outlined. Patient voiced understanding and had no further questions. Please refer to above ER course for further details/discussion. Problems Addressed:  Bronchitis: acute illness or injury  Dyspnea: acute illness or injury  Hypertension: acute illness or injury     Details: Advised to continue to monitor and recheck with PCP. Amount and/or Complexity of Data Reviewed  External Data Reviewed: labs, radiology, ECG and notes. Labs: ordered. Decision-making details documented in ED Course. Radiology: ordered and independent interpretation performed. Decision-making details documented in ED Course. ECG/medicine tests: ordered and independent interpretation performed. Decision-making details documented in ED Course. Risk  OTC drugs. Prescription drug management.              Disposition  Final diagnoses:   Bronchitis   Dyspnea   Hypertension     Time reflects when diagnosis was documented in both MDM as applicable and the Disposition within this note       Time User Action Codes Description Comment    12/9/2023  6:36 PM Tosin Francis Bronchitis     12/9/2023  6:38 PM Malinda Fine Add [R06.00] Dyspnea     12/9/2023  6:38 PM Malinda Fine Add [I10] Hypertension           ED Disposition       ED Disposition   Discharge    Condition   Stable    Date/Time   Sat Dec 9, 2023 [address] discharge to home/self care. Follow-up Information       Follow up With Specialties Details Why Contact Info Additional Information    Jeannie Haines,  Family Medicine Schedule an appointment as soon as possible for a visit   [address] [facility]. [address]  [address] [facility] [address] [facility] Emergency Medicine  As needed [address] [place] [address] [address] [facility] Emergency Department, [address] [place] [address], [facility]            Discharge Medication List as of 12/9/2023  6:48 PM        START taking these medications    Details   albuterol (PROVENTIL HFA,VENTOLIN HFA) 90 mcg/act inhaler Inhale 1-2 puffs every 4 (four) hours as needed for shortness of breath or wheezing, Starting Sat 12/9/2023, Normal      doxycycline hyclate (VIBRAMYCIN) 100 mg capsule Take 1 capsule (100 mg total) by mouth 2 (two) times a day for 7 days, Starting Sat 12/9/2023, Until Sat 12/16/2023, Normal      predniSONE 20 mg tablet Take 2 tablets (40 mg total) by mouth daily for 5 days Do not start before December 10, 2023., Starting Sun 12/10/2023, Until Fri 12/15/2023, Normal           CONTINUE these medications which have NOT CHANGED    Details   ! !  Anoro Ellipta 62.5-25 MCG/ACT inhaler Inhale 1 puff by mouth once daily, Starting Mon 11/13/2023, Normal      buPROPion (Wellbutrin SR) 150 mg 12 hr tablet Take 1 tablet (150 mg total) by mouth 2 (two) times a day, Starting Mon 3/21/2022, Normal      diclofenac sodium (VOLTAREN) 1 % Apply 2 g topically 4 (four) times a day, Historical Med      ergocalciferol (VITAMIN D2) 50,000 units Take 1 capsule (50,000 Units total) by mouth once a week for 12 doses, Starting Fri 4/16/2021, Until Fri 3/25/2022, Normal      fluocinolone acetonide (DermOtic) 0.01 % otic oil Administer 4 drops into both ears daily at bedtime, Starting Tue 4/12/2022, Normal      ibuprofen (MOTRIN) 600 mg tablet Take 1 tablet by mouth every 6 (six) hours as needed for mild pain or moderate pain for up to 30 days, Starting Fri 5/19/2017, Until Fri 3/25/2022 at 2359, Print      Multiple Vitamin (MULTI-VITAMIN DAILY PO) Take 1 tablet by mouth daily, Starting Wed 9/21/2016, Historical Med      omeprazole (PriLOSEC) 20 mg delayed release capsule Take 1 capsule (20 mg total) by mouth daily, Starting Thu 5/6/2021, Normal      !! umeclidinium-vilanterol (Anoro Ellipta) 62.5-25 MCG/INH inhaler Inhale 1 puff  in the morning., Starting Fri 5/20/2022, Normal       !! - Potential duplicate medications found. Please discuss with provider. No discharge procedures on file.     PDMP Review       None            ED Provider  Electronically Signed by             Bren Christianson PA-C  12/09/23 [id number]

## 2023-12-09 NOTE — DISCHARGE INSTRUCTIONS
Rest, fluids, etc.    Take antibiotic as directed for the full duration. Take steroid with food. Continue to alternate OTC tylenol and ibuprofen as needed for fever/discomfort. Continue your inhaler and medications as prescribed. Albuterol inhaler every 4-6 hours as needed for wheezing, shortness of breath. Follow up with PCP for recheck or return to ER as needed. Monitor your blood pressure at home and follow up with PCP.

## 2023-12-12 LAB
ATRIAL RATE: 80 BPM
P AXIS: 47 DEGREES
PR INTERVAL: 116 MS
QRS AXIS: 85 DEGREES
QRSD INTERVAL: 88 MS
QT INTERVAL: 360 MS
QTC INTERVAL: 415 MS
T WAVE AXIS: 74 DEGREES
VENTRICULAR RATE: 80 BPM

## 2023-12-14 DIAGNOSIS — J84.10 PULMONARY FIBROSIS (HCC): ICD-10-CM

## 2023-12-14 RX ORDER — UMECLIDINIUM BROMIDE AND VILANTEROL TRIFENATATE 62.5; 25 UG/1; UG/1
1 POWDER RESPIRATORY (INHALATION) DAILY
Qty: 60 EACH | Refills: 1 | Status: SHIPPED | OUTPATIENT
Start: 2023-12-14

## 2023-12-15 ENCOUNTER — TELEPHONE (OUTPATIENT)
Dept: FAMILY MEDICINE CLINIC | Facility: CLINIC | Age: 58
End: 2023-12-15

## 2023-12-15 ENCOUNTER — OFFICE VISIT (OUTPATIENT)
Dept: FAMILY MEDICINE CLINIC | Facility: CLINIC | Age: 58
End: 2023-12-15
Payer: COMMERCIAL

## 2023-12-15 ENCOUNTER — HOSPITAL ENCOUNTER (OUTPATIENT)
Dept: NON INVASIVE DIAGNOSTICS | Facility: HOSPITAL | Age: 58
Discharge: HOME | End: 2023-12-15
Payer: COMMERCIAL

## 2023-12-15 VITALS
BODY MASS INDEX: 23.1 KG/M2 | HEIGHT: 68 IN | OXYGEN SATURATION: 97 % | WEIGHT: 152.4 LBS | SYSTOLIC BLOOD PRESSURE: 168 MMHG | DIASTOLIC BLOOD PRESSURE: 90 MMHG | TEMPERATURE: 98.4 F | HEART RATE: 89 BPM

## 2023-12-15 DIAGNOSIS — Z12.4 SCREENING FOR CERVICAL CANCER: ICD-10-CM

## 2023-12-15 DIAGNOSIS — Z12.31 ENCOUNTER FOR SCREENING MAMMOGRAM FOR MALIGNANT NEOPLASM OF BREAST: ICD-10-CM

## 2023-12-15 DIAGNOSIS — R03.0 ELEVATED BLOOD PRESSURE READING: ICD-10-CM

## 2023-12-15 DIAGNOSIS — K21.9 GASTROESOPHAGEAL REFLUX DISEASE WITHOUT ESOPHAGITIS: ICD-10-CM

## 2023-12-15 DIAGNOSIS — E55.9 VITAMIN D DEFICIENCY: ICD-10-CM

## 2023-12-15 DIAGNOSIS — Z13.6 SCREENING FOR CARDIOVASCULAR CONDITION: ICD-10-CM

## 2023-12-15 DIAGNOSIS — Z12.11 SCREENING FOR COLON CANCER: ICD-10-CM

## 2023-12-15 DIAGNOSIS — R79.89 POSITIVE D DIMER: ICD-10-CM

## 2023-12-15 DIAGNOSIS — M79.662 PAIN OF LEFT CALF: ICD-10-CM

## 2023-12-15 DIAGNOSIS — M79.662 PAIN OF LEFT CALF: Primary | ICD-10-CM

## 2023-12-15 PROCEDURE — 93971 EXTREMITY STUDY: CPT

## 2023-12-15 PROCEDURE — 93971 EXTREMITY STUDY: CPT | Performed by: SURGERY

## 2023-12-15 PROCEDURE — 99204 OFFICE O/P NEW MOD 45 MIN: CPT | Performed by: FAMILY MEDICINE

## 2023-12-15 RX ORDER — MELATONIN
2000 DAILY
COMMUNITY

## 2023-12-15 NOTE — TELEPHONE ENCOUNTER
Please call patient. We did not get to give her a flu shot or Prevnar 20 today because she needed to get to the hospital for her lower extremity Doppler. Tell her she can make a nurse visit to come in for those shots anytime she wishes to.

## 2023-12-15 NOTE — PROGRESS NOTES
Name: Jassi Middleton      : 1965      MRN: [id number]  Encounter Provider: Lawanda Evans DO  Encounter Date: 12/15/2023   Encounter department: [address]   I will get a stat lower extremity venous Doppler on her today. Lab work ordered. Encouraged her to cut back and hopefully quit smoking. She will follow-up with pulmonology as scheduled in January. I will see her back in 1 month or as needed  1. Pain of left calf  -     VAS lower limb venous duplex study, unilateral/limited; Future; Expected date: 12/15/2023    2. Positive D dimer  -     VAS lower limb venous duplex study, unilateral/limited; Future; Expected date: 12/15/2023    3. Screening for cervical cancer  -     Ambulatory Referral to Gynecology; Future    4. Screening for colon cancer  -     Ambulatory Referral to Gastroenterology; Future    5. Encounter for screening mammogram for malignant neoplasm of breast  -     Mammo screening bilateral w 3d & cad; Future; Expected date: 12/15/2023    6. Gastroesophageal reflux disease without esophagitis  -     Ambulatory Referral to Gastroenterology; Future    7. Vitamin D deficiency  -     Vitamin D 25 hydroxy    8. Elevated blood pressure reading  -     Basic metabolic panel  -     TSH, 3rd generation with Free T4 reflex    9. Screening for cardiovascular condition  -     Lipid Panel with Direct LDL reflex  -     Basic metabolic panel        Depression Screening and Follow-up Plan: Patient was screened for depression during today's encounter. They screened negative with a PHQ-2 score of 0. Subjective     New patient here to establish herself. Patient's been getting over bronchitis. Was in the ER. D-dimer was positive. CAT scan of chest did not reveal pulmonary embolism. Patient does have left lower leg pain. Has had trouble with her legs in the past.  Had saphenous vein clot before. Still feels short of breath with activity.   She is still smoking, but not as much as she used to. Review of Systems   Constitutional: Negative. Respiratory:  Positive for shortness of breath (with activity). Cardiovascular: Negative. Gastrointestinal: Negative. Genitourinary: Negative. Past Medical History:   Diagnosis Date   • Arthritis     RIGHT KNEE    • COPD (chronic obstructive pulmonary disease) ([address])    • DVT (deep venous thrombosis) ([address])     Sept 2020   • Emphysema of lung ([address])    • GERD (gastroesophageal reflux disease)    • Gestational hypertension    • Nerve pain    • Pneumonia     2018   • Pulmonary emphysema (HCC)    • Thrombophlebitis     RIGHT LEG      Past Surgical History:   Procedure Laterality Date   • APPENDECTOMY  1999   • CHOLECYSTECTOMY     • ENDOVASCULAR LASER THERAPY (EVLT)      Left   • [place] ENDOVEN ABLTJ INCMPTNT VEIN XTR LASER 1ST VEIN Left 3/12/2021    Procedure: ENDOVASCULAR LASER THERAPY (EVLT) + Stab Phlebectomy (10-20); Surgeon: Master Billy MD;  [facility];  Service: Vascular   • [place] ENDOVEN ABLTJ INCMPTNT VEIN XTR LASER 1ST VEIN Right 6/18/2021    Procedure: ENDOVASCULAR LASER THERAPY (EVLT) R GSV EVLT + Stab Phlebectomy (10-20);   Surgeon: Master Billy MD;  [facility];  Service: Vascular   • [place] [address] WRST SURG W/RLS TRANSVRS CARPL LIGM Left 5/19/2017    Procedure: ENDOSCOPIC CARPAL TUNNEL RELEASE ;  Surgeon: Ellyn Foster MD;  [facility];  Service: Orthopedics   • TONSILLECTOMY       Family History   Problem Relation Age of Onset   • Cancer Father 80        thinks colon cancer   • Heart attack Father 76   • Heart disease Father    • Heart attack Mother    • Thyroid disease Mother    • Atrial fibrillation Mother         pacemaker   • Heart disease Mother    • Lung cancer Sister         (they share a mother only)    • No Known Problems Brother    • No Known Problems Daughter    • Thyroid disease Maternal Grandmother    • Coronary artery disease Paternal Grandfather    • No Known Problems Daughter    • No Known Problems Paternal Aunt      Social History     Socioeconomic History   • Marital status: Legally      Spouse name: None   • Number of children: 2   • Years of education: None   • Highest education level: None   Occupational History   • None   Tobacco Use   • Smoking status: Former     Current packs/day: 0.00     Average packs/day: 1 pack/day for 30.1 years (30.1 ttl pk-yrs)     Types: Cigarettes     Start date: 1990     Quit date: 2020     Years since quittin.9   • Smokeless tobacco: Never   Vaping Use   • Vaping status: Never Used   Substance and Sexual Activity   • Alcohol use: Not Currently     Alcohol/week: 7.0 standard drinks of alcohol     Types: 7 Glasses of wine per week     Comment: Daily   • Drug use: No   • Sexual activity: Not Currently   Other Topics Concern   • None   Social History Narrative   • None     Social Determinants of Health     Financial Resource Strain: Not on file   Food Insecurity: Not on file   Transportation Needs: Not on file   Physical Activity: Not on file   Stress: Not on file   Social Connections: Not on file   Intimate Partner Violence: Not on file   Housing Stability: Not on file     Current Outpatient Medications on File Prior to Visit   Medication Sig   • albuterol (PROVENTIL HFA,VENTOLIN HFA) 90 mcg/act inhaler Inhale 1-2 puffs every 4 (four) hours as needed for shortness of breath or wheezing   • buPROPion (Wellbutrin SR) 150 mg 12 hr tablet Take 1 tablet (150 mg total) by mouth 2 (two) times a day   • cholecalciferol (VITAMIN D3) 1,000 units tablet Take 2,000 Units by mouth daily Stated she is taking it weekly   • diclofenac sodium (VOLTAREN) 1 % Apply 2 g topically 4 (four) times a day   • doxycycline hyclate (VIBRAMYCIN) 100 mg capsule Take 1 capsule (100 mg total) by mouth 2 (two) times a day for 7 days   • ibuprofen (MOTRIN) 600 mg tablet Take 1 tablet by mouth every 6 (six) hours as needed for mild pain or moderate pain for up to 30 days   • Multiple Vitamin (MULTI-VITAMIN DAILY PO) Take 1 tablet by mouth daily   • omeprazole (PriLOSEC) 20 mg delayed release capsule Take 1 capsule (20 mg total) by mouth daily   • umeclidinium-vilanterol (Anoro Ellipta) 62.5-25 mcg/actuation inhaler Inhale 1 puff daily   • [DISCONTINUED] predniSONE 20 mg tablet Take 2 tablets (40 mg total) by mouth daily for 5 days Do not start before December 10, 2023. • [DISCONTINUED] ergocalciferol (VITAMIN D2) 50,000 units Take 1 capsule (50,000 Units total) by mouth once a week for 12 doses (Patient not taking: Reported on 12/15/2023)   • [DISCONTINUED] fluocinolone acetonide (DermOtic) 0.01 % otic oil Administer 4 drops into both ears daily at bedtime (Patient not taking: Reported on 12/15/2023)   • [DISCONTINUED] umeclidinium-vilanterol (Anoro Ellipta) 62.5-25 MCG/INH inhaler Inhale 1 puff  in the morning. (Patient not taking: Reported on 12/15/2023)     Allergies   Allergen Reactions   • Seasonal Ic  [Cholestatin]      Immunization History   Administered Date(s) Administered   • COVID-19 PFIZER VACCINE 0.3 ML IM 05/18/2021, 06/12/2021   • Influenza, recombinant, quadrivalent,injectable, preservative free 11/13/2020, 03/25/2022   • Pneumococcal Polysaccharide PPV23 12/10/2020   • Tdap 09/21/2016       Objective     /90   Pulse 89   Temp 98.4 °F (36.9 °C)   Ht 5' 8" (1.727 m)   Wt 69.1 kg (152 lb 6.4 oz)   SpO2 97%   BMI 23.17 kg/m²     Physical Exam  Vitals reviewed. Constitutional:       General: She is not in acute distress. Appearance: Normal appearance. She is well-developed. She is not ill-appearing, toxic-appearing or diaphoretic. HENT:      Head: Normocephalic and atraumatic. Eyes:      Conjunctiva/sclera: Conjunctivae normal.   Cardiovascular:      Rate and Rhythm: Normal rate and regular rhythm. Heart sounds: Normal heart sounds. No murmur heard. No friction rub. No gallop.    Pulmonary:      Effort: Pulmonary effort is normal. No respiratory distress. Breath sounds: Normal breath sounds. No wheezing, rhonchi or rales. Musculoskeletal:         General: Tenderness (Mild L calf tenderness) present. Right lower leg: No edema. Left lower leg: No edema. Neurological:      General: No focal deficit present. Mental Status: She is alert and oriented to person, place, and time. Psychiatric:         Mood and Affect: Mood normal.         Behavior: Behavior normal.         Thought Content:  Thought content normal.         Judgment: Judgment normal.       Naren Branham DO

## 2024-01-12 ENCOUNTER — OFFICE VISIT (OUTPATIENT)
Dept: PULMONOLOGY | Facility: CLINIC | Age: 59
End: 2024-01-12
Payer: COMMERCIAL

## 2024-01-12 VITALS
OXYGEN SATURATION: 95 % | BODY MASS INDEX: 23.35 KG/M2 | WEIGHT: 153.6 LBS | SYSTOLIC BLOOD PRESSURE: 162 MMHG | TEMPERATURE: 97.9 F | HEART RATE: 77 BPM | DIASTOLIC BLOOD PRESSURE: 98 MMHG

## 2024-01-12 DIAGNOSIS — J84.10 PULMONARY FIBROSIS (HCC): ICD-10-CM

## 2024-01-12 DIAGNOSIS — J43.2 CENTRILOBULAR EMPHYSEMA (HCC): ICD-10-CM

## 2024-01-12 DIAGNOSIS — R91.8 PULMONARY NODULES: ICD-10-CM

## 2024-01-12 DIAGNOSIS — F17.210 CIGARETTE NICOTINE DEPENDENCE WITHOUT COMPLICATION: Primary | ICD-10-CM

## 2024-01-12 PROCEDURE — 99214 OFFICE O/P EST MOD 30 MIN: CPT | Performed by: INTERNAL MEDICINE

## 2024-01-12 RX ORDER — UMECLIDINIUM BROMIDE AND VILANTEROL TRIFENATATE 62.5; 25 UG/1; UG/1
1 POWDER RESPIRATORY (INHALATION) DAILY
Qty: 60 EACH | Refills: 11 | Status: SHIPPED | OUTPATIENT
Start: 2024-01-12

## 2024-01-12 RX ORDER — BUPROPION HYDROCHLORIDE 150 MG/1
150 TABLET, EXTENDED RELEASE ORAL 2 TIMES DAILY
Qty: 60 TABLET | Refills: 3 | Status: SHIPPED | OUTPATIENT
Start: 2024-01-12

## 2024-01-12 NOTE — PROGRESS NOTES
Pulmonary Follow Up Note   Suki Mares 58 y.o. female MRN: [id number]  1/12/2024      Assessment/Plan:     Centrilobular emphysema (HCC)  Patient has radiographic evidence of emphysema and has found Anoro Ellipta to be beneficial.  Prior PFTs from 2020 showed no significant obstruction, but with isolated diffusion impairment.  She does not perceive any change in her pulmonary status since that time, therefore no need to update PFTs.    Pulmonary nodules  Pulmonary nodules are stable for greater than 2 years, therefore no longer need to be followed radiographically.  However, she does qualify for annual low-dose CT for lung cancer screening purposes.    Cigarette nicotine dependence without complication  Patient would like assistance in quitting smoking.  She found Wellbutrin to be beneficial in the past.  I have prescribed it again with plans for her to follow-up with our smoking cessation team at [facility] in 1-2 months to follow-up on her progress.    She can follow-up with me in 1 year or sooner if needed    Visit orders:    Diagnoses and all orders for this visit:    Cigarette nicotine dependence without complication  -     buPROPion (Wellbutrin SR) 150 mg 12 hr tablet; Take 1 tablet (150 mg total) by mouth 2 (two) times a day    Pulmonary fibrosis (HCC)  -     umeclidinium-vilanterol (Anoro Ellipta) 62.5-25 mcg/actuation inhaler; Inhale 1 puff daily    Centrilobular emphysema (HCC)    Pulmonary nodules          History of Present Illness   HPI:  Suki Mares is a 58 y.o. female who here today for follow-up regarding emphysema and ongoing tobacco abuse.  Her last visit with me was in March 2022 at which time I had prescribed Wellbutrin to help with smoking cessation.  She did take the medication and was able to quit smoking within 3 days.  Unfortunately, she had some personal stressors and started smoking again.  She is currently smoking 5 to 6 years/day.  She has been controlled with Anoro Ellipta.   She has dry mouth related to it but it does improve her shortness of breath.  She developed an acute respiratory illness prompting ED evaluation on 12/9/2023.  Those symptoms have since resolved.    Review of Systems otherwise negative    Medical, Family and Social history reviewed and updated as appropriate    Historical Information   Past Medical History:   Diagnosis Date    Arthritis     RIGHT KNEE     COPD (chronic obstructive pulmonary disease) (HCC)     DVT (deep venous thrombosis) (HCC)     Sept 2020    Emphysema of lung (HCC)     GERD (gastroesophageal reflux disease)     Gestational hypertension     Nerve pain     Pneumonia     2018    Pulmonary emphysema (HCC)     Thrombophlebitis     RIGHT LEG      Past Surgical History:   Procedure Laterality Date    APPENDECTOMY  1999    CHOLECYSTECTOMY      ENDOVASCULAR LASER THERAPY (EVLT)      Left    MD ENDOVEN ABLTJ INCMPTNT VEIN XTR LASER 1ST VEIN Left 3/12/2021    Procedure: ENDOVASCULAR LASER THERAPY (EVLT) + Stab Phlebectomy (10-20);  Surgeon: Angel Johnston MD;  [facility];  Service: Vascular    MD ENDOVEN ABLTJ INCMPTNT VEIN XTR LASER 1ST VEIN Right 6/18/2021    Procedure: ENDOVASCULAR LASER THERAPY (EVLT) R GSV EVLT + Stab Phlebectomy (10-20);  Surgeon: Angel Johnston MD;  [facility];  Service: Vascular    MD NDSC WRST SURG W/RLS TRANSVRS CARPL LIGM Left 5/19/2017    Procedure: ENDOSCOPIC CARPAL TUNNEL RELEASE ;  Surgeon: Frederick Goff MD;  [facility];  Service: Orthopedics    TONSILLECTOMY       Family History   Problem Relation Age of Onset    Cancer Father 80        thinks colon cancer    Heart attack Father 68    Heart disease Father     Heart attack Mother     Thyroid disease Mother     Atrial fibrillation Mother         pacemaker    Heart disease Mother     Lung cancer Sister         (they share a mother only)     No Known Problems Brother     No Known Problems Daughter     Thyroid disease Maternal  Grandmother     Coronary artery disease Paternal Grandfather     No Known Problems Daughter     No Known Problems Paternal Aunt        Social History     Tobacco Use   Smoking Status Former    Current packs/day: 0.25    Average packs/day: 1 pack/day for 30.8 years (30.3 ttl pk-yrs)    Types: Cigarettes    Start date: 11/13/1990    Quit date: 12/22/2020   Smokeless Tobacco Never   Tobacco Comments    About 5 cigs a day 1/12/24         Meds/Allergies     Current Outpatient Medications:     buPROPion (Wellbutrin SR) 150 mg 12 hr tablet, Take 1 tablet (150 mg total) by mouth 2 (two) times a day, Disp: 60 tablet, Rfl: 3    cholecalciferol (VITAMIN D3) 1,000 units tablet, Take 2,000 Units by mouth daily Stated she is taking it weekly, Disp: , Rfl:     diclofenac sodium (VOLTAREN) 1 %, Apply 2 g topically 4 (four) times a day, Disp: , Rfl:     ibuprofen (MOTRIN) 600 mg tablet, Take 1 tablet by mouth every 6 (six) hours as needed for mild pain or moderate pain for up to 30 days, Disp: 10 tablet, Rfl: 0    Multiple Vitamin (MULTI-VITAMIN DAILY PO), Take 1 tablet by mouth daily, Disp: , Rfl:     omeprazole (PriLOSEC) 20 mg delayed release capsule, Take 1 capsule (20 mg total) by mouth daily, Disp: 30 capsule, Rfl: 2    umeclidinium-vilanterol (Anoro Ellipta) 62.5-25 mcg/actuation inhaler, Inhale 1 puff daily, Disp: 60 each, Rfl: 11    albuterol (PROVENTIL HFA,VENTOLIN HFA) 90 mcg/act inhaler, Inhale 1-2 puffs every 4 (four) hours as needed for shortness of breath or wheezing (Patient not taking: Reported on 1/12/2024), Disp: 8 g, Rfl: 0  Allergies   Allergen Reactions    Seasonal Ic  [Cholestatin]        Vitals: Blood pressure 162/98, pulse 77, temperature 97.9 °F (36.6 °C), weight 69.7 kg (153 lb 9.6 oz), SpO2 95%. Body mass index is 23.35 kg/m². Oxygen Therapy  SpO2: 95 %    Physical Exam   Physical Exam  Constitutional:       General: She is not in acute distress.     Appearance: Normal appearance.   HENT:      Head:  "Normocephalic.      Mouth/Throat:      Pharynx: No oropharyngeal exudate.   Eyes:      General: No scleral icterus.  Neck:      Vascular: No JVD.   Cardiovascular:      Rate and Rhythm: Normal rate and regular rhythm.   Pulmonary:      Breath sounds: No wheezing, rhonchi or rales.   Musculoskeletal:         General: No deformity.      Cervical back: Neck supple.   Lymphadenopathy:      Cervical: No cervical adenopathy.   Skin:     General: Skin is warm and dry.   Neurological:      Mental Status: She is alert and oriented to person, place, and time.   Psychiatric:         Mood and Affect: Mood normal.         Labs: I have personally reviewed pertinent lab results.  Lab Results   Component Value Date    WBC 10.56 (H) 12/09/2023    HGB 14.7 12/09/2023    HCT 44.8 12/09/2023    MCV 90 12/09/2023     12/09/2023     Lab Results   Component Value Date    GLUCOSE 104 09/28/2015    CALCIUM 9.8 12/09/2023     09/28/2015    K 4.0 12/09/2023    CO2 28 12/09/2023     12/09/2023    BUN 14 12/09/2023    CREATININE 0.95 12/09/2023     No results found for: \"IGE\"  Lab Results   Component Value Date    ALT 14 12/09/2023    AST 15 12/09/2023    ALKPHOS 66 12/09/2023    BILITOT 0.26 09/28/2015       Imaging and other studies: I have personally reviewed pertinent reports.   and I have personally reviewed pertinent films in PACS CT from 12/9/2023 shows no evidence of pulmonary embolism.  There are stable patchy airspace opacities at the lung apices with paraseptal emphysema.  There is a 1.1 cm nodule in the right lower lobe, stable going back to December 2020    Pulmonary function testing:  Performed 12/7/2020 and personally interpreted  FEV1/FVC ratio 74%   FEV1 71% predicted  FVC 75% predicted.  TLC 84 % predicted   % predicted  DLCO corrected for hemoglobin 35 % predicted.  Spirometry and lung volumes are normal.  Diffusion capacity is reduced  "

## 2024-01-12 NOTE — ASSESSMENT & PLAN NOTE
Pulmonary nodules are stable for greater than 2 years, therefore no longer need to be followed radiographically.  However, she does qualify for annual low-dose CT for lung cancer screening purposes.

## 2024-01-12 NOTE — ASSESSMENT & PLAN NOTE
Patient has radiographic evidence of emphysema and has found Anoro Ellipta to be beneficial.  Prior PFTs from 2020 showed no significant obstruction, but with isolated diffusion impairment.  She does not perceive any change in her pulmonary status since that time, therefore no need to update PFTs.

## 2024-01-12 NOTE — ASSESSMENT & PLAN NOTE
Patient would like assistance in quitting smoking.  She found Wellbutrin to be beneficial in the past.  I have prescribed it again with plans for her to follow-up with our smoking cessation team at [facility] in 1-2 months to follow-up on her progress.

## 2024-03-19 ENCOUNTER — APPOINTMENT (OUTPATIENT)
Dept: LAB | Facility: MEDICAL CENTER | Age: 59
End: 2024-03-19
Payer: COMMERCIAL

## 2024-03-19 LAB
25(OH)D3 SERPL-MCNC: 22.3 NG/ML (ref 30–100)
ANION GAP SERPL CALCULATED.3IONS-SCNC: 10 MMOL/L (ref 4–13)
BUN SERPL-MCNC: 17 MG/DL (ref 5–25)
CALCIUM SERPL-MCNC: 9.2 MG/DL (ref 8.4–10.2)
CHLORIDE SERPL-SCNC: 103 MMOL/L (ref 96–108)
CHOLEST SERPL-MCNC: 189 MG/DL
CO2 SERPL-SCNC: 26 MMOL/L (ref 21–32)
CREAT SERPL-MCNC: 0.59 MG/DL (ref 0.6–1.3)
GFR SERPL CREATININE-BSD FRML MDRD: 99 ML/MIN/1.73SQ M
GLUCOSE P FAST SERPL-MCNC: 101 MG/DL (ref 65–99)
HDLC SERPL-MCNC: 69 MG/DL
LDLC SERPL CALC-MCNC: 100 MG/DL (ref 0–100)
POTASSIUM SERPL-SCNC: 4 MMOL/L (ref 3.5–5.3)
SODIUM SERPL-SCNC: 139 MMOL/L (ref 135–147)
TRIGL SERPL-MCNC: 99 MG/DL
TSH SERPL DL<=0.05 MIU/L-ACNC: 2.12 UIU/ML (ref 0.45–4.5)

## 2024-03-22 ENCOUNTER — OFFICE VISIT (OUTPATIENT)
Dept: FAMILY MEDICINE CLINIC | Facility: CLINIC | Age: 59
End: 2024-03-22
Payer: COMMERCIAL

## 2024-03-22 VITALS
OXYGEN SATURATION: 96 % | SYSTOLIC BLOOD PRESSURE: 144 MMHG | HEART RATE: 100 BPM | BODY MASS INDEX: 23.16 KG/M2 | HEIGHT: 68 IN | DIASTOLIC BLOOD PRESSURE: 94 MMHG | WEIGHT: 152.8 LBS | TEMPERATURE: 98.3 F

## 2024-03-22 DIAGNOSIS — J40 BRONCHITIS: ICD-10-CM

## 2024-03-22 DIAGNOSIS — M25.552 LEFT HIP PAIN: ICD-10-CM

## 2024-03-22 DIAGNOSIS — M17.12 PATELLOFEMORAL ARTHRITIS OF LEFT KNEE: ICD-10-CM

## 2024-03-22 DIAGNOSIS — Z12.4 SCREENING FOR CERVICAL CANCER: Primary | ICD-10-CM

## 2024-03-22 DIAGNOSIS — M25.532 LEFT WRIST PAIN: ICD-10-CM

## 2024-03-22 DIAGNOSIS — J43.2 CENTRILOBULAR EMPHYSEMA (HCC): ICD-10-CM

## 2024-03-22 PROCEDURE — 99214 OFFICE O/P EST MOD 30 MIN: CPT | Performed by: FAMILY MEDICINE

## 2024-03-22 RX ORDER — ALBUTEROL SULFATE 90 UG/1
1-2 AEROSOL, METERED RESPIRATORY (INHALATION) EVERY 4 HOURS PRN
Qty: 8 G | Refills: 3 | Status: SHIPPED | OUTPATIENT
Start: 2024-03-22

## 2024-03-22 NOTE — PROGRESS NOTES
Name: Suki Mares      : 1965      MRN: [id number]  Encounter Provider: Brayden Zaman DO  Encounter Date: 3/22/2024   Encounter department: [facility]    Assessment & Plan   I will get an x-ray of her left hip and knee.  I will refer her to orthopedics for all these problems.  Encouraged her to cut back and hopefully quit smoking.  Told her to try and eat something with the ibuprofen when she takes it.  Continue the omeprazole to help with her stomach.  Follow-up with specialist as scheduled.  Follow-up here as scheduled or as needed  1. Screening for cervical cancer  -     Ambulatory Referral to Obstetrics / Gynecology; Future    2. Left wrist pain  -     Ambulatory Referral to Orthopedic Surgery; Future    3. Left hip pain  -     Ambulatory Referral to Orthopedic Surgery; Future  -     XR hip/pelv 2-3 vws left if performed; Future; Expected date: 2024    4. Patellofemoral arthritis of left knee  -     Ambulatory Referral to Orthopedic Surgery; Future  -     XR knee 3 vw left non injury; Future; Expected date: 2024    5. Bronchitis    6. Centrilobular emphysema (HCC)  -     albuterol (PROVENTIL HFA,VENTOLIN HFA) 90 mcg/act inhaler; Inhale 1-2 puffs every 4 (four) hours as needed for shortness of breath or wheezing           Subjective     Patient here today with a couple concerns.  She states a couple weeks ago started having left hand pain.  When she got home, she noticed she had swelling on the volar side of her left wrist.  It hurts to touch.  She is been trying to wrap it with an Ace bandage, and recently got a soft splint to wear.  She has numbness and tingling in her fingers, much like when she had carpal tunnel.  She does not remember any specific trauma.  Also, she states in January was vacuuming, and heard a pop in her left hip that hurt.  Since then she has pain and it sometimes going down her lateral thigh.  She had pain in the lateral thigh before, but it is much  more intense now.  Also having left knee pain.  It pops on her.  It has never given out on her.  She does take ibuprofen a couple times a day, which only minimally helps with any of her pains.      Review of Systems   Constitutional: Negative.    Respiratory: Negative.     Cardiovascular: Negative.    Gastrointestinal: Negative.    Genitourinary: Negative.    Musculoskeletal:  Positive for arthralgias.       Past Medical History:   Diagnosis Date   • Arthritis     RIGHT KNEE    • COPD (chronic obstructive pulmonary disease) ([facility])    • DVT (deep venous thrombosis) ([facility])     Sept 2020   • Emphysema of lung (HCC)    • GERD (gastroesophageal reflux disease)    • Gestational hypertension    • Nerve pain    • Pneumonia     2018   • Pulmonary emphysema (HCC)    • Thrombophlebitis     RIGHT LEG      Past Surgical History:   Procedure Laterality Date   • APPENDECTOMY  1999   • CHOLECYSTECTOMY     • ENDOVASCULAR LASER THERAPY (EVLT)      Left   • W[place] ENDOVEN ABLTJ INCMPTNT VEIN XTR LASER 1ST VEIN Left 3/12/2021    Procedure: ENDOVASCULAR LASER THERAPY (EVLT) + Stab Phlebectomy (10-20);  Surgeon: Angel Johnston MD;  [facility];  Service: Vascular   • W[place] ENDOVEN ABLTJ INCMPTNT VEIN XTR LASER 1ST VEIN Right 6/18/2021    Procedure: ENDOVASCULAR LASER THERAPY (EVLT) R GSV EVLT + Stab Phlebectomy (10-20);  Surgeon: Angel Johnston MD;  Location: [facility];  Service: Vascular   • W[place] NDSC WRST SURG W/RLS TRANSVRS CARPL LIGM Left 5/19/2017    Procedure: ENDOSCOPIC CARPAL TUNNEL RELEASE ;  Surgeon: Frederick Goff MD;  Location: [facility];  Service: Orthopedics   • TONSILLECTOMY       Family History   Problem Relation Age of Onset   • Cancer Father 80        thinks colon cancer   • Heart attack Father 68   • Heart disease Father    • Heart attack Mother    • Thyroid disease Mother    • Atrial fibrillation Mother         pacemaker   • Heart disease Mother    • Lung cancer Sister         (they share a mother only)     • No Known Problems Brother    • No Known Problems Daughter    • Thyroid disease Maternal Grandmother    • Coronary artery disease Paternal Grandfather    • No Known Problems Daughter    • No Known Problems Paternal Aunt      Social History     Socioeconomic History   • Marital status: Legally      Spouse name: None   • Number of children: 2   • Years of education: None   • Highest education level: None   Occupational History   • None   Tobacco Use   • Smoking status: Former     Current packs/day: 0.25     Average packs/day: 1 pack/day for 31.0 years (30.3 ttl pk-yrs)     Types: Cigarettes     Start date: 11/13/1990     Quit date: 12/22/2020   • Smokeless tobacco: Never   • Tobacco comments:     About 5 cigs a day 1/12/24   Vaping Use   • Vaping status: Never Used   Substance and Sexual Activity   • Alcohol use: Not Currently     Alcohol/week: 7.0 standard drinks of alcohol     Types: 7 Glasses of wine per week     Comment: Daily   • Drug use: No   • Sexual activity: Not Currently   Other Topics Concern   • None   Social History Narrative   • None     Social Determinants of Health     Financial Resource Strain: Not on file   Food Insecurity: Not on file   Transportation Needs: Not on file   Physical Activity: Not on file   Stress: Not on file   Social Connections: Not on file   Intimate Partner Violence: Not on file   Housing Stability: Not on file     Current Outpatient Medications on File Prior to Visit   Medication Sig   • buPROPion (Wellbutrin SR) 150 mg 12 hr tablet Take 1 tablet (150 mg total) by mouth 2 (two) times a day   • cholecalciferol (VITAMIN D3) 1,000 units tablet Take 2,000 Units by mouth daily Stated she is taking it weekly   • diclofenac sodium (VOLTAREN) 1 % Apply 2 g topically 4 (four) times a day   • ibuprofen (MOTRIN) 600 mg tablet Take 1 tablet by mouth every 6 (six) hours as needed for mild pain or moderate pain for up to 30 days   • Multiple Vitamin (MULTI-VITAMIN DAILY PO) Take 1  "tablet by mouth daily   • omeprazole (PriLOSEC) 20 mg delayed release capsule Take 1 capsule (20 mg total) by mouth daily   • umeclidinium-vilanterol (Anoro Ellipta) 62.5-25 mcg/actuation inhaler Inhale 1 puff daily   • [DISCONTINUED] albuterol (PROVENTIL HFA,VENTOLIN HFA) 90 mcg/act inhaler Inhale 1-2 puffs every 4 (four) hours as needed for shortness of breath or wheezing     Allergies   Allergen Reactions   • Seasonal Ic  [Cholestatin]      Immunization History   Administered Date(s) Administered   • COVID-19 PFIZER VACCINE 0.3 ML IM 05/18/2021, 06/12/2021   • Influenza, recombinant, quadrivalent,injectable, preservative free 11/13/2020, 03/25/2022   • Pneumococcal Polysaccharide PPV23 12/10/2020   • Tdap 09/21/2016       Objective     /94   Pulse 100   Temp 98.3 °F (36.8 °C)   Ht 5' 8\" (1.727 m)   Wt 69.3 kg (152 lb 12.8 oz)   SpO2 96%   BMI 23.23 kg/m²     Physical Exam  Vitals reviewed.   Constitutional:       General: She is not in acute distress.     Appearance: Normal appearance. She is well-developed. She is not ill-appearing, toxic-appearing or diaphoretic.   HENT:      Head: Normocephalic and atraumatic.   Eyes:      Conjunctiva/sclera: Conjunctivae normal.   Cardiovascular:      Rate and Rhythm: Normal rate and regular rhythm.      Heart sounds: Normal heart sounds. No murmur heard.     No friction rub. No gallop.   Pulmonary:      Effort: Pulmonary effort is normal. No respiratory distress.      Breath sounds: Normal breath sounds. No wheezing, rhonchi or rales.   Musculoskeletal:         General: Swelling (Swelling on the volar side of her left wrist, tender to touch) present.      Right lower leg: No edema.      Left lower leg: No edema.   Neurological:      General: No focal deficit present.      Mental Status: She is alert and oriented to person, place, and time.   Psychiatric:         Mood and Affect: Mood normal.         Behavior: Behavior normal.         Thought Content: Thought " content normal.         Judgment: Judgment normal.       Brayden Zaman, DO

## 2024-04-10 ENCOUNTER — TELEPHONE (OUTPATIENT)
Dept: OBGYN CLINIC | Facility: CLINIC | Age: 59
End: 2024-04-10

## 2024-04-13 ENCOUNTER — APPOINTMENT (OUTPATIENT)
Dept: RADIOLOGY | Facility: MEDICAL CENTER | Age: 59
End: 2024-04-13
Payer: COMMERCIAL

## 2024-04-13 DIAGNOSIS — M25.552 LEFT HIP PAIN: ICD-10-CM

## 2024-04-13 DIAGNOSIS — M17.12 PATELLOFEMORAL ARTHRITIS OF LEFT KNEE: ICD-10-CM

## 2024-04-13 PROCEDURE — 73502 X-RAY EXAM HIP UNI 2-3 VIEWS: CPT

## 2024-04-13 PROCEDURE — 73562 X-RAY EXAM OF KNEE 3: CPT

## 2024-04-17 ENCOUNTER — TELEPHONE (OUTPATIENT)
Age: 59
End: 2024-04-17

## 2024-04-17 ENCOUNTER — OFFICE VISIT (OUTPATIENT)
Dept: OBGYN CLINIC | Facility: CLINIC | Age: 59
End: 2024-04-17
Payer: COMMERCIAL

## 2024-04-17 ENCOUNTER — APPOINTMENT (OUTPATIENT)
Dept: RADIOLOGY | Facility: MEDICAL CENTER | Age: 59
End: 2024-04-17
Payer: COMMERCIAL

## 2024-04-17 VITALS
SYSTOLIC BLOOD PRESSURE: 185 MMHG | HEIGHT: 68 IN | RESPIRATION RATE: 16 BRPM | WEIGHT: 152 LBS | HEART RATE: 76 BPM | BODY MASS INDEX: 23.04 KG/M2 | DIASTOLIC BLOOD PRESSURE: 96 MMHG

## 2024-04-17 DIAGNOSIS — M18.12 ARTHRITIS OF CARPOMETACARPAL (CMC) JOINT OF LEFT THUMB: ICD-10-CM

## 2024-04-17 DIAGNOSIS — M67.432 GANGLION CYST OF VOLAR ASPECT OF LEFT WRIST: Primary | ICD-10-CM

## 2024-04-17 PROCEDURE — 99204 OFFICE O/P NEW MOD 45 MIN: CPT | Performed by: FAMILY MEDICINE

## 2024-04-17 PROCEDURE — 73110 X-RAY EXAM OF WRIST: CPT

## 2024-04-17 NOTE — PROGRESS NOTES
Assessment:      left volar wrist ganglion.      Plan:      At this point since the patient is experiencing progressively worsening discomfort and disruption in her normal activities of daily living, a surgical consultation was recommended.  We discussed briefly aspiration at office visit and subsequent corticosteroid injection however given the high risk of recurrence patient declines.  Patient will follow-up with Dr. Rodriguez to discuss surgical consultation excision.        Subjective  :      Suki Mares is a 59 y.o. right hand-dominant female self-referred for evaluation and treatment of volar left wrist swelling. This has been present for several weeks however recently has become progressively more aggravating. This affects most every activity involving use of the hand, including work acitivty, typing.  The mass does change in size. The pain is currently rated moderate.    Outside reports reviewed: none.    The following portions of the patient's history were reviewed and updated as appropriate: allergies, current medications, past family history, past medical history, past social history, past surgical history, and problem list.    Review of Systems  Pertinent items are noted in HPI..      Objective:      Left Hand  Circulation:   well perfused, brisk capillary refill distal to the injury   Skin:   normal   Swelling:  Notable ganglion cyst volar aspect of the wrist   Deformity:  There is an obvious deformity of the hand.   Finger ROM:  normal   Wrist ROM:  wrist flexion and extension was normal   Resisted Wrist ROM:  did not exacerbate their wrist pain   Sensation:   intact to light touch   Tenderness:    Point tenderness to the volar aspect of the hand.   Mass:   2 cm by 1 cm, non pulsating mass. The mass was freely mobile and did not appear to be originating from a tendon sheath.     Imaging   2 views of the hand were obtained today. [facility] OA noted

## 2024-04-17 NOTE — TELEPHONE ENCOUNTER
Patient is being referred to a orthopedics. Please schedule accordingly.    [facility]   [phone]

## 2024-04-29 ENCOUNTER — OFFICE VISIT (OUTPATIENT)
Dept: GASTROENTEROLOGY | Facility: CLINIC | Age: 59
End: 2024-04-29
Payer: COMMERCIAL

## 2024-04-29 VITALS
WEIGHT: 153 LBS | OXYGEN SATURATION: 96 % | BODY MASS INDEX: 23.19 KG/M2 | TEMPERATURE: 97.3 F | DIASTOLIC BLOOD PRESSURE: 95 MMHG | HEIGHT: 68 IN | RESPIRATION RATE: 16 BRPM | SYSTOLIC BLOOD PRESSURE: 197 MMHG | HEART RATE: 91 BPM

## 2024-04-29 DIAGNOSIS — K21.9 GASTROESOPHAGEAL REFLUX DISEASE, UNSPECIFIED WHETHER ESOPHAGITIS PRESENT: Primary | ICD-10-CM

## 2024-04-29 DIAGNOSIS — Z80.0 FAMILY HISTORY OF COLON CANCER IN FATHER: ICD-10-CM

## 2024-04-29 DIAGNOSIS — Z12.11 SCREENING FOR COLON CANCER: ICD-10-CM

## 2024-04-29 DIAGNOSIS — K21.9 GASTROESOPHAGEAL REFLUX DISEASE WITHOUT ESOPHAGITIS: ICD-10-CM

## 2024-04-29 DIAGNOSIS — K52.9 POSTPRANDIAL DIARRHEA: ICD-10-CM

## 2024-04-29 PROCEDURE — 99244 OFF/OP CNSLTJ NEW/EST MOD 40: CPT | Performed by: STUDENT IN AN ORGANIZED HEALTH CARE EDUCATION/TRAINING PROGRAM

## 2024-04-29 RX ORDER — POLYETHYLENE GLYCOL 3350 17 G/17G
238 POWDER, FOR SOLUTION ORAL ONCE
Qty: 238 G | Refills: 0 | Status: SHIPPED | OUTPATIENT
Start: 2024-04-29 | End: 2024-04-29

## 2024-04-29 RX ORDER — BISACODYL 5 MG/1
5 TABLET, DELAYED RELEASE ORAL DAILY PRN
Qty: 30 TABLET | Refills: 0 | Status: SHIPPED | OUTPATIENT
Start: 2024-04-29

## 2024-04-29 RX ORDER — OMEPRAZOLE 40 MG/1
40 CAPSULE, DELAYED RELEASE ORAL
Qty: 30 CAPSULE | Refills: 2 | Status: SHIPPED | OUTPATIENT
Start: 2024-04-29

## 2024-04-29 NOTE — ASSESSMENT & PLAN NOTE
Possibly due to some degree of food sensitivity.  Possibly gluten intolerance versus other food sensitivity.  Seems to have most significant response to sesame seeds or other grains or seeds.    Plan for biopsies with upper endoscopy  Advised patient to be cognizant of food items that may cause symptoms

## 2024-04-29 NOTE — ASSESSMENT & PLAN NOTE
Father passed away from colon cancer.  Diagnosed possibly in his 70's.  No other family history of colon cancer.  Increased risk given her family history.    We will schedule for screening colonoscopy  Recommend 5-year intervals given her family history

## 2024-04-29 NOTE — PATIENT INSTRUCTIONS
We will schedule you for upper endoscopy (EGD) and colonoscopy  Please follow the instructions for the bowel prep  We will increase omeprazole to 40 mg daily  Take this 30 minutes before largest meal of the day        COLONOSCOPY  MIRALAX/Dulcolax Bowel Preparation Instructions    The OR/GI Lab will contact you the evening prior to your procedure with your exact arrival time.    Our practice requires a 1 week notice for any cancellations or rescheduling. We kindly ask that you immediately notify us of any changes including any new medications that are prescribed. Thank you for your cooperation.       WEEK BEFORE YOUR PROCEDURE:  Stop taking Iron tablets.  5 days prior, AVOID vegetables and fruits with skins or seeds, nuts, corn, popcorn and whole grain breads.   Purchase: One (1) 238-gram container of Miralax (polyethylene glycol 3350), four (4) 5 mg Dulcolax (bisacodyl) tablets, and one (1) 64-ounce bottle of Gatorade (sports drink) - no red, orange, or purple. These may be purchased at any pharmacy without a prescription. Generic products are permissible.   Arrange responsible transportation for day of the procedure.     DAY BEFORE THE PROCEDURE:   CLEAR liquids only for entire day prior. Nothing red, orange or purple.    You MAY have:                                                               Soda  Water  Broth Gatorade  Jello  Popsicles Coffee/tea without milk/creamer     YOU MAY NOT HAVE:  Solid foods   Milk and milk products    Juice with pulp    BOWEL PREPARATION:  Includes: One (1) 238-gram container of Miralax (polyethylene glycol 3350), four (4) 5 mg Dulcolax (bisacodyl) tablets, and one (1) 64-ounce bottle of Gatorade (sports drink).  Preparation may be refrigerated.  Entire bowel prep should be completed.     Afternoon before the procedure (2:00 pm - 5:00 pm):    Take two (2) 5 mg Dulcolax laxative tablets.     Evening before the procedure (6:00 pm):  Mix entire container of Miralax with one (1)  64-ounce bottle of Gatorade and shake until all medication is dissolved.   Begin drinking solution. Drink an eight (8) ounce cup every 10-15 minutes until you have consumed half (32 ounces) of the solution.  Refrigerate remaining solution.    Night before the procedure (8:00 pm):  Take two (2) 5 mg Dulcolax laxative tablets.     Beginning 5 hours before your procedure:  Drink the remaining amount of prepared solution (32 ounces).  Drink an eight (8) ounce cup every 10-15 minutes until you have consumed the remaining solution.     Bowel prep should be completed 4 hours prior to procedure time.    NOTHING TO EAT OR DRINK AFTER MIDNIGHT- EXCEPT FOR YOUR PREP    DAY OF THE PROCEDURE:  You may brush your teeth.  Leave all jewelry at home.  Please arrive for your procedure as indicated by the OR / GI Lab / Endoscopy Unit. The hospital will contact you the day before with your exact arrival time.   Make sure you have arranged ahead of time for a responsible adult (18 or older) to accompany and drive you home after the procedure.  Please discuss any transportation concerns with our staff prior to your procedure.    The effects of the anesthesia can persist for 24 hours.  After receiving the sedation, you must exercise caution before engaging in any activity that could harm yourself and others (such as driving a car).  Do not make any important decisions or do not drink any alcoholic beverages during this time period.  After your procedure, you may have anything you'd like to eat or drink.  You will probably want to start with something light.  Please include plenty of fluids.  Avoid items that cause gas such as sodas and salads.    SPECIAL INSTRUCTIONS:    For patients currently taking blood thinners and/or antiplatelet therapy our office will contact the prescribing provider.  Our office will contact you with any required changes to your medication regimen.     Blood thinner (i.e. - Coumadin, Pradaxa, Lovenox, Xarelto,  Eliquis)  ?  Continue (Do Not Stop)  ? Stop______________for_____________days prior to the procedure.    Antiplatelet (i.e. - Plavix, Aggrenox, Effient, Brilinta)  ?  Continue (Do Not Stop)  ? Stop______________for_____________days prior to the procedure.       Diabetes:   If you are Diabetic, please see separate Diabetic Instruction Sheet.          Prescribed medications:  Do not stop your aspirin, or any of your other medications (unless instructed otherwise).    Take the rest of your prescribed medications with small sips of water at least 2 hours prior to your procedure.      For any questions or concerns related to your bowel preparation or pre-procedure instructions, please contact our office at [phone].  Thank you for choosing [facility]!

## 2024-04-29 NOTE — ASSESSMENT & PLAN NOTE
Overdue for colon cancer screening.  No prior CRC screening.  Does have family history of colon cancer as noted above.  No significant alarming symptoms currently.  Not on antithrombotics or anticoagulants.    We will schedule for colonoscopy  MiraLAX/Dulcolax bowel prep  Recommend screening intervals at least every 5 years given her family history of colon cancer in her father    I obtained informed consent from the patient.  The risks/benefits/alternatives of the procedure were discussed with the patient.  Risks included, but not limited to, infection, bleeding, perforation, injury to organs in the abdomen, missed lesion and incomplete procedure were discussed.  Patient was agreeable and electronic signature was obtained.

## 2024-04-29 NOTE — ASSESSMENT & PLAN NOTE
Continues to breakthrough symptoms.  Could have underlying hiatal hernia or peptic ulcer disease contributing to her persistent symptoms.  Dietary habits could contribute as well.  Most breakthrough occurs in the evenings.  Takes omeprazole 20 mg in the morning.    We will schedule her for endoscopy for evaluation and assess for any underlying Quintana's given her long history of reflux  Increase omeprazole to 40 mg daily  Instructed patient to take this 30 minutes before dinner    I obtained informed consent from the patient.  The risks/benefits/alternatives of the procedure were discussed with the patient.  Risks included, but not limited to, infection, bleeding, perforation, injury to organs in the abdomen, missed lesion and incomplete procedure were discussed.  Patient was agreeable and electronic signature was obtained.

## 2024-04-29 NOTE — PROGRESS NOTES
[facility]  Outpatient Consultation  Encounter: [id number]     PATIENT INFO     Name: Suki Mares  YOB: 1965   Age: 59 y.o.   Sex: female   MRN: [id number]    ASSESSMENT & PLAN     Suki Mares is a 59 y.o. female with history of emphysema, GERD, tobacco use who presents to GI office for reflux and need for colon cancer screening.    Problem List Items Addressed This Visit       Gastroesophageal reflux disease without esophagitis - Primary     Continues to breakthrough symptoms.  Could have underlying hiatal hernia or peptic ulcer disease contributing to her persistent symptoms.  Dietary habits could contribute as well.  Most breakthrough occurs in the evenings.  Takes omeprazole 20 mg in the morning.    We will schedule her for endoscopy for evaluation and assess for any underlying Quintana's given her long history of reflux  Increase omeprazole to 40 mg daily  Instructed patient to take this 30 minutes before dinner    I obtained informed consent from the patient.  The risks/benefits/alternatives of the procedure were discussed with the patient.  Risks included, but not limited to, infection, bleeding, perforation, injury to organs in the abdomen, missed lesion and incomplete procedure were discussed.  Patient was agreeable and electronic signature was obtained.          Relevant Medications    omeprazole (PriLOSEC) 40 MG capsule    Screening for colon cancer     Overdue for colon cancer screening.  No prior CRC screening.  Does have family history of colon cancer as noted above.  No significant alarming symptoms currently.  Not on antithrombotics or anticoagulants.    We will schedule for colonoscopy  MiraLAX/Dulcolax bowel prep  Recommend screening intervals at least every 5 years given her family history of colon cancer in her father    I obtained informed consent from the patient.  The risks/benefits/alternatives of the procedure were discussed with the  patient.  Risks included, but not limited to, infection, bleeding, perforation, injury to organs in the abdomen, missed lesion and incomplete procedure were discussed.  Patient was agreeable and electronic signature was obtained.          Relevant Medications    polyethylene glycol (MiraLax) 17 GM/SCOOP powder    bisacodyl (DULCOLAX) 5 mg EC tablet    Other Relevant Orders    Colonoscopy    Family history of colon cancer in father     Father passed away from colon cancer.  Diagnosed possibly in his 70's.  No other family history of colon cancer.  Increased risk given her family history.    We will schedule for screening colonoscopy  Recommend 5-year intervals given her family history         Relevant Medications    polyethylene glycol (MiraLax) 17 GM/SCOOP powder    bisacodyl (DULCOLAX) 5 mg EC tablet    Other Relevant Orders    Colonoscopy    Postprandial diarrhea     Possibly due to some degree of food sensitivity.  Possibly gluten intolerance versus other food sensitivity.  Seems to have most significant response to sesame seeds or other grains or seeds.    Plan for biopsies with upper endoscopy  Advised patient to be cognizant of food items that may cause symptoms          Orders Placed This Encounter   Procedures    Colonoscopy    EGD       FOLLOW-UP: 6 months    HISTORY OF PRESENT ILLNESS       Suki Mares is a 59 y.o. female who presents to GI office for evaluation of reflux and need for colon cancer screening.  Patient is new to this office.  Patient has been referred by her PCP, Dr. Brayden Zaman.  She has history of emphysema, tobacco use, and GERD.    Patient reports long history of reflux.  She has been on omeprazole 20 mg daily which she takes in the morning.  She has breakthrough symptoms which typically occur in the evenings.  She reports episodes of heartburn and regurgitation.  Denies any dysphagia or odynophagia.  No prior endoscopy.  She does report some postprandial diarrhea which occurs  with certain food items, particularly sesame seeds.  She tolerates other foods without issues.      She reports when she consumes sesame seeds, she feels the urgency to go to the bathroom.  She may have similar urgency with other small seeds or grains.  Does have episodes of diarrhea after consuming sesame seeds.  Her bowel habits are otherwise normal with formed stools.  She is due for colon cancer screening.  She reports a family history of colon cancer in her father.  Possibly diagnosed in his 60s or 70s.  Denies any other family history of colon cancer.  She denies any alarming lower GI symptoms currently including unintentional weight loss, hematochezia, melena.  She has never had colon cancer screening.     ENDOSCOPIC HISTORY     UPPER ENDOSCOPY: None    COLONOSCOPY: None    REVIEW OF SYSTEMS     CONSTITUTIONAL: Denies any fever, chills, rigors, and weight loss  HEENT: No earache or tinnitus, denies hearing loss or visual disturbances  CARDIOVASCULAR: No chest pain or palpitations  RESPIRATORY: Denies any cough, hemoptysis, shortness of breath or dyspnea on exertion  GASTROINTESTINAL: As noted in the History of Present Illness  GENITOURINARY: No problems with urination, denies any hematuria or dysuria  NEUROLOGIC: No dizziness or vertigo, denies headaches   MUSCULOSKELETAL: Denies any muscle or joint pain   SKIN: Denies jaundice or itching  PSYCHOSOCIAL: Denies depression or anxiety, denies any recent memory loss     Historical Information   Past Medical History:   Diagnosis Date    Arthritis     RIGHT KNEE     COPD (chronic obstructive pulmonary disease) (HCC)     DVT (deep venous thrombosis) (HCC)     Sept 2020    Emphysema of lung (HCC)     GERD (gastroesophageal reflux disease)     Gestational hypertension     Nerve pain     Osteoarthritis     Pneumonia     2018    Pulmonary emphysema (HCC)     Thrombophlebitis     RIGHT LEG      Past Surgical History:   Procedure Laterality Date    APPENDECTOMY  1999     CHOLECYSTECTOMY      ENDOVASCULAR LASER THERAPY (EVLT)      Left    HAND SURGERY      [facility] ENDOVEN ABLTJ INCMPTNT VEIN XTR LASER 1ST VEIN Left 03/12/2021    Procedure: ENDOVASCULAR LASER THERAPY (EVLT) + Stab Phlebectomy (10-20);  Surgeon: Angel Johnston MD;  [facility];  Service: Vascular    [facility] ENDOVEN ABLTJ INCMPTNT VEIN XTR LASER 1ST VEIN Right 06/18/2021    Procedure: ENDOVASCULAR LASER THERAPY (EVLT) R GSV EVLT + Stab Phlebectomy (10-20);  Surgeon: Angel Johnston MD;  [facility];  Service: Vascular    [facility] NDSC WRST SURG W/RLS TRANSVRS CARPL LIGM Left 05/19/2017    Procedure: ENDOSCOPIC CARPAL TUNNEL RELEASE ;  Surgeon: Frederick Goff MD;  [facility];  Service: Orthopedics    TONSILLECTOMY       Social History   Social History     Substance and Sexual Activity   Alcohol Use Not Currently    Alcohol/week: 7.0 standard drinks of alcohol    Comment: Daily     Social History     Substance and Sexual Activity   Drug Use No     Social History     Tobacco Use   Smoking Status Former    Current packs/day: 0.25    Average packs/day: 1 pack/day for 31.1 years (30.4 ttl pk-yrs)    Types: Cigarettes    Start date: 11/13/1990    Quit date: 12/22/2020   Smokeless Tobacco Never   Tobacco Comments    About 5 cigs a day 1/12/24     Family History   Problem Relation Age of Onset    Cancer Father 80        thinks colon cancer    Heart attack Father 68    Heart disease Father     Heart attack Mother     Thyroid disease Mother     Atrial fibrillation Mother         pacemaker    Heart disease Mother     Lung cancer Sister         (they share a mother only)     No Known Problems Brother     No Known Problems Daughter     Thyroid disease Maternal Grandmother     Coronary artery disease Paternal Grandfather     No Known Problems Daughter     No Known Problems Paternal Aunt        MEDICATIONS & ALLERGIES     Current Outpatient Medications   Medication Instructions    albuterol (PROVENTIL  "HFA,VENTOLIN HFA) 90 mcg/act inhaler 1-2 puffs, Inhalation, Every 4 hours PRN    bisacodyl (DULCOLAX) 5 mg, Oral, Daily PRN    buPROPion (WELLBUTRIN SR) 150 mg, Oral, 2 times daily    cholecalciferol 2,000 Units, Oral, Daily, Stated she is taking it weekly    diclofenac sodium (VOLTAREN) 2 g, Topical, 4 times daily    ibuprofen (MOTRIN) 600 mg, Oral, Every 6 hours PRN    Multiple Vitamin (MULTI-VITAMIN DAILY PO) 1 tablet, Oral, Daily    omeprazole (PRILOSEC) 40 mg, Oral, Daily before dinner    polyethylene glycol (MIRALAX) 238 g, Oral, Once, Take 238 g my mouth. Use as directed    umeclidinium-vilanterol (Anoro Ellipta) 62.5-25 mcg/actuation inhaler 1 puff, Inhalation, Daily     Allergies   Allergen Reactions    Seasonal Ic  [Cholestatin]        PHYSICAL EXAM      Objective   Blood pressure (!) 197/95, pulse 91, temperature (!) 97.3 °F (36.3 °C), temperature source Tympanic, resp. rate 16, height 5' 8\" (1.727 m), weight 69.4 kg (153 lb), SpO2 96%. Body mass index is 23.26 kg/m².    General Appearance:   Alert, cooperative, no distress   HEENT:   Normocephalic, atraumatic, anicteric     Neck:   Supple, symmetrical, trachea midline   Lungs:   Equal chest rise, respirations unlabored    Heart:   Regular rate   Abdomen:   Soft, non-tender, non-distended; normal bowel sounds; no masses, no organomegaly    Rectal:   Deferred    Extremities:   No cyanosis or edema    Neuro:   Moves all 4 extremities    Skin:   No jaundice, rashes, or lesions       LABORATORY RESULTS     No visits with results within 1 Day(s) from this visit.   Latest known visit with results is:   Office Visit on 12/15/2023   Component Date Value    Vit D, 25-Hydroxy 03/19/2024 22.3 (L)     Cholesterol 03/19/2024 189     Triglycerides 03/19/2024 99     HDL, Direct 03/19/2024 69     LDL Calculated 03/19/2024 100     Sodium 03/19/2024 139     Potassium 03/19/2024 4.0     Chloride 03/19/2024 103     CO2 03/19/2024 26     ANION GAP 03/19/2024 10     BUN " 03/19/2024 17     Creatinine 03/19/2024 0.59 (L)     Glucose, Fasting 03/19/2024 101 (H)     Calcium 03/19/2024 9.2     eGFR 03/19/2024 99     TSH 3RD GENERATON 03/19/2024 2.121         IMAGING RESULTS     XR wrist 3+ vw left    Result Date: 4/18/2024  Narrative: LEFT WRIST INDICATION:   Unilateral primary osteoarthritis of first carpometacarpal joint, left hand. COMPARISON:  None. VIEWS:  XR WRIST 3+ VW LEFT FINDINGS: There is no acute fracture or dislocation. Moderate osteoarthritis at the first carpometacarpal joint and mild to moderate osteoarthritis of the triscaphe and TFCC joints No lytic or blastic osseous lesion. Soft tissues are unremarkable.     Impression: No acute osseous abnormality. Degenerative changes as described. Electronically signed: 04/18/2024 08:33 AM Livan Prescott MD    XR knee 3 vw left non injury    Result Date: 4/13/2024  Narrative: XR KNEE 3 VW LEFT NON INJURY INDICATION: M17.12: Unilateral primary osteoarthritis, left knee. COMPARISON: None FINDINGS: No acute fracture or dislocation. No joint effusion. No significant degenerative changes. No lytic or blastic osseous lesion. Unremarkable soft tissues.     Impression: No acute osseous abnormality. Workstation performed: [id number]     XR hip/pelv 2-3 vws left if performed    Result Date: 4/13/2024  Narrative: XR HIP/PELV 2-3 VWS LEFT  W PELVIS IF PERFORMED INDICATION: M25.552: Pain in left hip. COMPARISON: None FINDINGS: No acute fracture or dislocation. No significant hip degenerative changes. No lytic or blastic osseous lesion. Unremarkable soft tissues. Degenerative changes in the visualized lower lumbar spine.     Impression: No acute osseous abnormality. Workstation performed: [id number]     I have personally reviewed any available and pertinent imaging study reports.      Frederick Quiñones D.O.  [facility]  Division of Gastroenterology & Hepatology  Available on TigerText  [email or web address]    ** Please Note:  This note is constructed using a voice recognition dictation system. **

## 2024-04-30 NOTE — TELEPHONE ENCOUNTER
Patient is being referred to a orthopedics. Please schedule accordingly.    [facility]   [phone]

## 2024-05-07 NOTE — TELEPHONE ENCOUNTER
Patient is being referred to a orthopedics. Please schedule accordingly.    [facility]   [phone]

## 2024-05-08 DIAGNOSIS — F17.210 CIGARETTE NICOTINE DEPENDENCE WITHOUT COMPLICATION: ICD-10-CM

## 2024-05-08 RX ORDER — BUPROPION HYDROCHLORIDE 150 MG/1
150 TABLET, EXTENDED RELEASE ORAL 2 TIMES DAILY
Qty: 60 TABLET | Refills: 5 | Status: SHIPPED | OUTPATIENT
Start: 2024-05-08

## 2024-05-29 PROBLEM — Z12.11 SCREENING FOR COLON CANCER: Status: RESOLVED | Noted: 2024-04-29 | Resolved: 2024-05-29

## 2024-06-26 ENCOUNTER — PREP FOR PROCEDURE (OUTPATIENT)
Dept: GASTROENTEROLOGY | Facility: CLINIC | Age: 59
End: 2024-06-26

## 2024-06-26 ENCOUNTER — VBI (OUTPATIENT)
Dept: ADMINISTRATIVE | Facility: OTHER | Age: 59
End: 2024-06-26

## 2024-06-26 ENCOUNTER — ANESTHESIA EVENT (OUTPATIENT)
Dept: PERIOP | Facility: HOSPITAL | Age: 59
End: 2024-06-26

## 2024-06-26 ENCOUNTER — ANESTHESIA (OUTPATIENT)
Dept: PERIOP | Facility: HOSPITAL | Age: 59
End: 2024-06-26

## 2024-06-26 ENCOUNTER — HOSPITAL ENCOUNTER (OUTPATIENT)
Dept: PERIOP | Facility: HOSPITAL | Age: 59
Setting detail: OUTPATIENT SURGERY
Discharge: HOME | End: 2024-06-26
Attending: STUDENT IN AN ORGANIZED HEALTH CARE EDUCATION/TRAINING PROGRAM
Payer: COMMERCIAL

## 2024-06-26 VITALS
TEMPERATURE: 96.8 F | RESPIRATION RATE: 18 BRPM | HEART RATE: 67 BPM | DIASTOLIC BLOOD PRESSURE: 81 MMHG | OXYGEN SATURATION: 99 % | SYSTOLIC BLOOD PRESSURE: 176 MMHG

## 2024-06-26 DIAGNOSIS — K21.9 GASTROESOPHAGEAL REFLUX DISEASE, UNSPECIFIED WHETHER ESOPHAGITIS PRESENT: ICD-10-CM

## 2024-06-26 DIAGNOSIS — D49.0 CECAL NEOPLASM: Primary | ICD-10-CM

## 2024-06-26 DIAGNOSIS — Z12.11 SCREENING FOR COLON CANCER: ICD-10-CM

## 2024-06-26 DIAGNOSIS — Z80.0 FAMILY HISTORY OF COLON CANCER IN FATHER: ICD-10-CM

## 2024-06-26 LAB — CEA SERPL-MCNC: 12.3 NG/ML (ref 0–3)

## 2024-06-26 PROCEDURE — 45385 COLONOSCOPY W/LESION REMOVAL: CPT | Performed by: STUDENT IN AN ORGANIZED HEALTH CARE EDUCATION/TRAINING PROGRAM

## 2024-06-26 PROCEDURE — 88305 TISSUE EXAM BY PATHOLOGIST: CPT | Performed by: PATHOLOGY

## 2024-06-26 PROCEDURE — 43239 EGD BIOPSY SINGLE/MULTIPLE: CPT | Performed by: STUDENT IN AN ORGANIZED HEALTH CARE EDUCATION/TRAINING PROGRAM

## 2024-06-26 PROCEDURE — 82378 CARCINOEMBRYONIC ANTIGEN: CPT | Performed by: STUDENT IN AN ORGANIZED HEALTH CARE EDUCATION/TRAINING PROGRAM

## 2024-06-26 PROCEDURE — 45380 COLONOSCOPY AND BIOPSY: CPT | Performed by: STUDENT IN AN ORGANIZED HEALTH CARE EDUCATION/TRAINING PROGRAM

## 2024-06-26 RX ORDER — SODIUM CHLORIDE, SODIUM LACTATE, POTASSIUM CHLORIDE, CALCIUM CHLORIDE 600; 310; 30; 20 MG/100ML; MG/100ML; MG/100ML; MG/100ML
INJECTION, SOLUTION INTRAVENOUS CONTINUOUS PRN
Status: DISCONTINUED | OUTPATIENT
Start: 2024-06-26 | End: 2024-06-26

## 2024-06-26 RX ORDER — SODIUM CHLORIDE, SODIUM LACTATE, POTASSIUM CHLORIDE, CALCIUM CHLORIDE 600; 310; 30; 20 MG/100ML; MG/100ML; MG/100ML; MG/100ML
125 INJECTION, SOLUTION INTRAVENOUS CONTINUOUS
Status: DISCONTINUED | OUTPATIENT
Start: 2024-06-26 | End: 2024-06-30 | Stop reason: HOSPADM

## 2024-06-26 RX ORDER — PROPOFOL 10 MG/ML
INJECTION, EMULSION INTRAVENOUS AS NEEDED
Status: DISCONTINUED | OUTPATIENT
Start: 2024-06-26 | End: 2024-06-26

## 2024-06-26 RX ORDER — SODIUM CHLORIDE, SODIUM LACTATE, POTASSIUM CHLORIDE, CALCIUM CHLORIDE 600; 310; 30; 20 MG/100ML; MG/100ML; MG/100ML; MG/100ML
125 INJECTION, SOLUTION INTRAVENOUS CONTINUOUS
Status: CANCELLED | OUTPATIENT
Start: 2024-06-26

## 2024-06-26 RX ORDER — BISACODYL 5 MG/1
20 TABLET, DELAYED RELEASE ORAL ONCE
Qty: 4 TABLET | Refills: 0 | Status: SHIPPED | OUTPATIENT
Start: 2024-06-26 | End: 2024-06-26

## 2024-06-26 RX ORDER — POLYETHYLENE GLYCOL 3350 17 G/17G
238 POWDER, FOR SOLUTION ORAL ONCE
Qty: 238 G | Refills: 0 | Status: SHIPPED | OUTPATIENT
Start: 2024-06-26 | End: 2024-06-26

## 2024-06-26 RX ORDER — LIDOCAINE HYDROCHLORIDE 20 MG/ML
INJECTION, SOLUTION EPIDURAL; INFILTRATION; INTRACAUDAL; PERINEURAL AS NEEDED
Status: DISCONTINUED | OUTPATIENT
Start: 2024-06-26 | End: 2024-06-26

## 2024-06-26 RX ADMIN — PROPOFOL 150 MG: 10 INJECTION, EMULSION INTRAVENOUS at 10:12

## 2024-06-26 RX ADMIN — PROPOFOL 50 MG: 10 INJECTION, EMULSION INTRAVENOUS at 10:22

## 2024-06-26 RX ADMIN — PROPOFOL 100 MCG/KG/MIN: 10 INJECTION, EMULSION INTRAVENOUS at 10:25

## 2024-06-26 RX ADMIN — SODIUM CHLORIDE, SODIUM LACTATE, POTASSIUM CHLORIDE, AND CALCIUM CHLORIDE: .6; .31; .03; .02 INJECTION, SOLUTION INTRAVENOUS at 10:09

## 2024-06-26 RX ADMIN — LIDOCAINE HYDROCHLORIDE 100 MG: 20 INJECTION, SOLUTION EPIDURAL; INFILTRATION; INTRACAUDAL; PERINEURAL at 10:12

## 2024-06-26 RX ADMIN — SODIUM CHLORIDE, SODIUM LACTATE, POTASSIUM CHLORIDE, AND CALCIUM CHLORIDE 125 ML/HR: .6; .31; .03; .02 INJECTION, SOLUTION INTRAVENOUS at 09:01

## 2024-06-26 NOTE — TELEPHONE ENCOUNTER
06/26/24 12:46 PM     Chart reviewed for Pap Smear (HPV) aka Cervical Cancer Screening ; nothing is submitted to the patient's insurance at this time.     Francisco Vásquez MA   [facility]

## 2024-06-26 NOTE — ANESTHESIA PREPROCEDURE EVALUATION
Procedure:  COLONOSCOPY  EGD    Relevant Problems   GI/HEPATIC   (+) Gastroesophageal reflux disease without esophagitis      MUSCULOSKELETAL   (+) Patellofemoral arthritis of left knee      PULMONARY   (+) Centrilobular emphysema (HCC)   (+) Smoker        Physical Exam    Airway    Mallampati score: II  TM Distance: >3 FB  Neck ROM: full     Dental   No notable dental hx     Cardiovascular      Pulmonary      Other Findings  post-pubertal.      Anesthesia Plan  ASA Score- 2     Anesthesia Type- IV sedation with anesthesia with ASA Monitors.         Additional Monitors:     Airway Plan:            Plan Factors-Exercise tolerance (METS): >4 METS.    Chart reviewed. EKG reviewed.   Patient summary reviewed.    Patient is not a current smoker.      There is medical exclusion for perioperative obstructive sleep apnea risk education.        Induction- intravenous.    Postoperative Plan-         Informed Consent- Anesthetic plan and risks discussed with patient.  I personally reviewed this patient with the CRNA. Discussed and agreed on the Anesthesia Plan with the CRNA..

## 2024-06-26 NOTE — H&P
[facility]  History & Physical     PATIENT INFO     Name: Suki Mares  YOB: 1965   Age: 59 y.o.   Sex: female   MRN: [id number]     HISTORY OF PRESENT ILLNESS     Suki Mares is a 59 y.o. year old female who presents for colonoscopy for GERD, colon cancer screening, family history of colon cancer.  No prior colonoscopy.  No antithrombotics or anticoagulants.     REVIEW OF SYSTEMS     Per the HPI, and otherwise unremarkable.    Historical Information   Past Medical History:   Diagnosis Date    Arthritis     RIGHT KNEE     COPD (chronic obstructive pulmonary disease) (HCC)     DVT (deep venous thrombosis) (HCC)     Sept 2020    Emphysema of lung (HCC)     GERD (gastroesophageal reflux disease)     Gestational hypertension     Nerve pain     Osteoarthritis     Pneumonia     2018    Pulmonary emphysema (HCC)     Thrombophlebitis     RIGHT LEG      Past Surgical History:   Procedure Laterality Date    APPENDECTOMY  1999    CHOLECYSTECTOMY      ENDOVASCULAR LASER THERAPY (EVLT)      Left    HAND SURGERY      [facility] ENDOVEN ABLTJ INCMPTNT VEIN XTR LASER 1ST VEIN Left 03/12/2021    Procedure: ENDOVASCULAR LASER THERAPY (EVLT) + Stab Phlebectomy (10-20);  Surgeon: Angel Johnston MD;  [facility];  Service: Vascular    [facility] ENDOVEN ABLTJ INCMPTNT VEIN XTR LASER 1ST VEIN Right 06/18/2021    Procedure: ENDOVASCULAR LASER THERAPY (EVLT) R GSV EVLT + Stab Phlebectomy (10-20);  Surgeon: Angel Johnston MD;  [facility];  Service: Vascular    KS NDSC WRST SURG W/RLS TRANSVRS CARPL LIGM Left 05/19/2017    Procedure: ENDOSCOPIC CARPAL TUNNEL RELEASE ;  Surgeon: Frederick Goff MD;  [facility];  Service: Orthopedics    TONSILLECTOMY       Social History   Social History     Substance and Sexual Activity   Alcohol Use Not Currently    Alcohol/week: 7.0 standard drinks of alcohol    Comment: Daily     Social History     Substance and Sexual Activity    Drug Use No     Social History     Tobacco Use   Smoking Status Former    Current packs/day: 0.25    Average packs/day: 1 pack/day for 31.3 years (30.4 ttl pk-yrs)    Types: Cigarettes    Start date: 11/13/1990    Quit date: 12/22/2020   Smokeless Tobacco Never   Tobacco Comments    Attempting to quit-4/24     Family History   Problem Relation Age of Onset    Cancer Father 80        thinks colon cancer    Heart attack Father 68    Heart disease Father     Heart attack Mother     Thyroid disease Mother     Atrial fibrillation Mother         pacemaker    Heart disease Mother     Lung cancer Sister         (they share a mother only)     No Known Problems Brother     No Known Problems Daughter     Thyroid disease Maternal Grandmother     Coronary artery disease Paternal Grandfather     No Known Problems Daughter     No Known Problems Paternal Aunt         MEDICATIONS & ALLERGIES     Current Outpatient Medications   Medication Instructions    albuterol (PROVENTIL HFA,VENTOLIN HFA) 90 mcg/act inhaler 1-2 puffs, Inhalation, Every 4 hours PRN    bisacodyl (DULCOLAX) 5 mg, Oral, Daily PRN    buPROPion (WELLBUTRIN SR) 150 mg, Oral, 2 times daily    cholecalciferol (VITAMIN D3) 2,000 Units, Oral, Daily    diclofenac sodium (VOLTAREN) 2 g, 4 times daily    ibuprofen (MOTRIN) 600 mg, Oral, Every 6 hours PRN    Multiple Vitamin (MULTI-VITAMIN DAILY PO) 1 tablet, Oral, Daily    omeprazole (PRILOSEC) 40 mg, Oral, Daily before dinner    polyethylene glycol (MIRALAX) 238 g, Oral, Once, Take 238 g my mouth. Use as directed    umeclidinium-vilanterol (Anoro Ellipta) 62.5-25 mcg/actuation inhaler 1 puff, Inhalation, Daily     Allergies   Allergen Reactions    Seasonal Ic  [Cholestatin]         PHYSICAL EXAM      Objective   Blood pressure 133/77, pulse 95, temperature (!) 97 °F (36.1 °C), temperature source Temporal, resp. rate 18, SpO2 95%. There is no height or weight on file to calculate BMI.    General Appearance:   Alert,  cooperative, no distress   Lungs:   Equal chest rise, respirations unlabored    Heart:   Regular rate and rhythm   Abdomen:   Soft, non-tender, non-distended; normal bowel sounds; no masses, no organomegaly    Extremities:   No edema       ASSESSMENT & PLAN     This is a 59 y.o. year old female here for EGD and colonoscopy, and she is stable and optimized for her procedure.      Frederick Quiñones D.O.  [facility]  Division of Gastroenterology & Hepatology  Available on [employer]  [email or web address]    ** Please Note: This note is constructed using a voice recognition dictation system. **

## 2024-06-26 NOTE — ANESTHESIA POSTPROCEDURE EVALUATION
Post-Op Assessment Note    CV Status:  Stable    Pain management: satisfactory to patient       Mental Status:  Sleepy   Hydration Status:  Euvolemic   PONV Controlled:  Controlled   Airway Patency:  Patent     Post Op Vitals Reviewed: Yes    No anethesia notable event occurred.    Staff: CRNA               BP   122/65   Temp      Pulse  60   Resp   17   SpO2   100

## 2024-06-28 PROCEDURE — 88305 TISSUE EXAM BY PATHOLOGIST: CPT | Performed by: PATHOLOGY

## 2024-07-03 DIAGNOSIS — D49.0 CECAL NEOPLASM: Primary | ICD-10-CM

## 2024-07-03 NOTE — H&P (VIEW-ONLY)
Ambulatory Visit  Name: Suki Mares      : 1965      MRN: [id number]  Encounter Provider: Marti Eugene MD  Encounter Date: 2024   Encounter department: [facility] COLON AND RECTAL SURGERY [place]    Assessment & Plan   1. Cecal neoplasm  -     Ambulatory Referral to Colorectal Surgery  -     CT chest abdomen pelvis w contrast; Future; Expected date: 2024  -     Case request operating room: RESECTION COLON RIGHT LAPAROSCOPIC HAND-ASSISTED; Standing  -     Basic metabolic panel; Future  -     CBC and Platelet; Future  -     EKG 12 lead; Future  -     Protime-INR; Future  -     APTT; Future  -     Type and screen; Future  -     Case request operating room: RESECTION COLON RIGHT LAPAROSCOPIC HAND-ASSISTED  -     metroNIDAZOLE (FLAGYL) 500 mg tablet; Take 1 tablet (500 mg total) by mouth 2 (two) times a day for 1 day Take 1 tablet at 1:00pm and 1 tablet at 10:00pm the day prior to surgery.  -     neomycin (MYCIFRADIN) 500 mg tablet; Take 2 tablets (1,000 mg total) by mouth 2 (two) times a day for 2 doses Take 2 tablets at 1:00pm and 2 tablets at 10:00pm the day prior to surgery.  -     Ambulatory Referral to Surgical Optimization; Future  Will attempt to obtain CT chest abdomen pelvis for staging purposes  Discussed the risk, benefits, and alternatives of laparoscopic right hemicolectomy, and patient is agreeable to proceed  We discussed postoperative recovery and hospitalization  I stressed the importance of smoking cessation  Will need preoperative bowel prep and referral to the surgical optimization clinic  Follow-up in office 3 to 4 weeks after surgery    History of Present Illness     Suki Mares is a 59 y.o. female who presents referred by Dr. 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     The patient notes diarrhea (3 liquid bowel movements a day) and lower abdominal pain since colonoscopy.     Last colonoscopy/EGD performed on 2024 by Dr. Quiñones, colonoscopy showed: 1)  One Angélica Is LST-G polyp measuring 20 mm or greater in the cecum; performed cold forceps biopsy.  2) One Angélica Is polyp measuring smaller than 5 mm in the sigmoid colon; performed cold snare removal.   3) Small, flat hemorrhoids.  4) The ileocecal valve, ascending colon, hepatic flexure, transverse colon, splenic flexure and descending colon appeared normal.     Colonoscopy pathology:  C. Colon, “Cold forcep biopsy of cecal mass,” Biopsy:  - Tubular adenoma (see note)  - Negative for high grade dysplasia or carcinoma  D. Colon, “Sigmoid colon cold snare polyp x 1,” Biopsy:  - Fragments of tubular adenoma and hyperplastic polyp  - Negative for high grade dysplasia or carcinoma      Family history of colon cancer in father and maternal aunt.       Lab Results   Component Value Date    CEA 12.3 (H) 06/26/2024     Review of Systems   Constitutional:  Negative for chills and fever.   HENT:  Negative for ear pain and sore throat.    Eyes:  Negative for pain and visual disturbance.   Respiratory:  Negative for cough and shortness of breath.    Cardiovascular:  Negative for chest pain and palpitations.   Gastrointestinal:  Negative for abdominal pain and vomiting.   Genitourinary:  Negative for dysuria and hematuria.   Musculoskeletal:  Negative for arthralgias and back pain.   Skin:  Negative for color change and rash.   Neurological:  Negative for seizures and syncope.   All other systems reviewed and are negative.    Past Medical History   Past Medical History:   Diagnosis Date    Arthritis     RIGHT KNEE     COPD (chronic obstructive pulmonary disease) (HCC)     DVT (deep venous thrombosis) (HCC)     Sept 2020    Emphysema of lung (HCC)     GERD (gastroesophageal reflux disease)     Gestational hypertension     Nerve pain     Osteoarthritis     Pneumonia     2018    Pulmonary emphysema (HCC)     Thrombophlebitis     RIGHT LEG      Past Surgical History:   Procedure Laterality Date    APPENDECTOMY  1999     CHOLECYSTECTOMY      ENDOVASCULAR LASER THERAPY (EVLT)      Left    HAND SURGERY      NV ENDOVEN ABLTJ INCMPTNT VEIN XTR LASER 1ST VEIN Left 03/12/2021    Procedure: ENDOVASCULAR LASER THERAPY (EVLT) + Stab Phlebectomy (10-20);  Surgeon: Angel Johnston MD;  [facility];  Service: Vascular    NV ENDOVEN ABLTJ INCMPTNT VEIN XTR LASER 1ST VEIN Right 06/18/2021    Procedure: ENDOVASCULAR LASER THERAPY (EVLT) R GSV EVLT + Stab Phlebectomy (10-20);  Surgeon: Angel Johnston MD;  [facility];  Service: Vascular    NV NDSC WRST SURG W/RLS TRANSVRS CARPL LIGM Left 05/19/2017    Procedure: ENDOSCOPIC CARPAL TUNNEL RELEASE ;  Surgeon: Frederick Goff MD;  [facility];  Service: Orthopedics    TONSILLECTOMY       Family History   Problem Relation Age of Onset    Heart attack Mother     Thyroid disease Mother     Atrial fibrillation Mother         pacemaker    Heart disease Mother     Colon cancer Father     Heart attack Father 68    Heart disease Father     Lung cancer Sister         (they share a mother only)     No Known Problems Brother     No Known Problems Daughter     No Known Problems Daughter     Colon cancer Maternal Aunt     No Known Problems Paternal Aunt     Thyroid disease Maternal Grandmother     Coronary artery disease Paternal Grandfather      Current Outpatient Medications on File Prior to Visit   Medication Sig Dispense Refill    albuterol (PROVENTIL HFA,VENTOLIN HFA) 90 mcg/act inhaler Inhale 1-2 puffs every 4 (four) hours as needed for shortness of breath or wheezing 8 g 3    cholecalciferol (VITAMIN D3) 1,000 units tablet Take 2,000 Units by mouth daily      Ergocalciferol (VITAMIN D2 PO) Take by mouth      Ergocalciferol (Vitamin D2) 50 MCG (2000 UT) TABS Take by mouth      ibuprofen (MOTRIN) 600 mg tablet Take 1 tablet by mouth every 6 (six) hours as needed for mild pain or moderate pain for up to 30 days 10 tablet 0    Multiple Vitamin (MULTI-VITAMIN DAILY PO) Take 1  tablet by mouth daily      omeprazole (PriLOSEC) 40 MG capsule Take 1 capsule (40 mg total) by mouth daily before dinner 30 capsule 2    umeclidinium-vilanterol (Anoro Ellipta) 62.5-25 mcg/actuation inhaler Inhale 1 puff daily 60 each 11    bisacodyl (DULCOLAX) 5 mg EC tablet Take 1 tablet (5 mg total) by mouth daily as needed for constipation (Patient not taking: Reported on 7/8/2024) 30 tablet 0    bisacodyl (DULCOLAX) 5 mg EC tablet Take 4 tablets (20 mg total) by mouth once for 1 dose 4 tablet 0    buPROPion (WELLBUTRIN SR) 150 mg 12 hr tablet Take 1 tablet by mouth twice daily (Patient not taking: Reported on 6/26/2024) 60 tablet 5    diclofenac sodium (VOLTAREN) 1 % Apply 2 g topically 4 (four) times a day (Patient not taking: Reported on 6/26/2024)      polyethylene glycol (MiraLax) 17 GM/SCOOP powder Take 238 g by mouth once for 1 dose Take 238 g my mouth. Use as directed 238 g 0     No current facility-administered medications on file prior to visit.     Allergies   Allergen Reactions    Seasonal Ic  [Cholestatin]       Objective     There were no vitals taken for this visit.    Physical Exam  Vitals and nursing note reviewed.   Constitutional:       General: She is not in acute distress.     Appearance: She is well-developed.   HENT:      Head: Normocephalic and atraumatic.   Eyes:      Conjunctiva/sclera: Conjunctivae normal.   Cardiovascular:      Rate and Rhythm: Normal rate and regular rhythm.      Heart sounds: No murmur heard.  Pulmonary:      Effort: Pulmonary effort is normal. No respiratory distress.      Breath sounds: Normal breath sounds.   Abdominal:      Palpations: Abdomen is soft.      Tenderness: There is no abdominal tenderness.   Musculoskeletal:         General: No swelling.      Cervical back: Neck supple.   Skin:     General: Skin is warm and dry.      Capillary Refill: Capillary refill takes less than 2 seconds.   Neurological:      Mental Status: She is alert.   Psychiatric:          Mood and Affect: Mood normal.       Administrative Statements   I have spent a total time of 32 minutes in caring for this patient on the day of the visit/encounter including Diagnostic results, Prognosis, Risks and benefits of tx options, Instructions for management, Patient and family education, Importance of tx compliance, Risk factor reductions, Impressions, Counseling / Coordination of care, Documenting in the medical record, Reviewing / ordering tests, medicine, procedures  , Obtaining or reviewing history  , and Communicating with other healthcare professionals .

## 2024-07-03 NOTE — PROGRESS NOTES
Ambulatory Visit  Name: Suki Mares      : 1965      MRN: [id number]  Encounter Provider: Marti Eugene MD  Encounter Date: 2024   Encounter department: [facility] COLON AND RECTAL SURGERY [place]    Assessment & Plan   1. Cecal neoplasm  -     Ambulatory Referral to Colorectal Surgery  -     CT chest abdomen pelvis w contrast; Future; Expected date: 2024  -     Case request operating room: RESECTION COLON RIGHT LAPAROSCOPIC HAND-ASSISTED; Standing  -     Basic metabolic panel; Future  -     CBC and Platelet; Future  -     EKG 12 lead; Future  -     Protime-INR; Future  -     APTT; Future  -     Type and screen; Future  -     Case request operating room: RESECTION COLON RIGHT LAPAROSCOPIC HAND-ASSISTED  -     metroNIDAZOLE (FLAGYL) 500 mg tablet; Take 1 tablet (500 mg total) by mouth 2 (two) times a day for 1 day Take 1 tablet at 1:00pm and 1 tablet at 10:00pm the day prior to surgery.  -     neomycin (MYCIFRADIN) 500 mg tablet; Take 2 tablets (1,000 mg total) by mouth 2 (two) times a day for 2 doses Take 2 tablets at 1:00pm and 2 tablets at 10:00pm the day prior to surgery.  -     Ambulatory Referral to Surgical Optimization; Future  Will attempt to obtain CT chest abdomen pelvis for staging purposes  Discussed the risk, benefits, and alternatives of laparoscopic right hemicolectomy, and patient is agreeable to proceed  We discussed postoperative recovery and hospitalization  I stressed the importance of smoking cessation  Will need preoperative bowel prep and referral to the surgical optimization clinic  Follow-up in office 3 to 4 weeks after surgery    History of Present Illness     Suki Mares is a 59 y.o. female who presents referred by Dr. 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     The patient notes diarrhea (3 liquid bowel movements a day) and lower abdominal pain since colonoscopy.     Last colonoscopy/EGD performed on 2024 by Dr. Quiñones, colonoscopy showed: 1)  One Angélica Is LST-G polyp measuring 20 mm or greater in the cecum; performed cold forceps biopsy.  2) One Angélica Is polyp measuring smaller than 5 mm in the sigmoid colon; performed cold snare removal.   3) Small, flat hemorrhoids.  4) The ileocecal valve, ascending colon, hepatic flexure, transverse colon, splenic flexure and descending colon appeared normal.     Colonoscopy pathology:  C. Colon, “Cold forcep biopsy of cecal mass,” Biopsy:  - Tubular adenoma (see note)  - Negative for high grade dysplasia or carcinoma  D. Colon, “Sigmoid colon cold snare polyp x 1,” Biopsy:  - Fragments of tubular adenoma and hyperplastic polyp  - Negative for high grade dysplasia or carcinoma      Family history of colon cancer in father and maternal aunt.       Lab Results   Component Value Date    CEA 12.3 (H) 06/26/2024     Review of Systems   Constitutional:  Negative for chills and fever.   HENT:  Negative for ear pain and sore throat.    Eyes:  Negative for pain and visual disturbance.   Respiratory:  Negative for cough and shortness of breath.    Cardiovascular:  Negative for chest pain and palpitations.   Gastrointestinal:  Negative for abdominal pain and vomiting.   Genitourinary:  Negative for dysuria and hematuria.   Musculoskeletal:  Negative for arthralgias and back pain.   Skin:  Negative for color change and rash.   Neurological:  Negative for seizures and syncope.   All other systems reviewed and are negative.    Past Medical History   Past Medical History:   Diagnosis Date    Arthritis     RIGHT KNEE     COPD (chronic obstructive pulmonary disease) (HCC)     DVT (deep venous thrombosis) (HCC)     Sept 2020    Emphysema of lung (HCC)     GERD (gastroesophageal reflux disease)     Gestational hypertension     Nerve pain     Osteoarthritis     Pneumonia     2018    Pulmonary emphysema (HCC)     Thrombophlebitis     RIGHT LEG      Past Surgical History:   Procedure Laterality Date    APPENDECTOMY  1999     CHOLECYSTECTOMY      ENDOVASCULAR LASER THERAPY (EVLT)      Left    HAND SURGERY      OK ENDOVEN ABLTJ INCMPTNT VEIN XTR LASER 1ST VEIN Left 03/12/2021    Procedure: ENDOVASCULAR LASER THERAPY (EVLT) + Stab Phlebectomy (10-20);  Surgeon: Angel Johnston MD;  [facility];  Service: Vascular    OK ENDOVEN ABLTJ INCMPTNT VEIN XTR LASER 1ST VEIN Right 06/18/2021    Procedure: ENDOVASCULAR LASER THERAPY (EVLT) R GSV EVLT + Stab Phlebectomy (10-20);  Surgeon: Angel Johnston MD;  [facility];  Service: Vascular    OK NDSC WRST SURG W/RLS TRANSVRS CARPL LIGM Left 05/19/2017    Procedure: ENDOSCOPIC CARPAL TUNNEL RELEASE ;  Surgeon: Frederick Goff MD;  [facility];  Service: Orthopedics    TONSILLECTOMY       Family History   Problem Relation Age of Onset    Heart attack Mother     Thyroid disease Mother     Atrial fibrillation Mother         pacemaker    Heart disease Mother     Colon cancer Father     Heart attack Father 68    Heart disease Father     Lung cancer Sister         (they share a mother only)     No Known Problems Brother     No Known Problems Daughter     No Known Problems Daughter     Colon cancer Maternal Aunt     No Known Problems Paternal Aunt     Thyroid disease Maternal Grandmother     Coronary artery disease Paternal Grandfather      Current Outpatient Medications on File Prior to Visit   Medication Sig Dispense Refill    albuterol (PROVENTIL HFA,VENTOLIN HFA) 90 mcg/act inhaler Inhale 1-2 puffs every 4 (four) hours as needed for shortness of breath or wheezing 8 g 3    cholecalciferol (VITAMIN D3) 1,000 units tablet Take 2,000 Units by mouth daily      Ergocalciferol (VITAMIN D2 PO) Take by mouth      Ergocalciferol (Vitamin D2) 50 MCG (2000 UT) TABS Take by mouth      ibuprofen (MOTRIN) 600 mg tablet Take 1 tablet by mouth every 6 (six) hours as needed for mild pain or moderate pain for up to 30 days 10 tablet 0    Multiple Vitamin (MULTI-VITAMIN DAILY PO) Take 1  tablet by mouth daily      omeprazole (PriLOSEC) 40 MG capsule Take 1 capsule (40 mg total) by mouth daily before dinner 30 capsule 2    umeclidinium-vilanterol (Anoro Ellipta) 62.5-25 mcg/actuation inhaler Inhale 1 puff daily 60 each 11    bisacodyl (DULCOLAX) 5 mg EC tablet Take 1 tablet (5 mg total) by mouth daily as needed for constipation (Patient not taking: Reported on 7/8/2024) 30 tablet 0    bisacodyl (DULCOLAX) 5 mg EC tablet Take 4 tablets (20 mg total) by mouth once for 1 dose 4 tablet 0    buPROPion (WELLBUTRIN SR) 150 mg 12 hr tablet Take 1 tablet by mouth twice daily (Patient not taking: Reported on 6/26/2024) 60 tablet 5    diclofenac sodium (VOLTAREN) 1 % Apply 2 g topically 4 (four) times a day (Patient not taking: Reported on 6/26/2024)      polyethylene glycol (MiraLax) 17 GM/SCOOP powder Take 238 g by mouth once for 1 dose Take 238 g my mouth. Use as directed 238 g 0     No current facility-administered medications on file prior to visit.     Allergies   Allergen Reactions    Seasonal Ic  [Cholestatin]       Objective     There were no vitals taken for this visit.    Physical Exam  Vitals and nursing note reviewed.   Constitutional:       General: She is not in acute distress.     Appearance: She is well-developed.   HENT:      Head: Normocephalic and atraumatic.   Eyes:      Conjunctiva/sclera: Conjunctivae normal.   Cardiovascular:      Rate and Rhythm: Normal rate and regular rhythm.      Heart sounds: No murmur heard.  Pulmonary:      Effort: Pulmonary effort is normal. No respiratory distress.      Breath sounds: Normal breath sounds.   Abdominal:      Palpations: Abdomen is soft.      Tenderness: There is no abdominal tenderness.   Musculoskeletal:         General: No swelling.      Cervical back: Neck supple.   Skin:     General: Skin is warm and dry.      Capillary Refill: Capillary refill takes less than 2 seconds.   Neurological:      Mental Status: She is alert.   Psychiatric:          Mood and Affect: Mood normal.       Administrative Statements   I have spent a total time of 32 minutes in caring for this patient on the day of the visit/encounter including Diagnostic results, Prognosis, Risks and benefits of tx options, Instructions for management, Patient and family education, Importance of tx compliance, Risk factor reductions, Impressions, Counseling / Coordination of care, Documenting in the medical record, Reviewing / ordering tests, medicine, procedures  , Obtaining or reviewing history  , and Communicating with other healthcare professionals .

## 2024-07-08 ENCOUNTER — OFFICE VISIT (OUTPATIENT)
Age: 59
End: 2024-07-08
Payer: COMMERCIAL

## 2024-07-08 VITALS — BODY MASS INDEX: 23.04 KG/M2 | WEIGHT: 152 LBS | HEIGHT: 68 IN

## 2024-07-08 DIAGNOSIS — D49.0 CECAL NEOPLASM: Primary | ICD-10-CM

## 2024-07-08 PROCEDURE — 99203 OFFICE O/P NEW LOW 30 MIN: CPT | Performed by: SURGERY

## 2024-07-08 RX ORDER — METRONIDAZOLE 500 MG/1
500 TABLET ORAL 2 TIMES DAILY
Qty: 2 TABLET | Refills: 0 | Status: SHIPPED | OUTPATIENT
Start: 2024-07-08 | End: 2024-07-09

## 2024-07-08 RX ORDER — NEOMYCIN SULFATE 500 MG/1
1000 TABLET ORAL 2 TIMES DAILY
Qty: 2 TABLET | Refills: 0 | Status: SHIPPED | OUTPATIENT
Start: 2024-07-08 | End: 2024-07-09

## 2024-07-08 RX ORDER — CHOLECALCIFEROL (VITAMIN D3) 125 MCG
TABLET ORAL
COMMUNITY

## 2024-07-09 ENCOUNTER — TELEPHONE (OUTPATIENT)
Age: 59
End: 2024-07-09

## 2024-07-09 NOTE — TELEPHONE ENCOUNTER
Attempted to contact patient to schedule surgery,no answer. Left v/m requesting call back. Waiting on response from patient.

## 2024-07-09 NOTE — LETTER
Suki Mares  [address] [id number]        ------------------------------------------------------------------------------------------------------------  7/10/2024    Dear Suki Mares,    Your surgery is scheduled for: 7/30/2024   The hospital will call the evening prior to your surgery with your expected arrival time.   Location:[address] [address]    CHECK LIST PRIOR TO INPATIENT SURGERY  It is your responsibility to obtain any/all referrals needed for your surgery if required by your insurance. Our office will contact you to discuss your insurance coverage for this procedure.    Special instructions required if you are taking any blood thinners. Please verify with the prescribing physician. Examples include Coumadin, Plavix, Xarelto, Eliquis, Pradaxa, etc.    Please check with your family physician if you are taking the following medications: Aspirin or any Aspirin containing medication, Gingko biloba, Ginseng, Feverfew, and/or Sandra’s Wort. We suggest stopping these for 3 days.     The night before and the day of your surgery, wash from your neck to groin with chlorhexidine soap.  This soap is available at most retail pharmacies under such brand names as Hibiclens, Endure or Aplicare.    Pre-admission testing Required: YES x NO     If Yes, what type:  BLOOD-WORK  x EKG   CHEST X-RAY       BOWEL PREPARATION REQUIRED: YES  x NO   If yes, start date: 7/29/2024. Please see attached bowel preparation instructions.    NOTHING TO EAT OR DRINK AFTER MIDNIGHT PRIOR TO SURGERY.    Please do not hesitate to call our office with any questions regarding your surgery.                       MIRALAX/GATORADE SURGERY PREPARATION INSTRUCTIONS    Purchase:   (1) 238g bottle of MiraLax or Glycolax - no prescription needed   4 Dulcolax Laxative Tablets - no prescription needed   64 oz. bottle of Gatorade (NOT RED), Crystal Light, or another clear liquid  of  choice.   **REMEMBER** A prescription for antibiotics has been called into your pharmacy for  prior to your surgery.    One Day Prior to Surgery  Have a normal breakfast, light lunch, and clear liquids thereafter.  It is important that you drink plenty of clear liquids throughout the day to prevent dehydration.  CLEAR LIQUIDS INCLUDE:  Water, Clear Fruit Juice (Apple, Cranberry, Grape), Black Coffee, Tea, Ginger Ale, Gatorade, Artis-Aid, Hi-C, plain Jello, Ice Pops, Clear Broth or Bouillon, Crystal Light, Lemonade, Soda (regular or diet).    No Milk Products!    At 1:00 pm, take (4) Dulcolax Tablets with 8 oz. of water.  Swallow the tablets whole with a full glass of water.  The package may direct you not to exceed (2) tablets at any time but for the purpose of this examination, you should take (4).    At 1:00 pm, take your first dose of antibiotic as prescribed.    At 1:00 pm, Mix the 238g bottle of MiraLax in 64 oz. of Gatorade and shake the solution until the MiraLax is dissolved.  Drink 8 oz. glassfuls at your own pace.  It may take 3-4 hours to drink all the solution.    At 10:00 pm, take your last dose of antibiotic as prescribed.    REMEMBER TO STAY CLOSE TO TOILET FACILITIES.    The Day of Surgery  Take nothing by mouth until after surgery.

## 2024-07-10 ENCOUNTER — TELEPHONE (OUTPATIENT)
Dept: ANESTHESIOLOGY | Facility: CLINIC | Age: 59
End: 2024-07-10

## 2024-07-10 NOTE — TELEPHONE ENCOUNTER
Patient scheduled for surgery 7/30/2024  at [facility]. Instructions and PAT's gone over with and emailed to the patient.

## 2024-07-11 ENCOUNTER — LAB REQUISITION (OUTPATIENT)
Dept: LAB | Facility: HOSPITAL | Age: 59
End: 2024-07-11
Payer: COMMERCIAL

## 2024-07-11 ENCOUNTER — APPOINTMENT (OUTPATIENT)
Dept: LAB | Facility: HOSPITAL | Age: 59
End: 2024-07-11
Payer: COMMERCIAL

## 2024-07-11 DIAGNOSIS — D49.0 CECAL NEOPLASM: ICD-10-CM

## 2024-07-11 DIAGNOSIS — D49.0 NEOPLASM OF UNSPECIFIED BEHAVIOR OF DIGESTIVE SYSTEM: ICD-10-CM

## 2024-07-11 LAB
ABO GROUP BLD: NORMAL
ANION GAP SERPL CALCULATED.3IONS-SCNC: 9 MMOL/L (ref 4–13)
APTT PPP: 28 SECONDS (ref 23–37)
BLD GP AB SCN SERPL QL: NEGATIVE
BUN SERPL-MCNC: 15 MG/DL (ref 5–25)
CALCIUM SERPL-MCNC: 9.7 MG/DL (ref 8.4–10.2)
CHLORIDE SERPL-SCNC: 105 MMOL/L (ref 96–108)
CO2 SERPL-SCNC: 26 MMOL/L (ref 21–32)
CREAT SERPL-MCNC: 0.54 MG/DL (ref 0.6–1.3)
ERYTHROCYTE [DISTWIDTH] IN BLOOD BY AUTOMATED COUNT: 12.7 % (ref 11.6–15.1)
GFR SERPL CREATININE-BSD FRML MDRD: 102 ML/MIN/1.73SQ M
GLUCOSE P FAST SERPL-MCNC: 99 MG/DL (ref 65–99)
HCT VFR BLD AUTO: 43.7 % (ref 34.8–46.1)
HGB BLD-MCNC: 14.5 G/DL (ref 11.5–15.4)
INR PPP: 0.96 (ref 0.84–1.19)
MCH RBC QN AUTO: 29.7 PG (ref 26.8–34.3)
MCHC RBC AUTO-ENTMCNC: 33.2 G/DL (ref 31.4–37.4)
MCV RBC AUTO: 89 FL (ref 82–98)
PLATELET # BLD AUTO: 340 THOUSANDS/UL (ref 149–390)
PMV BLD AUTO: 9.6 FL (ref 8.9–12.7)
POTASSIUM SERPL-SCNC: 3.8 MMOL/L (ref 3.5–5.3)
PROTHROMBIN TIME: 12.7 SECONDS (ref 11.6–14.5)
RBC # BLD AUTO: 4.89 MILLION/UL (ref 3.81–5.12)
RH BLD: POSITIVE
SODIUM SERPL-SCNC: 140 MMOL/L (ref 135–147)
SPECIMEN EXPIRATION DATE: NORMAL
WBC # BLD AUTO: 9.18 THOUSAND/UL (ref 4.31–10.16)

## 2024-07-11 PROCEDURE — 86901 BLOOD TYPING SEROLOGIC RH(D): CPT | Performed by: SURGERY

## 2024-07-11 PROCEDURE — 85730 THROMBOPLASTIN TIME PARTIAL: CPT

## 2024-07-11 PROCEDURE — 86900 BLOOD TYPING SEROLOGIC ABO: CPT | Performed by: SURGERY

## 2024-07-11 PROCEDURE — 85610 PROTHROMBIN TIME: CPT

## 2024-07-11 PROCEDURE — 85027 COMPLETE CBC AUTOMATED: CPT

## 2024-07-11 PROCEDURE — 86850 RBC ANTIBODY SCREEN: CPT | Performed by: SURGERY

## 2024-07-11 PROCEDURE — 80048 BASIC METABOLIC PNL TOTAL CA: CPT

## 2024-07-11 PROCEDURE — 36415 COLL VENOUS BLD VENIPUNCTURE: CPT

## 2024-07-12 ENCOUNTER — TELEPHONE (OUTPATIENT)
Age: 59
End: 2024-07-12

## 2024-07-12 NOTE — TELEPHONE ENCOUNTER
Patients GI provider:  Dr. vides    Number to return call: [phone]    Reason for call: Pt calling as her CT scan was denied and she is wondering what her next steps should be and if it could get pre approved. Please reach back out to the pt to discuss    Scheduled procedure/appointment date if applicable: /procedure 7/30/24    
Spontaneous, unlabored and symmetrical

## 2024-07-16 ENCOUNTER — TELEPHONE (OUTPATIENT)
Age: 59
End: 2024-07-16

## 2024-07-16 NOTE — TELEPHONE ENCOUNTER
Pt called stating pre admissions called her regarding upcoming surgery with Dr. Eugene and mentioned to pt that an EKG was ordered. Pt was not aware that this was ordered. Confirmed with pt that EKG was ordered and transferred pt to central scheduling to schedule.

## 2024-07-17 ENCOUNTER — TELEMEDICINE (OUTPATIENT)
Dept: ANESTHESIOLOGY | Facility: CLINIC | Age: 59
End: 2024-07-17
Payer: COMMERCIAL

## 2024-07-17 DIAGNOSIS — F17.210 CIGARETTE NICOTINE DEPENDENCE WITHOUT COMPLICATION: Primary | ICD-10-CM

## 2024-07-17 DIAGNOSIS — F17.200 SMOKER: ICD-10-CM

## 2024-07-17 DIAGNOSIS — D49.0 CECAL NEOPLASM: ICD-10-CM

## 2024-07-17 DIAGNOSIS — J43.2 CENTRILOBULAR EMPHYSEMA (HCC): ICD-10-CM

## 2024-07-17 PROCEDURE — 99242 OFF/OP CONSLTJ NEW/EST SF 20: CPT | Performed by: NURSE PRACTITIONER

## 2024-07-17 NOTE — PROGRESS NOTES
[facility]  CONSULT       Patient has the ability to take their vital signs at home NO  Allergies reviewed today   PMH reviewed today   Medications reviewed today     See Assessment below...    METS: 7.34Assessment/Plan:      Diagnoses and all orders for this visit:    Cigarette nicotine dependence without complication          Subjective:     Patient ID: Suki Mares is a 59 y.o. female.    Nicotine Dependence  Presents for initial visit. Preferred tobacco types include cigarettes. Preferred cigarette types include filtered. Preferred strength is regular. Preferred cigarettes are menthol. Preferred brands include Silva. Her urge triggers include company of smokers and stress. Her first smoke is from 10 AM to noon. She smokes < 1/2 a pack of cigarettes per day. The treatment provided moderate relief. Compliance with prior treatments has been good. Suki is ready to quit. Suki has tried to quit 5 or more times.     Provider prescribed wellbutrin  Review of Systems      Objective:     Physical Exam          As always we discussed having your BEST surgery, and BEST recovery.  Surgery goals reviewed today.      Breathing exercises   Patient was encouraged to begin lung exercises today.  This could be accomplished through deep breathing and cough exercises.    Eating/nutrition   Encouraged patient to increase oral protein intake prior to surgery.  This can be accomplished by consuming chicken, fish, tuna fish, cottage cheese, cheese, eggs, Greek yogurt, and protein shakes as needed.  I encouraged use of protein shakes such ENLIVE.  I also recommended making your own protein shakes with protein powder.     Sleep/Stress management  Patient was encouraged to rest their body prior to surgery.  Encouraged attempting to get 8 hours of sleep at night.  Avoid stress.  Avoid sick contacts.  Encouraged to find a relaxing hobby such as reading, meditation, listening to music.  Training  exercises  Patient was encouraged to remain active as possible.  Today bilateral lower extremity generic exercises were taught for muscle strengthening and balance.  All exercises to be done sitting down.

## 2024-07-17 NOTE — PROGRESS NOTES
"[facility] ([facility])  CONSULT: SURGERY  OPTIMIZATION   Virtual Regular Visit      Name: Suki Mares      : 1965      MRN: [id number]  Encounter Provider: OC Gage  Encounter Date: 2024   Encounter department: 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 Cigarette nicotine dependence without complication  2. Centrilobular emphysema (HCC)  3. Smoker  Copd  Last CT-SCAN from pulmonary 2024- \"good for another 2 years\"- per patient    DX Copd 2.2 smoker   Had quit in 2024- slipped up with this diagnosis   Pulmonary working with her - prescribed  Wellbutrin  Denies SOB      4. Cecal neoplasm  -     Ambulatory Referral to Surgical Optimization  Seen for SO   Started BEST   PATS reviewed stable   At risk for post-op GI - NO   At risk for post-op SSI- yes 2.2 smoking   BEST   Breathing- instructed to exercise lungs prior to surgery via deep breathing   Eat- discussed increasing protein intake prior to surgery   Sleep/stress- encouraged 8-10 sleep @ night, stress reduction, avoid sick contacts and handwashing   Train- encouraged to remain active      DVT   10 YEARS AGO   SURGERY REMOVAL       Encounter provider OC Gage    The patient was identified by name and date of birth. Suki Mares was informed that " this is a telemedicine visit and that the visit is being conducted through the Epic Embedded platform. She agrees to proceed.  My office door was closed. No one else was in the room.  She acknowledged consent and understanding of privacy and security of the video platform. The patient has agreed to participate and understands they can discontinue the visit at any time.    Patient is aware this is a billable service.     History of Present Illness     Suki Mares is a 59 y.o. female who [place] [facility] [place] pres[place]gery optimization.  She explains that through routine screening colonoscopy she was found to have an area of concern.  Further investigation led to scheduling surgery to have it removed.  Denies any signs and symptoms.  Has a family history of colon CA    I met patient over video through AudioTag.  Picture was clear.  Audio was good.  Patient was pleasant and a pleasure to care for.  She was offered a live visit in the SOC and declined.  Prefers video.    She lives at home.  She has support from her daughter.  She is independent with all ADLs.  Admits to being in well health today.  Denies any new developments in her health.  Denies chest pain.  Denies shortness of breath.    Past medical history reviewed.  She has COPD.  She feels her COPD is stable.  Denies any shortness of breath.  She quit smoking a couple months ago and then had some slip ups with her new diagnosis of the colon issues.  She smoking 2 cigarettes a day.  She is working with her pulmonologist for smoking cessation.  She was prescribed Wellbutrin.      She completed her surgery blood work.  Surgical optimization complete.  GI risk low.  SSI risk high secondary to smoking.  Provided education on quitting smoking prior to surgery  SOC anemia-no needs  Started on best protocol    She still needs to complete her EKG.  She is aware.  She will go soon.  METS is 7.  Denies chest pain denies shortness of breath.  As always we discussed having  your BEST surgery, and BEST recovery.  Surgery goals reviewed today.      Breathing exercises   Patient was encouraged to begin lung exercises today.  This could be accomplished through deep breathing and cough exercises.        Eating/nutrition   Encouraged patient to increase oral protein intake prior to surgery.     This can be accomplished by consuming chicken, fish, tuna fish, cottage cheese, cheese, eggs, Greek yogurt, and protein shakes as needed.  I encouraged use of protein shakes such ENLIVE.  I also recommended making your own protein shakes with protein powder.   Sleep/Stress management  Patient was encouraged to rest their body prior to surgery.  Encouraged attempting to get 8 hours of sleep at night.  Avoid stress.  Avoid sick contacts.  Encouraged to find a relaxing hobby such as reading, meditation, listening to music.  Training exercises  Patient was encouraged to remain active as possible.  Today bilateral lower extremity generic exercises were taught for muscle strengthening and balance.  All exercises to be done sitting down.     Review of Systems   Constitutional:  Negative for chills and fever.   HENT:  Negative for sore throat.    Respiratory:  Negative for cough, choking and shortness of breath.    Gastrointestinal:  Positive for diarrhea. Negative for abdominal distention, nausea, rectal pain and vomiting.        STRESSED - DIARRHEA    Genitourinary:  Negative for difficulty urinating.   Skin:  Negative for color change, pallor, rash and wound.   Neurological:  Negative for dizziness, facial asymmetry, light-headedness and headaches.   Psychiatric/Behavioral:  Negative for agitation, behavioral problems, confusion, decreased concentration, dysphoric mood and hallucinations.    All other systems reviewed and are negative.      Past Medical History   Past Medical History:   Diagnosis Date    Arthritis     RIGHT KNEE     COPD (chronic obstructive pulmonary disease) (HCC)     DVT (deep venous  thrombosis) (HCC)     Sept 2020    Emphysema of lung (HCC)     GERD (gastroesophageal reflux disease)     Gestational hypertension     Nerve pain     Osteoarthritis     Pneumonia     2018    Pulmonary emphysema (HCC)     Thrombophlebitis     RIGHT LEG      Past Surgical History:   Procedure Laterality Date    APPENDECTOMY  1999    CHOLECYSTECTOMY      ENDOVASCULAR LASER THERAPY (EVLT)      Left    HAND SURGERY      NC ENDOVEN ABLTJ INCMPTNT VEIN XTR LASER 1ST VEIN Left 03/12/2021    Procedure: ENDOVASCULAR LASER THERAPY (EVLT) + Stab Phlebectomy (10-20);  Surgeon: Angel Johnston MD;  Location: [facility];  Service: Vascular    NC ENDOVEN ABLTJ INCMPTNT VEIN XTR LASER 1ST VEIN Right 06/18/2021    Procedure: ENDOVASCULAR LASER THERAPY (EVLT) R GSV EVLT + Stab Phlebectomy (10-20);  Surgeon: Angel Johnston MD;  Location: [facility];  Service: Vascular    NC NDSC WRST SURG W/RLS TRANSVRS CARPL LIGM Left 05/19/2017    Procedure: ENDOSCOPIC CARPAL TUNNEL RELEASE ;  Surgeon: Frederick Goff MD;  Location: [facility] Main OR;  Service: Orthopedics    TONSILLECTOMY       Family History   Problem Relation Age of Onset    Heart attack Mother     Thyroid disease Mother     Atrial fibrillation Mother         pacemaker    Heart disease Mother     Colon cancer Father     Heart attack Father 68    Heart disease Father     Lung cancer Sister         (they share a mother only)     No Known Problems Brother     No Known Problems Daughter     No Known Problems Daughter     Colon cancer Maternal Aunt     No Known Problems Paternal Aunt     Thyroid disease Maternal Grandmother     Coronary artery disease Paternal Grandfather      Current Outpatient Medications on File Prior to Visit   Medication Sig Dispense Refill    albuterol (PROVENTIL HFA,VENTOLIN HFA) 90 mcg/act inhaler Inhale 1-2 puffs every 4 (four) hours as needed for shortness of breath or wheezing 8 g 3    bisacodyl (DULCOLAX) 5 mg EC tablet Take 1 tablet (5 mg total) by  mouth daily as needed for constipation (Patient not taking: Reported on 7/8/2024) 30 tablet 0    bisacodyl (DULCOLAX) 5 mg EC tablet Take 4 tablets (20 mg total) by mouth once for 1 dose 4 tablet 0    buPROPion (WELLBUTRIN SR) 150 mg 12 hr tablet Take 1 tablet by mouth twice daily (Patient not taking: Reported on 6/26/2024) 60 tablet 5    cholecalciferol (VITAMIN D3) 1,000 units tablet Take 2,000 Units by mouth daily      diclofenac sodium (VOLTAREN) 1 % Apply 2 g topically 4 (four) times a day (Patient not taking: Reported on 6/26/2024)      Ergocalciferol (VITAMIN D2 PO) Take by mouth      Ergocalciferol (Vitamin D2) 50 MCG (2000 UT) TABS Take by mouth      ibuprofen (MOTRIN) 600 mg tablet Take 1 tablet by mouth every 6 (six) hours as needed for mild pain or moderate pain for up to 30 days 10 tablet 0    Multiple Vitamin (MULTI-VITAMIN DAILY PO) Take 1 tablet by mouth daily      omeprazole (PriLOSEC) 40 MG capsule Take 1 capsule (40 mg total) by mouth daily before dinner 30 capsule 2    polyethylene glycol (MiraLax) 17 GM/SCOOP powder Take 238 g by mouth once for 1 dose Take 238 g my mouth. Use as directed 238 g 0    umeclidinium-vilanterol (Anoro Ellipta) 62.5-25 mcg/actuation inhaler Inhale 1 puff daily 60 each 11     No current facility-administered medications on file prior to visit.     Allergies   Allergen Reactions    Seasonal Ic  [Cholestatin]       Objective     There were no vitals taken for this visit.  Physical Exam    Visit Time  Total Visit Duration: 30 MINUTES

## 2024-07-23 NOTE — PRE-PROCEDURE INSTRUCTIONS
Pre-Surgery Instructions:   Medication Instructions    albuterol (PROVENTIL HFA,VENTOLIN HFA) 90 mcg/act inhaler Uses PRN- OK to take day of surgery    Ergocalciferol (VITAMIN D2 PO) Stop taking 7 days prior to surgery.    ibuprofen (MOTRIN) 600 mg tablet Stop taking 7 days prior to surgery.    Multiple Vitamin (MULTI-VITAMIN DAILY PO) Stop taking 7 days prior to surgery.    omeprazole (PriLOSEC) 40 MG capsule Take day of surgery.    umeclidinium-vilanterol (Anoro Ellipta) 62.5-25 mcg/actuation inhaler Take day of surgery.        Medication instructions for day surgery reviewed. Please use only a sip of water to take your instructed medications. Avoid all over the counter vitamins, supplements and NSAIDS for one week prior to surgery per anesthesia guidelines. Tylenol is ok to take as needed.     You will receive a call one business day prior to surgery with an arrival time and hospital directions. If your surgery is scheduled on a Monday, the hospital will be calling you on the Friday prior to your surgery. If you have not heard from anyone by 8pm, please call the hospital supervisor through the hospital  at [phone]. ([place] [phone] or [place] [phone]).    Do not eat or drink anything after midnight the night before your surgery, including candy, mints, lifesavers, or chewing gum. Do not drink alcohol 24hrs before your surgery. Try not to smoke at least 24hrs before your surgery.       Follow the pre surgery showering instructions as listed in the “My Surgical Experience Booklet” or otherwise provided by your surgeon's office. Do not use a blade to shave the surgical area 1 week before surgery. It is okay to use a clean electric clippers up to 24 hours before surgery. Do not apply any lotions, creams, including makeup, cologne, deodorant, or perfumes after showering on the day of your surgery. Do not use dry shampoo, hair spray, hair gel, or any type of hair products.     No contact  lenses, eye make-up, or artificial eyelashes. Remove nail polish, including gel polish, and any artificial, gel, or acrylic nails if possible. Remove all jewelry including rings and body piercing jewelry.     Wear causal clothing that is easy to take on and off. Consider your type of surgery.    Keep any valuables, jewelry, piercings at home. Please bring any specially ordered equipment (sling, braces) if indicated.    Arrange for a responsible person to drive you to and from the hospital on the day of your surgery. Please confirm the visitor policy for the day of your procedure when you receive your phone call with an arrival time.     Call the surgeon's office with any new illnesses, exposures, or additional questions prior to surgery.    Please reference your “My Surgical Experience Booklet” for additional information to prepare for your upcoming surgery.

## 2024-07-25 ENCOUNTER — TELEPHONE (OUTPATIENT)
Age: 59
End: 2024-07-25

## 2024-07-25 ENCOUNTER — OFFICE VISIT (OUTPATIENT)
Dept: LAB | Facility: HOSPITAL | Age: 59
End: 2024-07-25
Payer: COMMERCIAL

## 2024-07-25 DIAGNOSIS — D49.0 CECAL NEOPLASM: ICD-10-CM

## 2024-07-25 PROCEDURE — 93005 ELECTROCARDIOGRAM TRACING: CPT

## 2024-07-25 NOTE — TELEPHONE ENCOUNTER
Patient called the RX Refill Line. Message is being forwarded to the office.     Patient is requesting a new script for the neomycin, states incorrect qty was sent to the pharmacy, patient needs 4 tablets prior to surgery    Sent to [facility] [place]               Please contact patient at

## 2024-07-26 LAB
ATRIAL RATE: 67 BPM
P AXIS: 45 DEGREES
PR INTERVAL: 130 MS
QRS AXIS: 70 DEGREES
QRSD INTERVAL: 92 MS
QT INTERVAL: 392 MS
QTC INTERVAL: 414 MS
T WAVE AXIS: 49 DEGREES
VENTRICULAR RATE: 67 BPM

## 2024-07-26 PROCEDURE — 93010 ELECTROCARDIOGRAM REPORT: CPT | Performed by: INTERNAL MEDICINE

## 2024-07-26 NOTE — TELEPHONE ENCOUNTER
Called patient and informed her that I spoke with the pharmacist and clarified the prescription instructions. She is aware that two additional tablets were called in for her so that she will have the full quantity needed for her prescription. Asked if she had any questions about the prescription instructions. She verbally repeated the correct prescription instructions to me. Advised her to contact the office if she has any questions/ concerns.

## 2024-07-26 NOTE — TELEPHONE ENCOUNTER
Pt called refill line wondering why there is a delay in receiving the script with the correct qty. I informed her a HP message was sent yesterday and we're just waiting for the office to send in the correct qty. Pt verbalized understanding and stated that she really needs it called in today as on Monday she is doing a colonoscopy prep and will not be able to go out. Please address ASAP    Pt requesting a call back once sent at [phone]

## 2024-07-26 NOTE — TELEPHONE ENCOUNTER
The patient was prescribed neomycin 500 mg tablets with instructions to take 2 tablets at 1 pm and 2 tablets at 10 pm the day prior to her procedure. The total quantity on this prescription was 2 tablets when it should have been 4. Clarified the prescription with the pharmacist so that the patient can have the two additional tablets needed.

## 2024-07-30 ENCOUNTER — ANESTHESIA (OUTPATIENT)
Dept: PERIOP | Facility: HOSPITAL | Age: 59
DRG: 231 | End: 2024-07-30
Payer: COMMERCIAL

## 2024-07-30 ENCOUNTER — HOSPITAL ENCOUNTER (INPATIENT)
Facility: HOSPITAL | Age: 59
LOS: 3 days | Discharge: HOME | DRG: 231 | End: 2024-08-02
Attending: SURGERY | Admitting: SURGERY
Payer: COMMERCIAL

## 2024-07-30 ENCOUNTER — ANESTHESIA EVENT (OUTPATIENT)
Dept: PERIOP | Facility: HOSPITAL | Age: 59
DRG: 231 | End: 2024-07-30
Payer: COMMERCIAL

## 2024-07-30 DIAGNOSIS — D49.0 CECAL NEOPLASM: ICD-10-CM

## 2024-07-30 LAB
ABO GROUP BLD: NORMAL
GLUCOSE SERPL-MCNC: 96 MG/DL (ref 65–140)
RH BLD: POSITIVE

## 2024-07-30 PROCEDURE — 82948 REAGENT STRIP/BLOOD GLUCOSE: CPT

## 2024-07-30 PROCEDURE — 0DTF0ZZ RESECTION OF RIGHT LARGE INTESTINE, OPEN APPROACH: ICD-10-PCS | Performed by: SURGERY

## 2024-07-30 PROCEDURE — 44205 LAP COLECTOMY PART W/ILEUM: CPT | Performed by: SURGERY

## 2024-07-30 PROCEDURE — C9290 INJ, BUPIVACAINE LIPOSOME: HCPCS | Performed by: NURSE ANESTHETIST, CERTIFIED REGISTERED

## 2024-07-30 PROCEDURE — 88309 TISSUE EXAM BY PATHOLOGIST: CPT | Performed by: PATHOLOGY

## 2024-07-30 RX ORDER — PROPOFOL 10 MG/ML
INJECTION, EMULSION INTRAVENOUS AS NEEDED
Status: DISCONTINUED | OUTPATIENT
Start: 2024-07-30 | End: 2024-07-30

## 2024-07-30 RX ORDER — ALBUTEROL SULFATE 90 UG/1
1 AEROSOL, METERED RESPIRATORY (INHALATION) EVERY 4 HOURS PRN
Status: DISCONTINUED | OUTPATIENT
Start: 2024-07-30 | End: 2024-08-02 | Stop reason: HOSPADM

## 2024-07-30 RX ORDER — SODIUM CHLORIDE, SODIUM LACTATE, POTASSIUM CHLORIDE, CALCIUM CHLORIDE 600; 310; 30; 20 MG/100ML; MG/100ML; MG/100ML; MG/100ML
100 INJECTION, SOLUTION INTRAVENOUS CONTINUOUS
Status: DISCONTINUED | OUTPATIENT
Start: 2024-07-30 | End: 2024-07-31

## 2024-07-30 RX ORDER — ONDANSETRON 2 MG/ML
INJECTION INTRAMUSCULAR; INTRAVENOUS AS NEEDED
Status: DISCONTINUED | OUTPATIENT
Start: 2024-07-30 | End: 2024-07-30

## 2024-07-30 RX ORDER — FENTANYL CITRATE/PF 50 MCG/ML
25 SYRINGE (ML) INJECTION
Status: COMPLETED | OUTPATIENT
Start: 2024-07-30 | End: 2024-07-30

## 2024-07-30 RX ORDER — FENTANYL CITRATE 50 UG/ML
INJECTION, SOLUTION INTRAMUSCULAR; INTRAVENOUS AS NEEDED
Status: DISCONTINUED | OUTPATIENT
Start: 2024-07-30 | End: 2024-07-30

## 2024-07-30 RX ORDER — LIDOCAINE HYDROCHLORIDE 20 MG/ML
INJECTION, SOLUTION EPIDURAL; INFILTRATION; INTRACAUDAL; PERINEURAL AS NEEDED
Status: DISCONTINUED | OUTPATIENT
Start: 2024-07-30 | End: 2024-07-30

## 2024-07-30 RX ORDER — ONDANSETRON 2 MG/ML
4 INJECTION INTRAMUSCULAR; INTRAVENOUS ONCE AS NEEDED
Status: COMPLETED | OUTPATIENT
Start: 2024-07-30 | End: 2024-07-30

## 2024-07-30 RX ORDER — HYDROMORPHONE HCL/PF 1 MG/ML
SYRINGE (ML) INJECTION AS NEEDED
Status: DISCONTINUED | OUTPATIENT
Start: 2024-07-30 | End: 2024-07-30

## 2024-07-30 RX ORDER — EPHEDRINE SULFATE 50 MG/ML
INJECTION INTRAVENOUS AS NEEDED
Status: DISCONTINUED | OUTPATIENT
Start: 2024-07-30 | End: 2024-07-30

## 2024-07-30 RX ORDER — HYDROMORPHONE HCL IN WATER/PF 6 MG/30 ML
0.2 PATIENT CONTROLLED ANALGESIA SYRINGE INTRAVENOUS
Status: DISCONTINUED | OUTPATIENT
Start: 2024-07-30 | End: 2024-07-30 | Stop reason: HOSPADM

## 2024-07-30 RX ORDER — CEFAZOLIN SODIUM 1 G/3ML
INJECTION, POWDER, FOR SOLUTION INTRAMUSCULAR; INTRAVENOUS AS NEEDED
Status: DISCONTINUED | OUTPATIENT
Start: 2024-07-30 | End: 2024-07-30

## 2024-07-30 RX ORDER — ACETAMINOPHEN 325 MG/1
650 TABLET ORAL EVERY 4 HOURS PRN
Status: DISCONTINUED | OUTPATIENT
Start: 2024-07-30 | End: 2024-08-02 | Stop reason: HOSPADM

## 2024-07-30 RX ORDER — SODIUM CHLORIDE, SODIUM LACTATE, POTASSIUM CHLORIDE, CALCIUM CHLORIDE 600; 310; 30; 20 MG/100ML; MG/100ML; MG/100ML; MG/100ML
INJECTION, SOLUTION INTRAVENOUS CONTINUOUS PRN
Status: DISCONTINUED | OUTPATIENT
Start: 2024-07-30 | End: 2024-07-30

## 2024-07-30 RX ORDER — IPRATROPIUM BROMIDE AND ALBUTEROL SULFATE 2.5; .5 MG/3ML; MG/3ML
3 SOLUTION RESPIRATORY (INHALATION) ONCE
Status: COMPLETED | OUTPATIENT
Start: 2024-07-30 | End: 2024-07-30

## 2024-07-30 RX ORDER — ROCURONIUM BROMIDE 10 MG/ML
INJECTION, SOLUTION INTRAVENOUS AS NEEDED
Status: DISCONTINUED | OUTPATIENT
Start: 2024-07-30 | End: 2024-07-30

## 2024-07-30 RX ORDER — MIDAZOLAM HYDROCHLORIDE 2 MG/2ML
INJECTION, SOLUTION INTRAMUSCULAR; INTRAVENOUS AS NEEDED
Status: DISCONTINUED | OUTPATIENT
Start: 2024-07-30 | End: 2024-07-30

## 2024-07-30 RX ORDER — METRONIDAZOLE 500 MG/100ML
500 INJECTION, SOLUTION INTRAVENOUS ONCE
Status: COMPLETED | OUTPATIENT
Start: 2024-07-30 | End: 2024-07-30

## 2024-07-30 RX ORDER — ONDANSETRON 2 MG/ML
4 INJECTION INTRAMUSCULAR; INTRAVENOUS EVERY 6 HOURS PRN
Status: DISCONTINUED | OUTPATIENT
Start: 2024-07-30 | End: 2024-08-02 | Stop reason: HOSPADM

## 2024-07-30 RX ORDER — MAGNESIUM HYDROXIDE 1200 MG/15ML
LIQUID ORAL AS NEEDED
Status: DISCONTINUED | OUTPATIENT
Start: 2024-07-30 | End: 2024-07-30 | Stop reason: HOSPADM

## 2024-07-30 RX ORDER — SODIUM CHLORIDE 9 MG/ML
INJECTION, SOLUTION INTRAVENOUS CONTINUOUS PRN
Status: DISCONTINUED | OUTPATIENT
Start: 2024-07-30 | End: 2024-07-30

## 2024-07-30 RX ORDER — ENOXAPARIN SODIUM 100 MG/ML
40 INJECTION SUBCUTANEOUS DAILY
Status: DISCONTINUED | OUTPATIENT
Start: 2024-07-31 | End: 2024-08-02 | Stop reason: HOSPADM

## 2024-07-30 RX ORDER — DEXAMETHASONE SODIUM PHOSPHATE 10 MG/ML
INJECTION, SOLUTION INTRAMUSCULAR; INTRAVENOUS AS NEEDED
Status: DISCONTINUED | OUTPATIENT
Start: 2024-07-30 | End: 2024-07-30

## 2024-07-30 RX ORDER — CHLORHEXIDINE GLUCONATE ORAL RINSE 1.2 MG/ML
15 SOLUTION DENTAL EVERY 12 HOURS SCHEDULED
Status: DISCONTINUED | OUTPATIENT
Start: 2024-07-30 | End: 2024-07-30 | Stop reason: HOSPADM

## 2024-07-30 RX ADMIN — Medication: at 21:43

## 2024-07-30 RX ADMIN — PROPOFOL 150 MG: 10 INJECTION, EMULSION INTRAVENOUS at 14:10

## 2024-07-30 RX ADMIN — FENTANYL CITRATE 25 MCG: 50 INJECTION INTRAMUSCULAR; INTRAVENOUS at 17:36

## 2024-07-30 RX ADMIN — PROPOFOL 40 MCG/KG/MIN: 10 INJECTION, EMULSION INTRAVENOUS at 14:15

## 2024-07-30 RX ADMIN — DEXAMETHASONE SODIUM PHOSPHATE 10 MG: 10 INJECTION, SOLUTION INTRAMUSCULAR; INTRAVENOUS at 14:15

## 2024-07-30 RX ADMIN — ONDANSETRON 4 MG: 2 INJECTION INTRAMUSCULAR; INTRAVENOUS at 17:58

## 2024-07-30 RX ADMIN — HYDROMORPHONE HYDROCHLORIDE 0.5 MG: 1 INJECTION, SOLUTION INTRAMUSCULAR; INTRAVENOUS; SUBCUTANEOUS at 15:12

## 2024-07-30 RX ADMIN — ONDANSETRON 4 MG: 2 INJECTION INTRAMUSCULAR; INTRAVENOUS at 16:39

## 2024-07-30 RX ADMIN — Medication 40 MG: at 20:24

## 2024-07-30 RX ADMIN — ROCURONIUM BROMIDE 50 MG: 10 INJECTION INTRAVENOUS at 14:10

## 2024-07-30 RX ADMIN — MIDAZOLAM 2 MG: 1 INJECTION INTRAMUSCULAR; INTRAVENOUS at 14:00

## 2024-07-30 RX ADMIN — LIDOCAINE HYDROCHLORIDE 100 MG: 20 INJECTION, SOLUTION EPIDURAL; INFILTRATION; INTRACAUDAL at 14:10

## 2024-07-30 RX ADMIN — CHLORHEXIDINE GLUCONATE 15 ML: 1.2 RINSE ORAL at 13:27

## 2024-07-30 RX ADMIN — SODIUM CHLORIDE, SODIUM LACTATE, POTASSIUM CHLORIDE, AND CALCIUM CHLORIDE 100 ML/HR: .6; .31; .03; .02 INJECTION, SOLUTION INTRAVENOUS at 20:45

## 2024-07-30 RX ADMIN — SODIUM CHLORIDE, SODIUM LACTATE, POTASSIUM CHLORIDE, AND CALCIUM CHLORIDE: .6; .31; .03; .02 INJECTION, SOLUTION INTRAVENOUS at 13:08

## 2024-07-30 RX ADMIN — FENTANYL CITRATE 50 MCG: 50 INJECTION INTRAMUSCULAR; INTRAVENOUS at 14:34

## 2024-07-30 RX ADMIN — SUGAMMADEX 200 MG: 100 INJECTION, SOLUTION INTRAVENOUS at 17:04

## 2024-07-30 RX ADMIN — IPRATROPIUM BROMIDE AND ALBUTEROL SULFATE 3 ML: 2.5; .5 SOLUTION RESPIRATORY (INHALATION) at 12:37

## 2024-07-30 RX ADMIN — DEXMEDETOMIDINE 0.2 MCG/KG/HR: 100 INJECTION, SOLUTION INTRAVENOUS at 14:15

## 2024-07-30 RX ADMIN — METRONIDAZOLE: 500 INJECTION, SOLUTION INTRAVENOUS at 14:00

## 2024-07-30 RX ADMIN — FENTANYL CITRATE 25 MCG: 50 INJECTION INTRAMUSCULAR; INTRAVENOUS at 17:40

## 2024-07-30 RX ADMIN — EPHEDRINE SULFATE 5 MG: 50 INJECTION INTRAVENOUS at 15:41

## 2024-07-30 RX ADMIN — HYDROMORPHONE HYDROCHLORIDE 0.2 MG: 0.2 INJECTION, SOLUTION INTRAMUSCULAR; INTRAVENOUS; SUBCUTANEOUS at 17:48

## 2024-07-30 RX ADMIN — ACETAMINOPHEN 650 MG: 325 TABLET, FILM COATED ORAL at 22:24

## 2024-07-30 RX ADMIN — FENTANYL CITRATE 25 MCG: 50 INJECTION INTRAMUSCULAR; INTRAVENOUS at 17:45

## 2024-07-30 RX ADMIN — SODIUM CHLORIDE, SODIUM LACTATE, POTASSIUM CHLORIDE, AND CALCIUM CHLORIDE: .6; .31; .03; .02 INJECTION, SOLUTION INTRAVENOUS at 15:15

## 2024-07-30 RX ADMIN — SODIUM CHLORIDE: 9 INJECTION, SOLUTION INTRAVENOUS at 13:08

## 2024-07-30 RX ADMIN — FENTANYL CITRATE 25 MCG: 50 INJECTION INTRAMUSCULAR; INTRAVENOUS at 17:33

## 2024-07-30 RX ADMIN — UMECLIDINIUM BROMIDE AND VILANTEROL TRIFENATATE 1 PUFF: 62.5; 25 POWDER RESPIRATORY (INHALATION) at 20:24

## 2024-07-30 RX ADMIN — CEFAZOLIN 2000 MG: 1 INJECTION, POWDER, FOR SOLUTION INTRAMUSCULAR; INTRAVENOUS at 14:15

## 2024-07-30 RX ADMIN — FENTANYL CITRATE 50 MCG: 50 INJECTION INTRAMUSCULAR; INTRAVENOUS at 14:10

## 2024-07-30 RX ADMIN — ROCURONIUM BROMIDE 20 MG: 10 INJECTION INTRAVENOUS at 15:10

## 2024-07-30 RX ADMIN — ROCURONIUM BROMIDE 10 MG: 10 INJECTION INTRAVENOUS at 16:34

## 2024-07-30 NOTE — ANESTHESIA POSTPROCEDURE EVALUATION
Post-Op Assessment Note    CV Status:  Stable  Pain Score: 0    Pain management: adequate       Mental Status:  Alert and awake   Hydration Status:  Euvolemic   PONV Controlled:  Controlled   Airway Patency:  Patent  Two or more mitigation strategies used for obstructive sleep apnea   Post Op Vitals Reviewed: Yes    No anethesia notable event occurred.    Staff: CRNA, Anesthesiologist               BP   170/80   Temp   97   Pulse  72   Resp   16   SpO2   100

## 2024-07-30 NOTE — OP NOTE
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 Tissue Colon TISSUE EXAM Marti Eugene MD 2024 1633        Estimated Blood Loss:   Minimal    Drains:  Urethral Catheter Non-latex 16 Fr. 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  Informed consent was reviewed and confirmed.  She was then taken to the operating room, where general anesthesia was induced, and an endotracheal tube was placed by the anesthesiology team.  She was placed in the supine position with both arms tucked, and all pressure points were padded.  A Mazariegos catheter was placed under sterile conditions.  Her abdomen was then prepped and draped in the usual sterile fashion.  A brief timeout was called.  All in the room were in agreement.    The abdomen was insufflated to a pressure of 15 mmHg through Veress needle in the left upper quadrant.  Then, using a 5 mm Optiview trocar, the abdomen was entered through a supraumbilical incision under direct visualization.  No iatrogenic injuries were identified.  2 additional 5 mm trocars were placed in the left lower quadrant and subxiphoid regions, and a 12 mm trocar was placed in the suprapubic area.       The patient was then placed in Trendelenburg position with right side up.  The cecum and terminal ileum were identified.  The cecum was retracted cephalad and anteriorly in order to expose the ileocolic pedicle.  The peritoneum just inferior to the pedicle was sharply incised using Enseal device.  An avascular plane was developed using primarily blunt dissection.  The base of the ileocolic pedicle was identified, with the duodenum medial and posterior to our dissection plane.  High ligation was performed using a single fire of the [place] 60 mm stapler with a white load.  Hemostasis was ensured.  Further medial to lateral dissection was carried out until the lateral abdominal sidewall was encountered.  The lateral peritoneal attachments were then sharply incised, completely medializing the ascending colon to the level of the hepatic flexure.  The hepatic flexure was then fully mobilized by  the omentum from the colon using Enseal device.      Once adequate mobilization was achieved, the colon was then exteriorized through open wound protector placed in the supraumbilical incision that was extended inferiorly.  The right colic vessels were identified near the duodenum.  They were clamped and tied near the base.  The right branch of the middle colic artery was identified and preserved.  The proximal transection point was chosen 5 healthy terminal ileum, and the distal transection point was chosen at the level of the right branch of the middle colic vessels.  Mesentery was cleared off these areas.  The bowel was aligned in a side-to-side antiperistaltic fashion with 3-0 Vicryl stay sutures.  Enterotomies were made, and limbs of a 100 mm DEANGELO stapler was placed in the small bowel and the colon.  A common channel was created by firing the 100 mm stapler.  The bowel was then transected using a TL 60.  The specimen was removed from the field.  The transverse staple line was then oversewn using a running 3-0  Vicryl.  The mesenteric defect was closed using a running 2-0 Vicryl.    The bowel was returned to the abdomen.  Dirty instruments were removed from the field, and gloves were changed.  The abdomen was then reinsufflated to a pressure of 15 mmHg.  The 12 mm trocar site was closed using an interrupted 0 Vicryl suture at the level of the fascia.  Remaining trocars were removed under direct visualization.  The midline abdominal wound was closed using 2x running #1 PDS with a centrally secured knot.  A total of 20 cc of Exparel was used to infiltrate the fascia of the incisions under direct visualization.  Skin incisions were closed with 4-0 Monocryl.  The abdomen was cleansed and patted dry.  Incisions were dressed with skin glue.    All sponge, needle, instrument counts were correct x 2.     I was present for the entire procedure.    Patient Disposition:  PACU     Colon Resection  Operation performed with curative intent Yes   Tumor Location (select all that apply) Cecum   Extent of colon and vascular resection (select all that apply) Right hemicolectomy - ileocolic, right colic (if present)          SIGNATURE: Marti Eugene MD  DATE: July 30, 2024  TIME: 5:00 PM

## 2024-07-30 NOTE — ANESTHESIA PREPROCEDURE EVALUATION
Procedure:  RESECTION COLON RIGHT LAPAROSCOPIC HAND-ASSISTED (Abdomen)    COPD - duoneb administered    Relevant Problems   GI/HEPATIC   (+) Gastroesophageal reflux disease without esophagitis      MUSCULOSKELETAL   (+) Patellofemoral arthritis of left knee      PULMONARY   (+) Centrilobular emphysema (HCC)   (+) Smoker        Physical Exam    Airway    Mallampati score: II  TM Distance: >3 FB  Neck ROM: full     Dental   No notable dental hx     Cardiovascular  Rhythm: regular, Rate: normal, Cardiovascular exam normal    Pulmonary  Pulmonary exam normal Decreased breath sounds, Wheezes    Other Findings  post-pubertal.      Anesthesia Plan  ASA Score- 3     Anesthesia Type- general with ASA Monitors.         Additional Monitors:     Airway Plan: ETT.    Comment: Risks of general anesthesia discussed including likely possibility of PONV and sore throat, as well as the rare possibilities of aspiration, dental/oropharyngeal/ocular injuries, or grave/life threatening anesthetic and surgical emergencies..       Plan Factors-Exercise tolerance (METS): >4 METS.    Chart reviewed.    Patient summary reviewed.      Patient instructed to abstain from smoking on day of procedure. Patient did not smoke on day of surgery.            Induction- intravenous.    Postoperative Plan- Plan for postoperative opioid use. Planned trial extubation        Informed Consent- Anesthetic plan and risks discussed with patient.  I personally reviewed this patient with the CRNA. Discussed and agreed on the Anesthesia Plan with the CRNA..

## 2024-07-30 NOTE — PROGRESS NOTES
"Progress Note  Suki Mares 59 y.o. female MRN: [id number]  Unit/Bed#: S -01 Encounter: [id number]    Assessment:  59F with cecal neoplasm s/p 7/30 lap hand-assist R riddhi.    Plan:  - adv to regular diet, d/c IVF  - d/c Mazariegos  - d/c dPCA  - DVT ppx    Subjective/Objective   NAOE. Pain controlled, not using PCA. Tolerating CLD, reports mild nausea but thinks it's from her headache/backache, no emesis. No BM, no flatus. Voiding. Will walk today.    Physical Exam:  General: NAD  CV: nl rate  Lungs: nl wob. No resp distress.  ABD: Soft, ND, RLQ tenderness, CDI, mild bruising around incisions  Extrem: No CCE  Mazariegos    Patient Vitals for the past 24 hrs:   BP Temp Temp src Pulse Resp SpO2 Height Weight   07/31/24 0715 (!) 171/80 98.1 °F (36.7 °C) Oral 77 16 (!) 85 % -- --   07/31/24 0610 166/81 -- -- 83 16 (!) 88 % -- --   07/31/24 0205 165/79 98.1 °F (36.7 °C) Oral 75 20 90 % -- --   07/30/24 2231 (!) 178/86 -- -- 73 -- 92 % -- --   07/30/24 2221 165/86 97.7 °F (36.5 °C) Oral 80 20 (!) 89 % -- --   07/30/24 2143 144/85 -- Oral 76 18 91 % -- --   07/30/24 1900 164/75 -- -- 68 20 98 % -- --   07/30/24 1845 168/82 -- -- 68 18 98 % -- --   07/30/24 1830 169/79 -- -- 70 18 97 % -- --   07/30/24 1815 156/76 -- -- 68 20 97 % -- --   07/30/24 1800 148/71 -- -- 74 20 98 % -- --   07/30/24 1749 -- -- -- 78 (!) 25 96 % -- --   07/30/24 1748 -- -- -- 82 20 (!) 89 % -- --   07/30/24 1745 (!) 177/80 -- -- 80 20 97 % -- --   07/30/24 1740 -- -- -- 78 20 91 % -- --   07/30/24 1736 -- -- -- 74 20 94 % -- --   07/30/24 1733 161/78 -- -- 78 22 94 % -- --   07/30/24 1730 161/78 -- -- 74 20 95 % -- --   07/30/24 1724 (!) 172/81 97.9 °F (36.6 °C) -- 74 (!) 23 100 % -- --   07/30/24 1119 155/76 97.7 °F (36.5 °C) Temporal 80 19 95 % 5' 8\" (1.727 m) 68.9 kg (152 lb)       I/O         07/28 0701 07/29 0700 07/29 0701 07/30 0700 07/30 0701 07/31 0700    I.V. (mL/kg)   1700 (24.7)    IV Piggyback   100    Total Intake(mL/kg)   1800 " (26.1)    Urine (mL/kg/hr)   250    Blood   20    Total Output   270    Net   +1530           Unmeasured Urine Occurrence   1 x            Recent Labs     07/31/24  0605   WBC 14.31*   HGB 12.7      SODIUM 139   K 3.5      CO2 27   BUN 7   CREATININE 0.53*   GLUC 100   CALCIUM 8.5   EGFR 103     Lab Results   Component Value Date    LEUKOCYTESUR Negative 11/23/2017

## 2024-07-30 NOTE — QUICK NOTE
"Post-Op Check    Assessment:  59F with cecal neoplasm s/p 7/30 lap hand-assist R riddhi.     Subjective:  Pt's pain is controlled. Denies nausea, chest pain, SOB.    Objective:  VSS on RA.  /75   Pulse 68   Temp 97.9 °F (36.6 °C)   Resp 20   Ht 5' 8\" (1.727 m)   Wt 68.9 kg (152 lb)   SpO2 98%   BMI 23.11 kg/m²     General: NAD  CV: nl rate  Lungs: nl wob. No resp distress.  ABD: Soft, ND, mild cindy-incisional tenderness, CDI  Extrem: No CCE  "

## 2024-07-31 LAB
ANION GAP SERPL CALCULATED.3IONS-SCNC: 6 MMOL/L (ref 4–13)
BASOPHILS # BLD AUTO: 0.04 THOUSANDS/ÂΜL (ref 0–0.1)
BASOPHILS NFR BLD AUTO: 0 % (ref 0–1)
BUN SERPL-MCNC: 7 MG/DL (ref 5–25)
CALCIUM SERPL-MCNC: 8.5 MG/DL (ref 8.4–10.2)
CHLORIDE SERPL-SCNC: 106 MMOL/L (ref 96–108)
CO2 SERPL-SCNC: 27 MMOL/L (ref 21–32)
CREAT SERPL-MCNC: 0.53 MG/DL (ref 0.6–1.3)
EOSINOPHIL # BLD AUTO: 0.02 THOUSAND/ÂΜL (ref 0–0.61)
EOSINOPHIL NFR BLD AUTO: 0 % (ref 0–6)
ERYTHROCYTE [DISTWIDTH] IN BLOOD BY AUTOMATED COUNT: 13 % (ref 11.6–15.1)
GFR SERPL CREATININE-BSD FRML MDRD: 103 ML/MIN/1.73SQ M
GLUCOSE SERPL-MCNC: 100 MG/DL (ref 65–140)
HCT VFR BLD AUTO: 37.4 % (ref 34.8–46.1)
HGB BLD-MCNC: 12.7 G/DL (ref 11.5–15.4)
IMM GRANULOCYTES # BLD AUTO: 0.08 THOUSAND/UL (ref 0–0.2)
IMM GRANULOCYTES NFR BLD AUTO: 1 % (ref 0–2)
LYMPHOCYTES # BLD AUTO: 2.2 THOUSANDS/ÂΜL (ref 0.6–4.47)
LYMPHOCYTES NFR BLD AUTO: 15 % (ref 14–44)
MCH RBC QN AUTO: 30.3 PG (ref 26.8–34.3)
MCHC RBC AUTO-ENTMCNC: 34 G/DL (ref 31.4–37.4)
MCV RBC AUTO: 89 FL (ref 82–98)
MONOCYTES # BLD AUTO: 1.21 THOUSAND/ÂΜL (ref 0.17–1.22)
MONOCYTES NFR BLD AUTO: 9 % (ref 4–12)
NEUTROPHILS # BLD AUTO: 10.76 THOUSANDS/ÂΜL (ref 1.85–7.62)
NEUTS SEG NFR BLD AUTO: 75 % (ref 43–75)
NRBC BLD AUTO-RTO: 0 /100 WBCS
PLATELET # BLD AUTO: 290 THOUSANDS/UL (ref 149–390)
PMV BLD AUTO: 10.1 FL (ref 8.9–12.7)
POTASSIUM SERPL-SCNC: 3.5 MMOL/L (ref 3.5–5.3)
RBC # BLD AUTO: 4.19 MILLION/UL (ref 3.81–5.12)
SODIUM SERPL-SCNC: 139 MMOL/L (ref 135–147)
WBC # BLD AUTO: 14.31 THOUSAND/UL (ref 4.31–10.16)

## 2024-07-31 PROCEDURE — 97166 OT EVAL MOD COMPLEX 45 MIN: CPT

## 2024-07-31 PROCEDURE — 85025 COMPLETE CBC W/AUTO DIFF WBC: CPT

## 2024-07-31 PROCEDURE — 99024 POSTOP FOLLOW-UP VISIT: CPT | Performed by: SURGERY

## 2024-07-31 PROCEDURE — 80048 BASIC METABOLIC PNL TOTAL CA: CPT

## 2024-07-31 RX ORDER — CALCIUM CARBONATE 500 MG/1
500 TABLET, CHEWABLE ORAL 3 TIMES DAILY PRN
Status: DISCONTINUED | OUTPATIENT
Start: 2024-07-31 | End: 2024-08-02 | Stop reason: HOSPADM

## 2024-07-31 RX ORDER — HYDRALAZINE HYDROCHLORIDE 20 MG/ML
5 INJECTION INTRAMUSCULAR; INTRAVENOUS EVERY 4 HOURS PRN
Status: DISCONTINUED | OUTPATIENT
Start: 2024-07-31 | End: 2024-08-02 | Stop reason: HOSPADM

## 2024-07-31 RX ORDER — SIMETHICONE 80 MG
80 TABLET,CHEWABLE ORAL EVERY 6 HOURS PRN
Status: DISCONTINUED | OUTPATIENT
Start: 2024-07-31 | End: 2024-08-02 | Stop reason: HOSPADM

## 2024-07-31 RX ORDER — OXYCODONE HYDROCHLORIDE 10 MG/1
10 TABLET ORAL EVERY 4 HOURS PRN
Status: DISCONTINUED | OUTPATIENT
Start: 2024-07-31 | End: 2024-08-02 | Stop reason: HOSPADM

## 2024-07-31 RX ORDER — OXYCODONE HYDROCHLORIDE 5 MG/1
5 TABLET ORAL EVERY 4 HOURS PRN
Status: DISCONTINUED | OUTPATIENT
Start: 2024-07-31 | End: 2024-08-02 | Stop reason: HOSPADM

## 2024-07-31 RX ORDER — LABETALOL HYDROCHLORIDE 5 MG/ML
10 INJECTION, SOLUTION INTRAVENOUS EVERY 4 HOURS PRN
Status: DISCONTINUED | OUTPATIENT
Start: 2024-07-31 | End: 2024-08-02 | Stop reason: HOSPADM

## 2024-07-31 RX ORDER — HYDROMORPHONE HCL/PF 1 MG/ML
0.5 SYRINGE (ML) INJECTION
Status: DISCONTINUED | OUTPATIENT
Start: 2024-07-31 | End: 2024-08-02 | Stop reason: HOSPADM

## 2024-07-31 RX ADMIN — LABETALOL HYDROCHLORIDE 10 MG: 5 INJECTION, SOLUTION INTRAVENOUS at 12:08

## 2024-07-31 RX ADMIN — SODIUM CHLORIDE, SODIUM LACTATE, POTASSIUM CHLORIDE, AND CALCIUM CHLORIDE 100 ML/HR: .6; .31; .03; .02 INJECTION, SOLUTION INTRAVENOUS at 07:47

## 2024-07-31 RX ADMIN — OXYCODONE HYDROCHLORIDE 10 MG: 10 TABLET ORAL at 20:40

## 2024-07-31 RX ADMIN — OXYCODONE HYDROCHLORIDE 10 MG: 10 TABLET ORAL at 13:23

## 2024-07-31 RX ADMIN — Medication 40 MG: at 16:03

## 2024-07-31 RX ADMIN — CALCIUM CARBONATE (ANTACID) CHEW TAB 500 MG 500 MG: 500 CHEW TAB at 12:07

## 2024-07-31 RX ADMIN — ACETAMINOPHEN 650 MG: 325 TABLET, FILM COATED ORAL at 07:51

## 2024-07-31 RX ADMIN — UMECLIDINIUM BROMIDE AND VILANTEROL TRIFENATATE 1 PUFF: 62.5; 25 POWDER RESPIRATORY (INHALATION) at 19:06

## 2024-07-31 RX ADMIN — SIMETHICONE 80 MG: 80 TABLET, CHEWABLE ORAL at 12:07

## 2024-07-31 RX ADMIN — ENOXAPARIN SODIUM 40 MG: 40 INJECTION SUBCUTANEOUS at 08:51

## 2024-07-31 NOTE — PHYSICAL THERAPY NOTE
Physical Therapy Screen    Patient Name: Suki Mares  Today's Date: 7/31/2024  Problem List  Active Problems:  There are no active Hospital Problems.       07/31/24 1358   PT Last Visit   PT Visit Date 07/31/24   Note Type   Note type Screen   Additional Comments PT consult received and chart reviewed, PT screen completed. Pt is a 59 yr old female admitted 7/30/24 s/p laparoscopic R hemicolectomy by Dr. Eugene. Therapist has seen pt independently ambulating throughout hallways without issue. Therapist made contact with pt whom denies functional concerns about d/c home once medically cleared. Spoke with MARCIAL Murry. Will screen from PT caseload at this time. Please reconsult if pt's functional status significantly changes.       Madison Diggs, PT, DPT   Available via [employer]  NPI # [id number]  [place] License - [id number]  7/31/2024

## 2024-07-31 NOTE — OCCUPATIONAL THERAPY NOTE
Occupational Therapy Evaluation     Patient Name: Suki Mares  Today's Date: 7/31/2024  Problem List  Active Problems:  There are no active Hospital Problems.    Past Medical History  Past Medical History:   Diagnosis Date    Arthritis     RIGHT KNEE     COPD (chronic obstructive pulmonary disease) (HCC)     DVT (deep venous thrombosis) (HCC)     Sept 2020    Emphysema of lung (HCC)     GERD (gastroesophageal reflux disease)     Gestational hypertension     Nerve pain     Osteoarthritis     Pneumonia     2018    Pulmonary emphysema (HCC)     Thrombophlebitis     RIGHT LEG      Past Surgical History  Past Surgical History:   Procedure Laterality Date    APPENDECTOMY  1999    CHOLECYSTECTOMY      ENDOVASCULAR LASER THERAPY (EVLT)      Left    HAND SURGERY      VT ENDOVEN ABLTJ INCMPTNT VEIN XTR LASER 1ST VEIN Left 03/12/2021    Procedure: ENDOVASCULAR LASER THERAPY (EVLT) + Stab Phlebectomy (10-20);  Surgeon: Angel Johnston MD;  [facility];  Service: Vascular    VT ENDOVEN ABLTJ INCMPTNT VEIN XTR LASER 1ST VEIN Right 06/18/2021    Procedure: ENDOVASCULAR LASER THERAPY (EVLT) R GSV EVLT + Stab Phlebectomy (10-20);  Surgeon: Angel Johnston MD;  [facility];  Service: Vascular    VT LAPAROSCOPY COLECTOMY PARTIAL W/ANASTOMOSIS N/A 7/30/2024    Procedure: LAPAROSCOPIC RIGHT HEMICOLECTOMY;  Surgeon: Marti Eugene MD;  [facility];  Service: Colorectal    VT NDSC WRST SURG W/RLS TRANSVRS CARPL LIGM Left 05/19/2017    Procedure: ENDOSCOPIC CARPAL TUNNEL RELEASE ;  Surgeon: Frederick Goff MD;  [facility];  Service: Orthopedics    TONSILLECTOMY             07/31/24 1305   OT Last Visit   OT Visit Date 07/31/24   Note Type   Note type Evaluation   Pain Assessment   Pain Assessment Tool 0-10   Pain Score 7   Pain Location/Orientation Location: Abdomen   Pain Onset/Description Onset: Ongoing   Effect of Pain on Daily Activities limited mobility, functional  reach, ADLs   Multiple Pain Sites No   Restrictions/Precautions   Weight Bearing Precautions Per Order No   Home Living   Type of Home House   Home Layout Two level;Bed/bath upstairs   Bathroom Shower/Tub Tub/shower unit   Bathroom Toilet Standard   Bathroom Equipment Shower chair   Home Equipment   (none)   Prior Function   Level of Ray Independent with ADLs;Independent with functional mobility;Independent with IADLS   Lives With Other (Comment)  (grand son)   Receives Help From Family   IADLs Independent with driving;Independent with medication management;Independent with meal prep   Falls in the last 6 months 0   Vocational Retired   Lifestyle   Autonomy Patient lives in two-story house 3 steps to enter with her 17-year-old grandson but was home schooled on the computer.  Patient reports bedroom and bathroom on second floor no bathroom available on first level.  Patient was independent with I/ADLs independent ambulation, +  prior to admission   Reciprocal Relationships Supportive grandson and family   Service to Others Assists with grandson's care   General   Family/Caregiver Present No   ADL   Where Assessed Chair   Eating Assistance 7  Independent   Grooming Assistance 7  Independent   Grooming Deficit Wash/dry face;Wash/dry hands   UB Bathing Assistance 6  Modified Independent   LB Bathing Assistance 6  Modified Independent   UB Dressing Assistance 7  Independent   Toileting Assistance  7  Independent   Bed Mobility   Supine to Sit Unable to assess   Additional Comments Pt OOB at start of session   Transfers   Sit to Stand 6  Modified independent   Additional items Increased time required   Stand to Sit 6  Modified independent   Additional items Increased time required   Toilet transfer 7  Independent   Additional items Increased time required   Functional Mobility   Functional Mobility 6  Modified independent   Additional Comments increased time, holding onto wall at times due to weakness    Activity Tolerance   Activity Tolerance Patient limited by fatigue;Patient limited by pain   Medical Staff Made Aware Jacinda PT   RUE Assessment   RUE Assessment WFL   LUE Assessment   LUE Assessment WFL   Cognition   Overall Cognitive Status WFL   Arousal/Participation Alert;Cooperative   Attention Within functional limits   Orientation Level Oriented X4   Memory Within functional limits   Following Commands Follows all commands and directions without difficulty   Assessment   Assessment Pt is a 59 y.o. 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 POD#1  LAPAROSCOPIC RIGHT HEMICOLECTOMY.  Personal and env factors supporting pt at time of IE include (I) PLOF, supportive family, and attitude towards recovery. Pt was living with her 16 y/o grandson in a 2 STH, 3 GAUTAM home, bedroom and bathroom on 2nd floor at Indep level for all I/ADLs, Indep ambulation + no DME.  Pt presents today with back/abdominal pain and generalized weakness. Despite pt's current functional limitations and medical complications pt is functioning close to  baseline. She was able to complete sit<>stand and functional ambulation at [place]fo/sup level w/o AD. UB ADLs completed at Arn[place]fo level and Arn[place]fo/Oral for LB ADLs. Pt was supplied ed on use of LHAE and RW for increased Indep and safety.  No further acute OT needs identified at this time. Recommend continued active ADL participation and mobilization with hospital staff while in the hospital to increase pt’s endurance and strength upon D/C. From OT standpoint, recommend D/C home with family support when medically cleared. D/C pt from OT caseload at this time.  No further OTC needs after d/c.   Goals   Patient Goals to go home   Plan   OT Treatment Day 0   OT Frequency Eval only   Discharge Recommendation   Rehab Resource Intensity Level, OT No post-acute rehabilitation needs   AM-PAC Daily Activity Inpatient   Lower Body Dressing 3    Bathing 3   Toileting 4   Upper Body Dressing 4   Grooming 4   Eating 4   Daily Activity Raw Score 22   Daily Activity Standardized Score (Calc for Raw Score >=11) 47.1   AM-PAC Applied Cognition Inpatient   Following a Speech/Presentation 4   Understanding Ordinary Conversation 4   Taking Medications 4   Remembering Where Things Are Placed or Put Away 4   Remembering List of 4-5 Errands 4   Taking Care of Complicated Tasks 4   Applied Cognition Raw Score 24   Applied Cognition Standardized Score 62.21   End of Consult   Education Provided Yes   Patient Position at End of Consult Bedside chair;All needs within reach   Nurse Communication Nurse aware of consult

## 2024-07-31 NOTE — UTILIZATION REVIEW
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   Operative Findings: Right hemicolectomy with side-to-side functional end-to-end ileal colotomy  High ligation of ileocolic pedicle and right colic pedicle    POD#1 Progress Note: 7/31   Abdomen soft, ND, RLQ tenderness, CDI, mild bruising around incisions . Pt not using Dilaudid PCA. Tolerating CL diet with mild nausea that pt thins is from her Headache / back ache , No bm, no flatus  yet . O2 sats 85-88 % on RA at rest . Plan to advance diet to reg, lo fiber, lo residue . D/C IVF , D/C Dilaudid PCA . D/C garcia cath. Encourage ambulation . DVT ppx. Spirometry .              Admission Orders: Date/Time/Statement:   Admission Orders (From admission, onward)       Ordered        07/30/24 1737  Inpatient Admission  Once                          Orders Placed This Encounter   Procedures    Inpatient Admission     Standing Status:   Standing     Number of Occurrences:   1     Order Specific Question:   Level of Care     Answer:   Med Surg [16]     Order Specific Question:   Estimated length of stay     Answer:   More than 2 Midnights     Order Specific Question:   Certification     Answer:   I certify that inpatient services are medically necessary for this patient for a duration of greater than two midnights. See H&P and MD Progress Notes for additional information about the patient's course of treatment.       Vital Signs (last 3 days)       Date/Time Temp Pulse Resp BP MAP (mmHg) SpO2 Calculated FIO2 (%) - Nasal Cannula O2 Flow Rate (L/min) Nasal Cannula O2 Flow Rate (L/min) O2 Device Patient Position - Orthostatic VS [place] Coma Scale Score Pain    07/31/24 0800 -- -- -- -- -- 88 % -- -- -- None (Room air) -- 15 7    07/31/24 0751 -- -- -- -- -- -- -- -- -- -- -- -- 7    07/31/24 07:15:53 98.1 °F (36.7  °C) 77 16 171/80 110 85 % -- -- -- None (Room air) Lying -- --    07/31/24 0700 -- -- -- -- -- -- -- -- -- -- -- -- 2    07/31/24 06:10:11 -- 83 16 166/81 109 88 % -- -- -- -- -- -- No Pain    07/31/24 02:05:58 98.1 °F (36.7 °C) 75 20 165/79 108 90 % -- -- -- None (Room air) Lying -- No Pain    07/31/24 0120 -- -- -- -- -- -- -- -- -- None (Room air) -- -- No Pain    07/31/24 0020 -- -- -- -- -- -- -- -- -- None (Room air) -- -- No Pain    07/30/24 2320 -- -- -- -- -- -- -- -- -- None (Room air) -- -- No Pain    07/30/24 22:31:30 -- 73 -- 178/86 117 92 % -- -- -- -- -- -- --    07/30/24 22:21:58 97.7 °F (36.5 °C) 80 20 165/86 112 89 % -- -- -- None (Room air) Lying -- No Pain    07/30/24 2143 -- 76 18 144/85 -- 91 % -- -- -- None (Room air) -- -- No Pain    07/30/24 1937 -- -- -- -- -- -- 28 -- 2 L/min Nasal cannula -- 15 2    07/30/24 1900 -- 68 20 164/75 111 98 % 28 -- 2 L/min Nasal cannula -- -- --    07/30/24 1845 -- 68 18 168/82 -- 98 % 28 -- 2 L/min Nasal cannula -- -- No Pain    07/30/24 1830 -- 70 18 169/79 114 97 % 28 -- 2 L/min Nasal cannula -- -- --    07/30/24 1815 -- 68 20 156/76 109 97 % 28 -- 2 L/min Nasal cannula -- -- --    07/30/24 1800 -- 74 20 148/71 101 98 % 28 -- 2 L/min Nasal cannula -- -- --    07/30/24 1749 -- 78 25 -- -- 96 % 28 -- 2 L/min Nasal cannula -- -- --    07/30/24 1748 -- 82 20 -- -- 89 % -- -- -- None (Room air) -- -- 5 07/30/24 1745 -- 80 20 177/80 115 97 % -- -- -- None (Room air) -- -- 5 07/30/24 1740 -- 78 20 -- -- 91 % -- -- -- None (Room air) -- -- 5 07/30/24 1736 -- 74 20 -- -- 94 % -- -- -- None (Room air) -- -- 7 07/30/24 1733 -- 78 22 161/78 105 94 % -- -- -- None (Room air) -- -- 7 07/30/24 1730 -- 74 20 161/78 105 95 % -- -- -- None (Room air) -- -- --    07/30/24 1724 97.9 °F (36.6 °C) 74 23 172/81 116 100 % -- 6 L/min -- Simple mask -- -- --    07/30/24 1119 97.7 °F (36.5 °C) 80 19 155/76 -- 95 % -- -- -- None (Room air) -- -- No Pain          Weight  (last 2 days)       Date/Time Weight    07/30/24 1119 68.9 (152)            Pertinent Labs/Diagnostic Test Results:   Radiology:  No orders to display     Cardiology:  No orders to display     GI:  No orders to display           Results from last 7 days   Lab Units 07/31/24  0605   WBC Thousand/uL 14.31*   HEMOGLOBIN g/dL 12.7   HEMATOCRIT % 37.4   PLATELETS Thousands/uL 290   TOTAL NEUT ABS Thousands/µL 10.76*         Results from last 7 days   Lab Units 07/31/24  0605   SODIUM mmol/L 139   POTASSIUM mmol/L 3.5   CHLORIDE mmol/L 106   CO2 mmol/L 27   ANION GAP mmol/L 6   BUN mg/dL 7   CREATININE mg/dL 0.53*   EGFR ml/min/1.73sq m 103   CALCIUM mg/dL 8.5         Results from last 7 days   Lab Units 07/30/24  1318   POC GLUCOSE mg/dl 96     Results from last 7 days   Lab Units 07/31/24  0605   GLUCOSE RANDOM mg/dL 100             Diet: CL diet--->reg diet, no carbonation , lo fiber, lo residue 7/31   Mobility: Up as inna  DVT Prophylaxis: SCD, Lovenox,ambulation     Medications/Pain Control:   Scheduled Medications:  enoxaparin, 40 mg, Subcutaneous, Daily  omeprazole (PRILOSEC) suspension 2 mg/mL, 40 mg, Oral, Before Dinner  umeclidinium-vilanterol, 1 puff, Inhalation, Daily    metroNIDAZOLE (FLAGYL) IVPB (premix) 500 mg 100 mL  Dose: 500 mg  Freq: Once Route: IV  Last Dose: Stopped (07/30/24 1432)  Start: 07/30/24 1115 End: 07/30/24 1432  ipratropium-albuterol (DUO-NEB) 0.5-2.5 mg/3 mL inhalation solution 3 mL  Dose: 3 mL  Freq: Once Route: NEBULIZATION  Start: 07/30/24 1230 End: 07/30/24 1237      Continuous IV Infusions:     lactated ringers infusion  Rate: 100 mL/hr Dose: 100 mL/hr  Freq: Continuous Route: IV  Last Dose: Stopped (07/31/24 0958)  Start: 07/30/24 1745 End: 07/31/24 0851   HYDROmorphone (DILAUDID) 1 mg/mL 50 mL PCA  Continuous Rate: 0 mg/hr  PCA Dose: 0.2 mg  PCA Lock-out: 10 Minutes  One Hour Limit: 0.2 mg  Freq: Continuous Route: IV  Last Dose: Stopped (07/31/24 0957)  Start: 07/30/24 1745 End:  07/31/24 0851        PRN Meds:  acetaminophen, 650 mg, Oral, Q4H PRN x1 7/30, x1 7/31   albuterol, 1 puff, Inhalation, Q4H PRN  HYDROmorphone, 0.5 mg, Intravenous, Q3H PRN  lactated ringers, 1,000 mL, Intravenous, Once PRN   And  lactated ringers, 1,000 mL, Intravenous, Once PRN  ondansetron, 4 mg, Intravenous, Q6H PRN x1 7/30   oxyCODONE, 10 mg, Oral, Q4H PRN  oxyCODONE, 5 mg, Oral, Q4H PRN  sodium chloride, 1,000 mL, Intravenous, Once PRN   And  sodium chloride, 1,000 mL, Intravenous, Once PRN    HYDROmorphone HCl (DILAUDID) injection 0.2 mg  Dose: 0.2 mg  Freq: Every 5 minutes PRN Route: IV  PRN Reason: Pain - PACU  PRN Comment: Breakthrough Pain. Second Line  Start: 07/30/24 1724 End: 07/30/24 1930 x1 7/30   fentaNYL (SUBLIMAZE) injection 25 mcg  Dose: 25 mcg  Freq: Every 3 minutes PRN Route: IV  PRN Reason: Pain - PACU  PRN Comment: Breakthrough - first line  Start: 07/30/24 1724 End: 07/30/24 1745 x4 7/30       Network Utilization Review Department  ATTENTION: Please call with any questions or concerns to [phone] and carefully listen to the prompts so that you are directed to the right person. All voicemails are confidential.   For Discharge needs, contact Care Management DC Support Team at [phone] opt. 2  Send all requests for admission clinical reviews, approved or denied determinations and any other requests to dedicated fax number below belonging to the campus where the patient is receiving treatment. List of dedicated fax numbers for the Facilities:  FACILITY NAME UR FAX NUMBER   ADMISSION DENIALS (Administrative/Medical Necessity) [phone]   DISCHARGE SUPPORT TEAM (NETWORK) [phone]   PARENT CHILD HEALTH (Maternity/NICU/Pediatrics) [phone]   [facility] [phone]   [facility] [phone]   Critical access hospital [phone]   [facility] [phone]   [facility]  [place] [phone]   [facility] [phone]   [facility] [phone]   [facility] [phone]   [facility] [phone]   [facility] [phone]   [facility] [phone]   [facility] [phone]

## 2024-08-01 ENCOUNTER — APPOINTMENT (INPATIENT)
Dept: RADIOLOGY | Facility: HOSPITAL | Age: 59
DRG: 231 | End: 2024-08-01
Payer: COMMERCIAL

## 2024-08-01 LAB
ANION GAP SERPL CALCULATED.3IONS-SCNC: 5 MMOL/L (ref 4–13)
BASOPHILS # BLD AUTO: 0.03 THOUSANDS/ÂΜL (ref 0–0.1)
BASOPHILS NFR BLD AUTO: 0 % (ref 0–1)
BUN SERPL-MCNC: 7 MG/DL (ref 5–25)
CALCIUM SERPL-MCNC: 9.1 MG/DL (ref 8.4–10.2)
CARDIAC TROPONIN I PNL SERPL HS: 6 NG/L
CHLORIDE SERPL-SCNC: 107 MMOL/L (ref 96–108)
CO2 SERPL-SCNC: 30 MMOL/L (ref 21–32)
CREAT SERPL-MCNC: 0.51 MG/DL (ref 0.6–1.3)
D DIMER PPP FEU-MCNC: 2.26 UG/ML FEU
EOSINOPHIL # BLD AUTO: 0.08 THOUSAND/ÂΜL (ref 0–0.61)
EOSINOPHIL NFR BLD AUTO: 1 % (ref 0–6)
ERYTHROCYTE [DISTWIDTH] IN BLOOD BY AUTOMATED COUNT: 13.1 % (ref 11.6–15.1)
GFR SERPL CREATININE-BSD FRML MDRD: 104 ML/MIN/1.73SQ M
GLUCOSE SERPL-MCNC: 100 MG/DL (ref 65–140)
HCT VFR BLD AUTO: 40.3 % (ref 34.8–46.1)
HGB BLD-MCNC: 13.3 G/DL (ref 11.5–15.4)
IMM GRANULOCYTES # BLD AUTO: 0.03 THOUSAND/UL (ref 0–0.2)
IMM GRANULOCYTES NFR BLD AUTO: 0 % (ref 0–2)
LYMPHOCYTES # BLD AUTO: 1.91 THOUSANDS/ÂΜL (ref 0.6–4.47)
LYMPHOCYTES NFR BLD AUTO: 18 % (ref 14–44)
MCH RBC QN AUTO: 29.6 PG (ref 26.8–34.3)
MCHC RBC AUTO-ENTMCNC: 33 G/DL (ref 31.4–37.4)
MCV RBC AUTO: 90 FL (ref 82–98)
MONOCYTES # BLD AUTO: 0.81 THOUSAND/ÂΜL (ref 0.17–1.22)
MONOCYTES NFR BLD AUTO: 8 % (ref 4–12)
NEUTROPHILS # BLD AUTO: 7.71 THOUSANDS/ÂΜL (ref 1.85–7.62)
NEUTS SEG NFR BLD AUTO: 73 % (ref 43–75)
NRBC BLD AUTO-RTO: 0 /100 WBCS
PLATELET # BLD AUTO: 260 THOUSANDS/UL (ref 149–390)
PMV BLD AUTO: 10 FL (ref 8.9–12.7)
POTASSIUM SERPL-SCNC: 4.1 MMOL/L (ref 3.5–5.3)
RBC # BLD AUTO: 4.49 MILLION/UL (ref 3.81–5.12)
SODIUM SERPL-SCNC: 142 MMOL/L (ref 135–147)
WBC # BLD AUTO: 10.57 THOUSAND/UL (ref 4.31–10.16)

## 2024-08-01 PROCEDURE — 71045 X-RAY EXAM CHEST 1 VIEW: CPT

## 2024-08-01 PROCEDURE — 85025 COMPLETE CBC W/AUTO DIFF WBC: CPT | Performed by: PHYSICIAN ASSISTANT

## 2024-08-01 PROCEDURE — 85379 FIBRIN DEGRADATION QUANT: CPT

## 2024-08-01 PROCEDURE — 99024 POSTOP FOLLOW-UP VISIT: CPT | Performed by: SURGERY

## 2024-08-01 PROCEDURE — 84484 ASSAY OF TROPONIN QUANT: CPT

## 2024-08-01 PROCEDURE — 93005 ELECTROCARDIOGRAM TRACING: CPT

## 2024-08-01 PROCEDURE — 80048 BASIC METABOLIC PNL TOTAL CA: CPT | Performed by: PHYSICIAN ASSISTANT

## 2024-08-01 RX ADMIN — Medication 40 MG: at 17:42

## 2024-08-01 RX ADMIN — SIMETHICONE 80 MG: 80 TABLET, CHEWABLE ORAL at 22:50

## 2024-08-01 RX ADMIN — OXYCODONE HYDROCHLORIDE 10 MG: 10 TABLET ORAL at 12:21

## 2024-08-01 RX ADMIN — OXYCODONE HYDROCHLORIDE 10 MG: 10 TABLET ORAL at 16:52

## 2024-08-01 RX ADMIN — UMECLIDINIUM BROMIDE AND VILANTEROL TRIFENATATE 1 PUFF: 62.5; 25 POWDER RESPIRATORY (INHALATION) at 19:20

## 2024-08-01 RX ADMIN — OXYCODONE HYDROCHLORIDE 10 MG: 10 TABLET ORAL at 08:07

## 2024-08-01 RX ADMIN — SIMETHICONE 80 MG: 80 TABLET, CHEWABLE ORAL at 08:11

## 2024-08-01 RX ADMIN — SIMETHICONE 80 MG: 80 TABLET, CHEWABLE ORAL at 16:52

## 2024-08-01 RX ADMIN — ENOXAPARIN SODIUM 40 MG: 40 INJECTION SUBCUTANEOUS at 08:07

## 2024-08-01 RX ADMIN — OXYCODONE HYDROCHLORIDE 10 MG: 10 TABLET ORAL at 21:34

## 2024-08-01 NOTE — PROGRESS NOTES
"Colorectal Surgery  Progress Note   Suki Mares 59 y.o. female MRN: [id number]  Unit/Bed#: S -01 Encounter: [id number]    Assessment:  59F with cecal neoplasm s/p  lap hand-assist R riddhi.     Plan:  -Lo res diet as tolerated  -Lovenox  -Pain/ nausea control PRN  -OOB/ ambulation  -Incentive Spirometry  -DC pending return of bowel function  -Please message the General surgery resident role with any concerns      Subjective/Objective     Subjective:   Patient seen and examined at bedside, in no acute distress. No acute events overnight.  Patient had some left-sided inferior rib pain which has resolved.  Patient is tolerating diet.  No bowel movements or flatus.  Ambulating.  Voiding appropriately.  No nausea vomiting fevers chills chest pain or shortness of breath.    Objective:   Vitals:Blood pressure 159/88, pulse 75, temperature 98.4 °F (36.9 °C), temperature source Oral, resp. rate 18, height 5' 8\" (1.727 m), weight 68.9 kg (152 lb), SpO2 90%.  Temp (24hrs), Av.1 °F (36.7 °C), Min:97.5 °F (36.4 °C), Max:98.4 °F (36.9 °C)      I/O          07 07 0701   0700  07 0700    P.O.   240    I.V. (mL/kg) 1700 (24.7)      IV Piggyback 100      Total Intake(mL/kg) 1800 (26.1)  240 (3.5)    Urine (mL/kg/hr) 700 1650 (1) 600 (0.9)    Blood 20      Total Output 720 1650 600    Net +1080 -1650 -360           Unmeasured Urine Occurrence 1 x 1 x             Invasive Devices       Peripheral Intravenous Line  Duration             Peripheral IV 24 Left Hand 2 days    Peripheral IV 24 Left Wrist 2 days                    Physical Exam:  GEN: NAD  HEENT: MMM  CV: well perfused RR  Lung: Normal effort  Ab: Soft mild tenderness diffusely, incisions clean dry intact  Extrem: No CCE   Neuro: A+Ox3     Lab Results   Component Value Date    WBC 10.57 (H) 2024    HGB 13.3 2024    HCT 40.3 2024    MCV 90 2024     2024      Lab Results "   Component Value Date     09/28/2015    SODIUM 142 08/01/2024    K 4.1 08/01/2024     08/01/2024    CO2 30 08/01/2024    ANIONGAP 11 09/28/2015    AGAP 5 08/01/2024    BUN 7 08/01/2024    CREATININE 0.51 (L) 08/01/2024    GLUC 100 08/01/2024    GLUF 99 07/11/2024    CALCIUM 9.1 08/01/2024    AST 15 12/09/2023    ALT 14 12/09/2023    ALKPHOS 66 12/09/2023    PROT 6.7 09/28/2015    TP 7.1 12/09/2023    BILITOT 0.26 09/28/2015    TBILI 0.41 12/09/2023    EGFR 104 08/01/2024       VTE Prophylaxis: Enoxaparin (Lovenox)        Eugenia Ward MD  8/2/2024  4:56 AM

## 2024-08-01 NOTE — PROGRESS NOTES
Progress Note  Suki Mares 59 y.o. female MRN: [id number]  Unit/Bed#: S -01 Encounter: [id number]    Assessment:  59F with cecal neoplasm s/p 7/30 lap hand-assist R riddhi.    Plan:  - Tolerating reg lo fiber diet  - analgesia as needed  -will d/w attending potential for suppository   -  awaiting return of bowel function   - Lovenox   - OOB and IS use  - DVT ppx  - Dispo planning    Subjective/Objective   NAOEN. Minimal pain. Tolerating diet, No BM, no flatus. Voiding.    Physical Exam:  General: NAD  CV: nl rate  Lungs: nl wob. No resp distress.  ABD: Soft, ND, RLQ tenderness, CDI  Extrem: No CCE  Mazariegos    Patient Vitals for the past 24 hrs:   BP Temp Temp src Pulse Resp SpO2   07/31/24 2327 159/92 -- -- 77 18 91 %   07/31/24 2241 (!) 175/85 98.4 °F (36.9 °C) Oral 81 18 (!) 88 %   07/31/24 1525 152/84 98.4 °F (36.9 °C) Oral 73 18 (!) 88 %   07/31/24 1130 (!) 190/90 -- -- -- -- --   07/31/24 1100 (!) 186/91 97.9 °F (36.6 °C) Oral 67 18 (!) 89 %   07/31/24 0800 -- -- -- -- -- (!) 88 %   07/31/24 0715 (!) 171/80 98.1 °F (36.7 °C) Oral 77 16 (!) 85 %   07/31/24 0610 166/81 -- -- 83 16 (!) 88 %   07/31/24 0205 165/79 98.1 °F (36.7 °C) Oral 75 20 90 %       I/O         07/28 0701 07/29 0700 07/29 0701 07/30 0700 07/30 0701 07/31 0700    I.V. (mL/kg)   1700 (24.7)    IV Piggyback   100    Total Intake(mL/kg)   1800 (26.1)    Urine (mL/kg/hr)   250    Blood   20    Total Output   270    Net   +1530           Unmeasured Urine Occurrence   1 x            Recent Labs     07/31/24  0605   WBC 14.31*   HGB 12.7      SODIUM 139   K 3.5      CO2 27   BUN 7   CREATININE 0.53*   GLUC 100   CALCIUM 8.5   EGFR 103     Lab Results   Component Value Date    LEUKOCYTESUR Negative 11/23/2017

## 2024-08-02 VITALS
DIASTOLIC BLOOD PRESSURE: 84 MMHG | WEIGHT: 152 LBS | HEIGHT: 68 IN | BODY MASS INDEX: 23.04 KG/M2 | OXYGEN SATURATION: 93 % | TEMPERATURE: 97.9 F | SYSTOLIC BLOOD PRESSURE: 140 MMHG | HEART RATE: 78 BPM | RESPIRATION RATE: 16 BRPM

## 2024-08-02 LAB
2HR DELTA HS TROPONIN: 0 NG/L
CARDIAC TROPONIN I PNL SERPL HS: 6 NG/L

## 2024-08-02 PROCEDURE — 99024 POSTOP FOLLOW-UP VISIT: CPT | Performed by: SURGERY

## 2024-08-02 PROCEDURE — NC001 PR NO CHARGE: Performed by: SURGERY

## 2024-08-02 PROCEDURE — 88309 TISSUE EXAM BY PATHOLOGIST: CPT | Performed by: PATHOLOGY

## 2024-08-02 PROCEDURE — 84484 ASSAY OF TROPONIN QUANT: CPT

## 2024-08-02 RX ORDER — OXYCODONE HYDROCHLORIDE 5 MG/1
5 TABLET ORAL EVERY 4 HOURS PRN
Qty: 12 TABLET | Refills: 0 | Status: SHIPPED | OUTPATIENT
Start: 2024-08-02 | End: 2024-08-12

## 2024-08-02 RX ORDER — ACETAMINOPHEN 325 MG/1
650 TABLET ORAL EVERY 4 HOURS PRN
COMMUNITY
Start: 2024-08-02

## 2024-08-02 RX ADMIN — SIMETHICONE 80 MG: 80 TABLET, CHEWABLE ORAL at 08:33

## 2024-08-02 RX ADMIN — ENOXAPARIN SODIUM 40 MG: 40 INJECTION SUBCUTANEOUS at 08:33

## 2024-08-02 RX ADMIN — OXYCODONE HYDROCHLORIDE 10 MG: 10 TABLET ORAL at 08:32

## 2024-08-02 RX ADMIN — OXYCODONE HYDROCHLORIDE 10 MG: 10 TABLET ORAL at 14:36

## 2024-08-02 RX ADMIN — OXYCODONE HYDROCHLORIDE 10 MG: 10 TABLET ORAL at 04:53

## 2024-08-02 NOTE — DISCHARGE INSTR - AVS FIRST PAGE
Activity:    May lift 10 lb as many times as desired the 1st week,       20 lb in 2 weeks,       30 lb in 3 weeks.                Walking is encouraged  Normal daily activities including climbing steps are okay  Do not engage in strenuous activity ( sit-ups or crutches) or contact sports for 4-6 weeks post-operatively    Return to Work:   Okay to return to work when you feel well if you desire.        Diet:   Low Fiber diet    Wound Care:  Your wound is closed with dissolvable stitches and glue.  It is okay to shower. Wash incision gently with soap and water and pat dry. Do not soak incisions in bath water or swim for two weeks. Do not apply any creams or ointments.    Pain Medication:   Please take as directed if needed. May use Advil or Motrin in addition.  Recall, the pain medicine and anesthesia is associated with constipation.    No driving while taking narcotic pain medications.      Other:  It is normal to developed a “healing ridge” / firm incision after surgery.  This is your body making scar tissue.  It is a good sign  Constipation is very common after general anesthesia.  Please use milk of magnesia as needed in order to help prevent constipation.  It is normal to get bruising after surgery.  If you have questions after discharge please call the office.    If you have increased pain, fever >101.5, increased drainage, redness or a bad smell at your surgery site, please call us immediately or come directly to the Emergency Room

## 2024-08-04 LAB
ATRIAL RATE: 77 BPM
ATRIAL RATE: 78 BPM
P AXIS: 68 DEGREES
P AXIS: 69 DEGREES
PR INTERVAL: 130 MS
PR INTERVAL: 130 MS
QRS AXIS: 56 DEGREES
QRS AXIS: 58 DEGREES
QRSD INTERVAL: 92 MS
QRSD INTERVAL: 92 MS
QT INTERVAL: 376 MS
QT INTERVAL: 380 MS
QTC INTERVAL: 428 MS
QTC INTERVAL: 430 MS
T WAVE AXIS: 40 DEGREES
T WAVE AXIS: 46 DEGREES
VENTRICULAR RATE: 77 BPM
VENTRICULAR RATE: 78 BPM

## 2024-08-04 PROCEDURE — 93010 ELECTROCARDIOGRAM REPORT: CPT | Performed by: INTERNAL MEDICINE

## 2024-08-05 ENCOUNTER — TRANSITIONAL CARE MANAGEMENT (OUTPATIENT)
Dept: FAMILY MEDICINE CLINIC | Facility: CLINIC | Age: 59
End: 2024-08-05

## 2024-08-05 NOTE — UTILIZATION REVIEW
NOTIFICATION OF ADMISSION DISCHARGE   This is a Notification of Discharge from [facility]. Please be advised that this patient has been discharge from our facility. Below you will find the admission and discharge date and time including the patient’s disposition.   UTILIZATION REVIEW CONTACT:  Francisca Rodas  Utilization   Network Utilization Review Department  Phone: [phone] x carefully listen to the prompts. All voicemails are confidential.  Email: [email or web address]     ADMISSION INFORMATION  PRESENTATION DATE: 7/30/2024 10:42 AM  OBERVATION ADMISSION DATE: N/A  INPATIENT ADMISSION DATE: 7/30/24  5:37 PM   DISCHARGE DATE: 8/2/2024  5:35 PM   DISPOSITION:Home/Self Care    Network Utilization Review Department  ATTENTION: Please call with any questions or concerns to [phone] and carefully listen to the prompts so that you are directed to the right person. All voicemails are confidential.   For Discharge needs, contact Care Management DC Support Team at [phone] opt. 2  Send all requests for admission clinical reviews, approved or denied determinations and any other requests to dedicated fax number below belonging to the campus where the patient is receiving treatment. List of dedicated fax numbers for the Facilities:  FACILITY NAME UR FAX NUMBER   ADMISSION DENIALS (Administrative/Medical Necessity) [phone]   DISCHARGE SUPPORT TEAM ([facility]) [phone]   PARENT CHILD HEALTH (Maternity/NICU/Pediatrics) [phone]   [facility] [phone]   General acute hospital [phone]   [facility] [phone]   [facility] [phone]   [facility] [phone]   [facility] [phone]   [facility] [phone]   [facility] [phone]    [facility] [phone]   [facility] [phone]   [facility] [phone]   [facility] [phone]

## 2024-08-06 NOTE — DISCHARGE SUMMARY
Discharge Summary - Suki Mares 59 y.o. 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 female who presented 7/30/2024 with cecal neoplasm. The patient was take to the operating room on 07/30/2024. Post-operatively, patient was kept inpatient for pain control, dvt prophylaxis, and post-operative management.  Patient's diet was advanced POD1.  Patient was monitored for return of bowel function. On 8/2, patient began passing flatus and had a bowel movement. Patient's pain was controlled, diet was tolerated, and stable for discharge home.    Patient was discharged on POD3. On the day of discharge, the patient was voiding spontaneously, ambulating at baseline, and pain was well controlled. The patient was sent home with medication for pain.  She understood all instructions for discharge.  She was also given the names and numbers of the providers as well as instructions for follow up appointments.     Condition at Discharge: good     Discharge instructions/Information to patient and family:   See after visit summary for information provided to patient and family.      Provisions for Follow-Up Care:  See after visit summary for information related to follow-up care and any pertinent home health orders.      Disposition: Home    Planned Readmission: No    Discharge Statement   I spent 18 minutes discharging the patient. This time was spent on the day of discharge. I had direct contact with the patient on the day of discharge.     Discharge Medications:  See after visit summary for reconciled discharge medications provided to patient and  family.

## 2024-08-20 NOTE — PROGRESS NOTES
Ambulatory Visit  Name: Suki Mares      : 1965      MRN: [id number]  Encounter Provider: Marti Eugene MD  Encounter Date: 2024   Encounter department: [facility]    Assessment & Plan   1. Cecal neoplasm  2. Hypoxia  -     Ambulatory Referral to Sleep Medicine; Future  Reviewed pathology of tubulovillous adenoma with patient and daughter today  She has recovered well from surgery and has no further restrictions  Discussed the importance of fiber supplementation and adequate hydration  She request referral to sleep medicine for evaluation of obstructive sleep apnea, as she had previously been advised to go following hospitalization  Will follow-up with our office in 6 months  She does have follow-up with gastroenterology to discuss appropriate colonoscopy schedule    History of Present Illness     Suki Mares is a 59 y.o. female who is here today for a post operative evaluation.     The patient notes pulling pain from the surgery site starting a week after surgery. . Denies rectal bleeding and notes a good appetite. Having 4 bowel movements daily, which have started to gain shape to them.    The patient is status post laparoscopic right hemicolectomy on 2024.     Operative Findings:  Right hemicolectomy with side-to-side functional end-to-end ileal colotomy  High ligation of ileocolic pedicle and right colic pedicle    Surgical pathology:  A. Terminal ileum, right colon (right hemicolectomy):  - Tubulovillous adenoma (2.0cm) with clear margins    - No high grade dysplasia   - Twenty-seven (27) lymph nodes negative for carcinoma (0/27)         Review of Systems   Constitutional:  Negative for chills and fever.   HENT:  Negative for ear pain and sore throat.    Eyes:  Negative for pain and visual disturbance.   Respiratory:  Negative for cough and shortness of breath.    Cardiovascular:  Negative for chest pain and palpitations.    Gastrointestinal:  Negative for abdominal pain and vomiting.   Genitourinary:  Negative for dysuria and hematuria.   Musculoskeletal:  Negative for arthralgias and back pain.   Skin:  Negative for color change and rash.   Neurological:  Negative for seizures and syncope.   All other systems reviewed and are negative.    Past Medical History   Past Medical History:   Diagnosis Date    Arthritis     RIGHT KNEE     COPD (chronic obstructive pulmonary disease) (HCC)     DVT (deep venous thrombosis) (HCC)     Sept 2020    Emphysema of lung (HCC)     GERD (gastroesophageal reflux disease)     Gestational hypertension     Nerve pain     Osteoarthritis     Pneumonia     2018    Pulmonary emphysema (HCC)     Thrombophlebitis     RIGHT LEG      Past Surgical History:   Procedure Laterality Date    APPENDECTOMY  1999    CHOLECYSTECTOMY      ENDOVASCULAR LASER THERAPY (EVLT)      Left    HAND SURGERY      OR ENDOVEN ABLTJ INCMPTNT VEIN XTR LASER 1ST VEIN Left 03/12/2021    Procedure: ENDOVASCULAR LASER THERAPY (EVLT) + Stab Phlebectomy (10-20);  Surgeon: Angel Johnston MD;  Location: [facility];  Service: Vascular    OR ENDOVEN ABLTJ INCMPTNT VEIN XTR LASER 1ST VEIN Right 06/18/2021    Procedure: ENDOVASCULAR LASER THERAPY (EVLT) R GSV EVLT + Stab Phlebectomy (10-20);  Surgeon: Anegl Johnston MD;  Location: [facility];  Service: Vascular    OR LAPAROSCOPY COLECTOMY PARTIAL W/ANASTOMOSIS N/A 7/30/2024    Procedure: LAPAROSCOPIC RIGHT HEMICOLECTOMY;  Surgeon: Marti Eugene MD;  Location: [facility];  Service: Colorectal    OR NDSC WRST SURG W/RLS TRANSVRS CARPL LIGM Left 05/19/2017    Procedure: ENDOSCOPIC CARPAL TUNNEL RELEASE ;  Surgeon: Frederick Goff MD;  Location: [facility];  Service: Orthopedics    TONSILLECTOMY       Family History   Problem Relation Age of Onset    Heart attack Mother     Thyroid disease Mother     Atrial fibrillation Mother         pacemaker    Heart disease Mother     Colon  cancer Father     Heart attack Father 68    Heart disease Father     Lung cancer Sister         (they share a mother only)     No Known Problems Brother     No Known Problems Daughter     No Known Problems Daughter     Colon cancer Maternal Aunt     No Known Problems Paternal Aunt     Thyroid disease Maternal Grandmother     Coronary artery disease Paternal Grandfather      Current Outpatient Medications on File Prior to Visit   Medication Sig Dispense Refill    acetaminophen (TYLENOL) 325 mg tablet Take 2 tablets (650 mg total) by mouth every 4 (four) hours as needed for mild pain      albuterol (PROVENTIL HFA,VENTOLIN HFA) 90 mcg/act inhaler Inhale 1-2 puffs every 4 (four) hours as needed for shortness of breath or wheezing 8 g 3    Ergocalciferol (VITAMIN D2 PO) Take by mouth      ibuprofen (MOTRIN) 600 mg tablet Take 1 tablet by mouth every 6 (six) hours as needed for mild pain or moderate pain for up to 30 days 10 tablet 0    Multiple Vitamin (MULTI-VITAMIN DAILY PO) Take 1 tablet by mouth daily      omeprazole (PriLOSEC) 40 MG capsule Take 1 capsule (40 mg total) by mouth daily before dinner 30 capsule 2    umeclidinium-vilanterol (Anoro Ellipta) 62.5-25 mcg/actuation inhaler Inhale 1 puff daily 60 each 11    bisacodyl (DULCOLAX) 5 mg EC tablet Take 1 tablet (5 mg total) by mouth daily as needed for constipation (Patient not taking: Reported on 7/8/2024) 30 tablet 0    bisacodyl (DULCOLAX) 5 mg EC tablet Take 4 tablets (20 mg total) by mouth once for 1 dose (Patient not taking: Reported on 8/23/2024) 4 tablet 0    buPROPion (WELLBUTRIN SR) 150 mg 12 hr tablet Take 1 tablet by mouth twice daily (Patient not taking: Reported on 6/26/2024) 60 tablet 5    cholecalciferol (VITAMIN D3) 1,000 units tablet Take 2,000 Units by mouth daily (Patient not taking: Reported on 8/23/2024)      diclofenac sodium (VOLTAREN) 1 % Apply 2 g topically 4 (four) times a day (Patient not taking: Reported on 6/26/2024)       polyethylene glycol (MiraLax) 17 GM/SCOOP powder Take 238 g by mouth once for 1 dose Take 238 g my mouth. Use as directed (Patient not taking: Reported on 8/23/2024) 238 g 0     No current facility-administered medications on file prior to visit.   No Known Allergies   Objective     There were no vitals taken for this visit.    Physical Exam  Vitals and nursing note reviewed.   Constitutional:       General: She is not in acute distress.     Appearance: She is well-developed.   HENT:      Head: Normocephalic and atraumatic.   Eyes:      Conjunctiva/sclera: Conjunctivae normal.   Cardiovascular:      Rate and Rhythm: Normal rate and regular rhythm.      Heart sounds: No murmur heard.  Pulmonary:      Effort: Pulmonary effort is normal. No respiratory distress.      Breath sounds: Normal breath sounds.   Abdominal:      Palpations: Abdomen is soft.      Tenderness: There is no abdominal tenderness.      Comments: Incisions healing appropriately with no palpable hernia or bulges   Musculoskeletal:         General: No swelling.      Cervical back: Neck supple.   Skin:     General: Skin is warm and dry.      Capillary Refill: Capillary refill takes less than 2 seconds.   Neurological:      Mental Status: She is alert.   Psychiatric:         Mood and Affect: Mood normal.       Administrative Statements   I have spent a total time of 14 minutes in caring for this patient on the day of the visit/encounter including Diagnostic results, Prognosis, Risks and benefits of tx options, Instructions for management, Patient and family education, Importance of tx compliance, Risk factor reductions, Impressions, Counseling / Coordination of care, Documenting in the medical record, Reviewing / ordering tests, medicine, procedures  , Obtaining or reviewing history  , and Communicating with other healthcare professionals .

## 2024-08-23 ENCOUNTER — OFFICE VISIT (OUTPATIENT)
Age: 59
End: 2024-08-23

## 2024-08-23 VITALS — HEIGHT: 68 IN | BODY MASS INDEX: 21.98 KG/M2 | WEIGHT: 145 LBS

## 2024-08-23 DIAGNOSIS — D49.0 CECAL NEOPLASM: Primary | ICD-10-CM

## 2024-08-23 DIAGNOSIS — R09.02 HYPOXIA: ICD-10-CM

## 2024-08-23 PROCEDURE — 99024 POSTOP FOLLOW-UP VISIT: CPT | Performed by: SURGERY

## 2024-08-23 NOTE — PATIENT INSTRUCTIONS
High fiber diet/increased hydration, 20-30 grams fiber per day, increased fruits/vegetables    Start fiber supplementation with benefiber. You may put this in your coffee/tea/juice in the morning. Start with 2 tsp once per day. If you experience bloating or gassiness, you may start with 1 tsp once per day. You may increase fiber supplementation to UP TO 2 tsp three times per day in order to achieve 1 formed bowel movement once per day.    Aim to drink at least 64 oz of water per day      High Fiber Diet   AMBULATORY CARE:   A high-fiber diet  includes foods that have a high amount of fiber. Fiber is the part of fruits, vegetables, and grains that is not broken down by your body. Fiber keeps your bowel movements regular. Fiber can also help lower your cholesterol level, control blood sugar in people with diabetes, and relieve constipation. Fiber can also help you control your weight because it helps you feel full faster. Most adults should eat 25 to 35 grams of fiber each day. Talk to your dietitian or healthcare provider about the amount of fiber you need.  Good sources of fiber:       Foods with at least 4 grams of fiber per serving:      ? to ½ cup of high-fiber cereal (check the nutrition label on the box)    ½ cup of blackberries or raspberries    4 dried prunes    1 cooked artichoke    ½ cup of cooked legumes, such as lentils, or red, kidney, and cisneros beans    Foods with 1 to 3 grams of fiber per servin slice of whole-wheat, pumpernickel, or rye bread    ½ cup of cooked brown rice    4 whole-wheat crackers    1 cup of oatmeal    ½ cup of cereal with 1 to 3 grams of fiber per serving (check the nutrition label on the box)    1 small piece of fruit, such as an apple, banana, pear, kiwi, or orange    3 dates    ½ cup of canned apricots, fruit cocktail, peaches, or pears    ½ cup of raw or cooked vegetables, such as carrots, cauliflower, cabbage, spinach, squash, or corn  Ways that you can increase fiber  in your diet:   Choose brown or wild rice instead of white rice.     Use whole wheat flour in recipes instead of white or all-purpose flour.     Add beans and peas to casseroles or soups.     Choose fresh fruit and vegetables with peels or skins on instead of juices.    Other diet guidelines to follow:   Add fiber to your diet slowly.  You may have abdominal discomfort, bloating, and gas if you add fiber to your diet too quickly.     Drink plenty of liquids as you add fiber to your diet.  You may have nausea or develop constipation if you do not drink enough water. Ask how much liquid to drink each day and which liquids are best for you.    © Copyright [employer] 2023 Information is for End User's use only and may not be sold, redistributed or otherwise used for commercial purposes.  The above information is an  only. It is not intended as medical advice for individual conditions or treatments. Talk to your doctor, nurse or pharmacist before following any medical regimen to see if it is safe and effective for you.

## 2024-09-12 ENCOUNTER — VBI (OUTPATIENT)
Dept: ADMINISTRATIVE | Facility: OTHER | Age: 59
End: 2024-09-12

## 2024-09-12 NOTE — TELEPHONE ENCOUNTER
09/12/24 9:19 AM     Chart reviewed for CRC: Colonoscopy was/were submitted to the patient's insurance.     aFnny Perez   [facility]

## 2024-11-07 ENCOUNTER — OFFICE VISIT (OUTPATIENT)
Dept: GASTROENTEROLOGY | Facility: CLINIC | Age: 59
End: 2024-11-07
Payer: COMMERCIAL

## 2024-11-07 VITALS
OXYGEN SATURATION: 96 % | TEMPERATURE: 98.2 F | WEIGHT: 149.2 LBS | HEIGHT: 68 IN | DIASTOLIC BLOOD PRESSURE: 94 MMHG | BODY MASS INDEX: 22.61 KG/M2 | SYSTOLIC BLOOD PRESSURE: 190 MMHG

## 2024-11-07 DIAGNOSIS — K52.9 POSTPRANDIAL DIARRHEA: ICD-10-CM

## 2024-11-07 DIAGNOSIS — K21.9 GASTROESOPHAGEAL REFLUX DISEASE WITHOUT ESOPHAGITIS: Primary | ICD-10-CM

## 2024-11-07 DIAGNOSIS — Z80.0 FAMILY HISTORY OF COLON CANCER IN FATHER: ICD-10-CM

## 2024-11-07 DIAGNOSIS — D12.6 TUBULOVILLOUS ADENOMA OF COLON: ICD-10-CM

## 2024-11-07 PROCEDURE — 99214 OFFICE O/P EST MOD 30 MIN: CPT | Performed by: STUDENT IN AN ORGANIZED HEALTH CARE EDUCATION/TRAINING PROGRAM

## 2024-11-07 RX ORDER — OMEPRAZOLE 40 MG/1
40 CAPSULE, DELAYED RELEASE ORAL
Qty: 90 CAPSULE | Refills: 1 | Status: SHIPPED | OUTPATIENT
Start: 2024-11-07

## 2024-11-08 NOTE — ASSESSMENT & PLAN NOTE
Increased risk for colon cancer given her family history.    Next colonoscopy in 3 years as noted above for surveillance after removal of tubulovillous adenoma/right hemicolectomy  Eventual surveillance intervals every 5 years based on family history

## 2024-11-08 NOTE — ASSESSMENT & PLAN NOTE
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  Fortunately, her symptoms are fairly mild.  Her symptoms are dependent on dietary factors.  Recent colonoscopy reviewed.  Low suspicion for IBD or infectious etiology.  Biopsies were negative for celiac sprue.    I have encouraged her to continue following a low-fat diet  If necessary, can consider trial of colestipol although we will hold off for now given her relatively mild symptoms

## 2024-11-08 NOTE — ASSESSMENT & PLAN NOTE
Status post right hemicolectomy with colorectal surgery.  The mass seen in the cecum was confirmed to be tubulovillous adenoma.  There was no evidence of malignancy.  Lymph nodes were negative.  He she has recovered well from her surgery.    As there was no evidence of malignancy, I recommend that we continue with surveillance colonoscopies with the next one being in 3 years based on pathology results  Follow-up with colorectal surgery for postop evaluation

## 2024-11-08 NOTE — ASSESSMENT & PLAN NOTE
Symptoms remain stable at this time.  She remains dependent on the medication otherwise will have significant breakthrough symptoms.  Recent EGD reviewed with small hiatal hernia which may be the cause for her persistent reflux symptoms.  Fortunately, this is well-controlled with once daily omeprazole.  Previous biopsies were negative.    We will continue with omeprazole at this time  No indication for hiatal hernia repair evaluation given its small size and optimal control of symptoms with medical therapy    Orders:    omeprazole (PriLOSEC) 40 MG capsule; Take 1 capsule (40 mg total) by mouth daily before dinner

## 2024-11-15 ENCOUNTER — OFFICE VISIT (OUTPATIENT)
Dept: FAMILY MEDICINE CLINIC | Facility: CLINIC | Age: 59
End: 2024-11-15
Payer: COMMERCIAL

## 2024-11-15 VITALS
HEART RATE: 69 BPM | SYSTOLIC BLOOD PRESSURE: 142 MMHG | DIASTOLIC BLOOD PRESSURE: 92 MMHG | OXYGEN SATURATION: 95 % | TEMPERATURE: 98.8 F | HEIGHT: 68 IN | BODY MASS INDEX: 22.64 KG/M2 | WEIGHT: 149.4 LBS

## 2024-11-15 DIAGNOSIS — Z13.220 ENCOUNTER FOR LIPID SCREENING FOR CARDIOVASCULAR DISEASE: ICD-10-CM

## 2024-11-15 DIAGNOSIS — J43.2 CENTRILOBULAR EMPHYSEMA (HCC): ICD-10-CM

## 2024-11-15 DIAGNOSIS — K21.9 GASTROESOPHAGEAL REFLUX DISEASE WITHOUT ESOPHAGITIS: ICD-10-CM

## 2024-11-15 DIAGNOSIS — D12.6 TUBULOVILLOUS ADENOMA OF COLON: ICD-10-CM

## 2024-11-15 DIAGNOSIS — I83.11 VARICOSE VEINS OF BOTH LOWER EXTREMITIES WITH INFLAMMATION: ICD-10-CM

## 2024-11-15 DIAGNOSIS — Z12.31 ENCOUNTER FOR SCREENING MAMMOGRAM FOR MALIGNANT NEOPLASM OF BREAST: ICD-10-CM

## 2024-11-15 DIAGNOSIS — Z12.4 SCREENING FOR CERVICAL CANCER: ICD-10-CM

## 2024-11-15 DIAGNOSIS — R53.83 FATIGUE, UNSPECIFIED TYPE: ICD-10-CM

## 2024-11-15 DIAGNOSIS — Z13.6 ENCOUNTER FOR LIPID SCREENING FOR CARDIOVASCULAR DISEASE: ICD-10-CM

## 2024-11-15 DIAGNOSIS — Z00.00 ANNUAL PHYSICAL EXAM: Primary | ICD-10-CM

## 2024-11-15 DIAGNOSIS — Z23 ENCOUNTER FOR IMMUNIZATION: ICD-10-CM

## 2024-11-15 DIAGNOSIS — I83.12 VARICOSE VEINS OF BOTH LOWER EXTREMITIES WITH INFLAMMATION: ICD-10-CM

## 2024-11-15 DIAGNOSIS — R91.8 PULMONARY NODULES: ICD-10-CM

## 2024-11-15 DIAGNOSIS — I10 ESSENTIAL HYPERTENSION: ICD-10-CM

## 2024-11-15 PROCEDURE — 90673 RIV3 VACCINE NO PRESERV IM: CPT | Performed by: FAMILY MEDICINE

## 2024-11-15 PROCEDURE — 90471 IMMUNIZATION ADMIN: CPT | Performed by: FAMILY MEDICINE

## 2024-11-15 PROCEDURE — 90472 IMMUNIZATION ADMIN EACH ADD: CPT | Performed by: FAMILY MEDICINE

## 2024-11-15 PROCEDURE — 99396 PREV VISIT EST AGE 40-64: CPT | Performed by: FAMILY MEDICINE

## 2024-11-15 PROCEDURE — 90677 PCV20 VACCINE IM: CPT | Performed by: FAMILY MEDICINE

## 2024-11-15 RX ORDER — LOSARTAN POTASSIUM 50 MG/1
50 TABLET ORAL DAILY
Qty: 30 TABLET | Refills: 2 | Status: SHIPPED | OUTPATIENT
Start: 2024-11-15

## 2024-11-15 NOTE — ASSESSMENT & PLAN NOTE
Status post right hemicolectomy with colorectal surgery.  The mass was seen in the cecum and was confirmed to be tubulovillous adenoma.  There was no evidence of malignancy.  Lymph nodes were negative.  The specialist believes that as there was no evidence of malignancy that they recommend continuing with surveillance colonoscopies with the next one being in 3 years based on pathology results.  Patient will follow-up with colorectal surgery for postop evaluation.  Orders:    CBC and differential; Future    Comprehensive metabolic panel; Future    TSH, 3rd generation with Free T4 reflex; Future    Lipid panel; Future

## 2024-11-15 NOTE — ASSESSMENT & PLAN NOTE
Pulmonary nodules have been stable for greater than 2 years.  Therefore no longer needed to be followed radiographically.  However she does qualify for annual low-dose CT for lung cancer screening purposes.  Orders:    CBC and differential; Future    Comprehensive metabolic panel; Future    TSH, 3rd generation with Free T4 reflex; Future    Lipid panel; Future

## 2024-11-15 NOTE — ASSESSMENT & PLAN NOTE
Patient is gastroenterology on 11/7/2024.  Symptoms were stable.  She remains dependent on medication otherwise will have significant breakthrough symptoms.  Recent EGD was done and revealed a small hiatal hernia which may be the cause of her persistent reflux symptoms.  Previous biopsies were negative.  Orders:    CBC and differential; Future    Comprehensive metabolic panel; Future    TSH, 3rd generation with Free T4 reflex; Future    Lipid panel; Future

## 2024-11-15 NOTE — PROGRESS NOTES
Name: Suki Mares      : 1965      MRN: [id number]  Encounter Provider: Orville Soliman DO  Encounter Date: 11/15/2024   Encounter department: [facility]  :  Assessment & Plan  Annual physical exam  Will obtain baseline labs.       Encounter for immunization    Orders:    influenza vaccine, recombinant, PF, 0.5 mL IM (Flublok)    Pneumococcal Conjugate Vaccine 20-valent (Pcv20)    Screening for cervical cancer    Orders:    Ambulatory Referral to Obstetrics / Gynecology; Future    Encounter for screening mammogram for malignant neoplasm of breast    Orders:    Mammo screening bilateral w 3d and cad; Future    Centrilobular emphysema (HCC)  Patient was seen by pulmonary medicine on 2024.  Patient has a history of centrilobular emphysema.  The patient has found Anoro Ellipta to be beneficial.  Prior pulmonary function test from  showed no significant obstruction but with isolated diffusion impairment.  At that time, pulmonary medicine believed it was not needed to update her PFTs.  Orders:    CBC and differential; Future    Comprehensive metabolic panel; Future    TSH, 3rd generation with Free T4 reflex; Future    Lipid panel; Future    Gastroesophageal reflux disease without esophagitis  Patient is gastroenterology on 2024.  Symptoms were stable.  She remains dependent on medication otherwise will have significant breakthrough symptoms.  Recent EGD was done and revealed a small hiatal hernia which may be the cause of her persistent reflux symptoms.  Previous biopsies were negative.  Orders:    CBC and differential; Future    Comprehensive metabolic panel; Future    TSH, 3rd generation with Free T4 reflex; Future    Lipid panel; Future    Varicose veins of both lower extremities with inflammation  Currently asymptomatic.  Continue to observe.       Tubulovillous adenoma of colon  Status post right hemicolectomy with colorectal surgery.  The mass was seen in the cecum and was  confirmed to be tubulovillous adenoma.  There was no evidence of malignancy.  Lymph nodes were negative.  The specialist believes that as there was no evidence of malignancy that they recommend continuing with surveillance colonoscopies with the next one being in 3 years based on pathology results.  Patient will follow-up with colorectal surgery for postop evaluation.  Orders:    CBC and differential; Future    Comprehensive metabolic panel; Future    TSH, 3rd generation with Free T4 reflex; Future    Lipid panel; Future    Pulmonary nodules  Pulmonary nodules have been stable for greater than 2 years.  Therefore no longer needed to be followed radiographically.  However she does qualify for annual low-dose CT for lung cancer screening purposes.  Orders:    CBC and differential; Future    Comprehensive metabolic panel; Future    TSH, 3rd generation with Free T4 reflex; Future    Lipid panel; Future    Encounter for lipid screening for cardiovascular disease    Orders:    CBC and differential; Future    Comprehensive metabolic panel; Future    TSH, 3rd generation with Free T4 reflex; Future    Lipid panel; Future    Fatigue, unspecified type    Orders:    CBC and differential; Future    Comprehensive metabolic panel; Future    TSH, 3rd generation with Free T4 reflex; Future    Essential hypertension  Low-sodium diet.  Start losartan 50 mg daily.  Blood pressure check in office in 2 weeks.  Blood pressure check with me face-to-face in 4 weeks.  After that, we can communicate through the [employer] system.  Patient will bring her blood pressure machine to the follow-up visit so it can be calibrated.  Orders:    losartan (COZAAR) 50 mg tablet; Take 1 tablet (50 mg total) by mouth daily           History of Present Illness     The patient is a pleasant 59-year-old female who presents today for her annual Medicare examination.  Patient has a significant medical history which I reviewed.  She sees gastroenterology, colorectal  "surgery, and pulmonary medicine.  The patient has a strong family history of colon cancer in the family.  She is status post a right hemicolectomy.  She also has a history of hypertension and preeclampsia.      Review of Systems   Constitutional:  Negative for chills and fever.   HENT:  Negative for ear pain and sore throat.    Eyes:  Negative for pain and visual disturbance.   Respiratory:  Negative for cough and shortness of breath.    Cardiovascular:  Negative for chest pain and palpitations.   Gastrointestinal:  Negative for abdominal pain and vomiting.   Genitourinary:  Negative for dysuria and hematuria.   Musculoskeletal:  Negative for arthralgias and back pain.   Skin:  Negative for color change and rash.   Neurological:  Negative for seizures and syncope.   Psychiatric/Behavioral: Negative.     All other systems reviewed and are negative.         Objective   /92   Pulse 69   Temp 98.8 °F (37.1 °C)   Ht 5' 8\" (1.727 m)   Wt 67.8 kg (149 lb 6.4 oz)   SpO2 95%   BMI 22.72 kg/m²      Physical Exam  Vitals and nursing note reviewed.   Constitutional:       General: She is not in acute distress.     Appearance: She is well-developed.   HENT:      Head: Normocephalic and atraumatic.      Mouth/Throat:      Mouth: Mucous membranes are moist.   Eyes:      Conjunctiva/sclera: Conjunctivae normal.      Pupils: Pupils are equal, round, and reactive to light.   Cardiovascular:      Rate and Rhythm: Normal rate and regular rhythm.      Pulses: Normal pulses.      Heart sounds: Normal heart sounds. No murmur heard.  Pulmonary:      Effort: Pulmonary effort is normal. No respiratory distress.      Breath sounds: Normal breath sounds.   Abdominal:      General: Bowel sounds are normal.      Palpations: Abdomen is soft.      Tenderness: There is no abdominal tenderness.   Musculoskeletal:         General: No swelling.      Cervical back: Neck supple.   Skin:     General: Skin is warm and dry.      Capillary " Refill: Capillary refill takes less than 2 seconds.   Neurological:      General: No focal deficit present.      Mental Status: She is alert and oriented to person, place, and time.   Psychiatric:         Mood and Affect: Mood normal.         Behavior: Behavior normal.

## 2024-11-15 NOTE — ASSESSMENT & PLAN NOTE
Low-sodium diet.  Start losartan 50 mg daily.  Blood pressure check in office in 2 weeks.  Blood pressure check with me face-to-face in 4 weeks.  After that, we can communicate through the [employer] system.  Patient will bring her blood pressure machine to the follow-up visit so it can be calibrated.  Orders:    losartan (COZAAR) 50 mg tablet; Take 1 tablet (50 mg total) by mouth daily

## 2024-11-15 NOTE — ASSESSMENT & PLAN NOTE
Patient was seen by pulmonary medicine on 1/12/2024.  Patient has a history of centrilobular emphysema.  The patient has found Anoro Ellipta to be beneficial.  Prior pulmonary function test from 2020 showed no significant obstruction but with isolated diffusion impairment.  At that time, pulmonary medicine believed it was not needed to update her PFTs.  Orders:    CBC and differential; Future    Comprehensive metabolic panel; Future    TSH, 3rd generation with Free T4 reflex; Future    Lipid panel; Future

## 2024-12-30 DIAGNOSIS — J84.10 PULMONARY FIBROSIS (HCC): ICD-10-CM

## 2024-12-30 RX ORDER — UMECLIDINIUM BROMIDE AND VILANTEROL TRIFENATATE 62.5; 25 UG/1; UG/1
1 POWDER RESPIRATORY (INHALATION) DAILY
Qty: 60 EACH | Refills: 5 | Status: SHIPPED | OUTPATIENT
Start: 2024-12-30

## 2025-02-28 ENCOUNTER — OFFICE VISIT (OUTPATIENT)
Dept: FAMILY MEDICINE CLINIC | Facility: CLINIC | Age: 60
End: 2025-02-28
Payer: COMMERCIAL

## 2025-02-28 VITALS
TEMPERATURE: 98.8 F | HEIGHT: 68 IN | BODY MASS INDEX: 21.89 KG/M2 | OXYGEN SATURATION: 97 % | DIASTOLIC BLOOD PRESSURE: 90 MMHG | SYSTOLIC BLOOD PRESSURE: 170 MMHG | WEIGHT: 144.4 LBS

## 2025-02-28 DIAGNOSIS — F17.210 CIGARETTE NICOTINE DEPENDENCE WITHOUT COMPLICATION: ICD-10-CM

## 2025-02-28 DIAGNOSIS — M21.622 BUNIONETTE OF LEFT FOOT: ICD-10-CM

## 2025-02-28 DIAGNOSIS — I10 ESSENTIAL HYPERTENSION: Primary | ICD-10-CM

## 2025-02-28 DIAGNOSIS — M21.612 BUNION OF GREAT TOE OF LEFT FOOT: ICD-10-CM

## 2025-02-28 DIAGNOSIS — L84 CALLUS BETWEEN TOES: ICD-10-CM

## 2025-02-28 DIAGNOSIS — K21.9 GASTROESOPHAGEAL REFLUX DISEASE WITHOUT ESOPHAGITIS: ICD-10-CM

## 2025-02-28 DIAGNOSIS — J43.2 CENTRILOBULAR EMPHYSEMA (HCC): ICD-10-CM

## 2025-02-28 DIAGNOSIS — J84.10 PULMONARY FIBROSIS (HCC): ICD-10-CM

## 2025-02-28 PROCEDURE — 99214 OFFICE O/P EST MOD 30 MIN: CPT | Performed by: FAMILY MEDICINE

## 2025-02-28 RX ORDER — OMEPRAZOLE 40 MG/1
40 CAPSULE, DELAYED RELEASE ORAL
Qty: 90 CAPSULE | Refills: 1 | Status: SHIPPED | OUTPATIENT
Start: 2025-02-28

## 2025-02-28 RX ORDER — LOSARTAN POTASSIUM 50 MG/1
100 TABLET ORAL DAILY
Qty: 60 TABLET | Refills: 3 | Status: SHIPPED | OUTPATIENT
Start: 2025-02-28

## 2025-02-28 RX ORDER — UMECLIDINIUM BROMIDE AND VILANTEROL TRIFENATATE 62.5; 25 UG/1; UG/1
1 POWDER RESPIRATORY (INHALATION) DAILY
Qty: 60 EACH | Refills: 5 | Status: SHIPPED | OUTPATIENT
Start: 2025-02-28

## 2025-02-28 NOTE — ASSESSMENT & PLAN NOTE
Patient's blood pressure remains uncontrolled despite losartan 50 mg daily.  Patient has not been checking her blood pressures at home.  I stressed compliance.  Her blood pressure is way too high.  Patient does have a history of hypertension and preeclampsia.  Orders:    losartan (COZAAR) 50 mg tablet; Take 2 tablets (100 mg total) by mouth daily  Please note blood work was ordered in November 2024.  A CBC, CMP, TSH and lipid panel were ordered but were never done.

## 2025-02-28 NOTE — PROGRESS NOTES
Name: Suki Mares      : 1965      MRN: [id number]  Encounter Provider: Orville Soliman DO  Encounter Date: 2025   Encounter department: [facility]  :  Assessment & Plan  Essential hypertension  Patient's blood pressure remains uncontrolled despite losartan 50 mg daily.  Patient has not been checking her blood pressures at home.  I stressed compliance.  Her blood pressure is way too high.  Patient does have a history of hypertension and preeclampsia.  Orders:    losartan (COZAAR) 50 mg tablet; Take 2 tablets (100 mg total) by mouth daily  Please note blood work was ordered in 2024.  A CBC, CMP, TSH and lipid panel were ordered but were never done.  Centrilobular emphysema (HCC)  Continue current treatment.  Tobacco cessation.  Currently tolerating inhalers.  Pulmonary medicine following       Cigarette nicotine dependence without complication  Bacot cessation discussion.  Patient declines.  Readdress at each visit.       Pulmonary fibrosis (HCC)  Pulmonary medicine following.  Orders:    umeclidinium-vilanterol (Anoro Ellipta) 62.5-25 mcg/actuation inhaler; Inhale 1 puff daily    Gastroesophageal reflux disease without esophagitis  Currently controlled.  Gastroenterology following.  Orders:    omeprazole (PriLOSEC) 40 MG capsule; Take 1 capsule (40 mg total) by mouth daily before dinner    Bunion of great toe of left foot    Orders:    Ambulatory Referral to Podiatry; Future    Bunionette of left foot    Orders:    Ambulatory Referral to Podiatry; Future    Callus between toes    Orders:    Ambulatory Referral to Podiatry; Future           History of Present Illness   The patient presents today for follow-up.  Patient with a history of hypertension.  Patient has not been checking blood pressure readings at home.  I have stressed compliance with medication.  I have stressed compliance with dietary and lifestyle modification.  The patient also has a history of tobacco use and I  "have advised tobacco cessation.  The patient has a history of emphysema and is tolerating inhalers.      Review of Systems   Constitutional:  Negative for chills and fever.   HENT:  Negative for ear pain and sore throat.    Eyes:  Negative for pain and visual disturbance.   Respiratory:  Negative for cough and shortness of breath.    Cardiovascular:  Negative for chest pain and palpitations.   Gastrointestinal:  Negative for abdominal pain and vomiting.   Genitourinary:  Negative for dysuria and hematuria.   Musculoskeletal:  Negative for arthralgias and back pain.   Skin:  Negative for color change and rash.   Neurological:  Negative for seizures and syncope.   Psychiatric/Behavioral: Negative.     All other systems reviewed and are negative.      Objective   /90 (BP Location: Left arm, Patient Position: Sitting, Cuff Size: Adult)   Temp 98.8 °F (37.1 °C)   Ht 5' 8\" (1.727 m)   Wt 65.5 kg (144 lb 6.4 oz)   SpO2 97%   BMI 21.96 kg/m²      Physical Exam  Vitals and nursing note reviewed.   Constitutional:       General: She is not in acute distress.     Appearance: She is well-developed. She is not ill-appearing, toxic-appearing or diaphoretic.   HENT:      Head: Normocephalic and atraumatic.      Nose: Nose normal.      Mouth/Throat:      Mouth: Mucous membranes are moist.   Eyes:      Conjunctiva/sclera: Conjunctivae normal.      Pupils: Pupils are equal, round, and reactive to light.   Cardiovascular:      Rate and Rhythm: Normal rate and regular rhythm.      Pulses: Normal pulses.      Heart sounds: Normal heart sounds. No murmur heard.  Pulmonary:      Effort: Pulmonary effort is normal. No respiratory distress.      Breath sounds: Normal breath sounds.   Abdominal:      General: Bowel sounds are normal.      Palpations: Abdomen is soft.      Tenderness: There is no abdominal tenderness.   Musculoskeletal:         General: No swelling.      Cervical back: Neck supple.      Comments: Left foot bunion.  " No overlying bursa.  Some overlapping second toe    Left foot bunionette.  Patient does have a callus between the fourth and fifth toe.  No signs of ulceration.    Neurovascular intact   Skin:     General: Skin is warm and dry.      Capillary Refill: Capillary refill takes less than 2 seconds.   Neurological:      General: No focal deficit present.      Mental Status: She is alert and oriented to person, place, and time.   Psychiatric:         Mood and Affect: Mood normal.         Behavior: Behavior normal.

## 2025-02-28 NOTE — ASSESSMENT & PLAN NOTE
Continue current treatment.  Tobacco cessation.  Currently tolerating inhalers.  Pulmonary medicine following

## 2025-02-28 NOTE — ASSESSMENT & PLAN NOTE
Currently controlled.  Gastroenterology following.  Orders:    omeprazole (PriLOSEC) 40 MG capsule; Take 1 capsule (40 mg total) by mouth daily before dinner

## 2025-03-19 ENCOUNTER — OFFICE VISIT (OUTPATIENT)
Dept: PODIATRY | Facility: CLINIC | Age: 60
End: 2025-03-19
Payer: COMMERCIAL

## 2025-03-19 ENCOUNTER — APPOINTMENT (OUTPATIENT)
Dept: RADIOLOGY | Facility: MEDICAL CENTER | Age: 60
End: 2025-03-19
Payer: COMMERCIAL

## 2025-03-19 VITALS
BODY MASS INDEX: 21.82 KG/M2 | OXYGEN SATURATION: 98 % | RESPIRATION RATE: 16 BRPM | HEART RATE: 80 BPM | WEIGHT: 144 LBS | HEIGHT: 68 IN | TEMPERATURE: 98.3 F

## 2025-03-19 DIAGNOSIS — M21.612 BUNION OF GREAT TOE OF LEFT FOOT: ICD-10-CM

## 2025-03-19 DIAGNOSIS — L84 CALLUS BETWEEN TOES: Primary | ICD-10-CM

## 2025-03-19 DIAGNOSIS — L60.3 NAIL DYSTROPHY: ICD-10-CM

## 2025-03-19 DIAGNOSIS — M21.622 BUNIONETTE OF LEFT FOOT: ICD-10-CM

## 2025-03-19 DIAGNOSIS — G57.62 MORTON'S NEUROMA OF LEFT FOOT: ICD-10-CM

## 2025-03-19 PROCEDURE — 99203 OFFICE O/P NEW LOW 30 MIN: CPT | Performed by: PODIATRIST

## 2025-03-19 PROCEDURE — 73630 X-RAY EXAM OF FOOT: CPT

## 2025-03-19 RX ORDER — UREA 40 %
CREAM (GRAM) TOPICAL DAILY
Qty: 120 G | Refills: 0 | Status: SHIPPED | OUTPATIENT
Start: 2025-03-19

## 2025-03-19 NOTE — PROGRESS NOTES
Name: Suki Mares      : 1965      MRN: [id number]  Encounter Provider: Jessika Nunez DPM  Encounter Date: 3/19/2025   Encounter department: [facility]  :  Assessment & Plan  Bunion of great toe of left foot    Orders:    Ambulatory Referral to Podiatry    X-ray foot left 3+ views; Future    Bunionette of left foot    Orders:    Ambulatory Referral to Podiatry    Callus between toes    Orders:    Ambulatory Referral to Podiatry    urea (CARMOL) 40 %; Apply topically daily    Lama's neuroma of left foot         Nail dystrophy           Imaging Reviewed at this visit (I personally reviewed/independently interpreted images and reports in PACS)  XR 3/19/25 foot WB 3v today's date: No acute osseous abnormalities noted.  No acute fracture or dislocation noted.   Joint space narrowing first MPJ with dorsal first metatarsal lipping.  Mild deviation of fifth digit medially with trace fifth adductovarus     IMPRESSION:  Left foot Lama's neuroma third interspace  Left foot bunion  Left foot fifth toe adductovarus  Left painful interdigital corn fourth interspace, medial fourth digit    PLAN:  I reviewed clinical exam and radiographic imaging (XR) with patient in detail today. I have discussed with the patient the pathophysiology of this diagnosis and reviewed how the examination correlates with this diagnosis.  PCP note from 25 reviewed   Patient counseled on Lama's neuroma, bunion deformity, and fifth toe adductovarus.   Conservative modalities reviewed at length including rest, ice, elevation, use of topical oral NSAIDs as well as use of supportive shoes and medical grade orthotics.  Possible use of steroid injection also reviewed  Surgery reviewed including cheilectomy and hammertoe procedure however patient is not a candidate at this time due to mild symptoms and tobacco use.  Patient not interested in surgical intervention at this time  I recommend stiff bottomed  sneakers/shoes (ex Asics, Vionic, New balance, Joshi, etc) for daily use and Elvin Castro (recovery slides) for in home use.   OTC medical grade orthotic recommendations placed in chart  Continue home stretching exercises  Patient counseled on callus care.  Recommended keratolytic moisturizers, use of pumice stone, and toe spacers to prevent pressure and rubbing on the area.  Patient would also benefit from wide toebox messed up her shoes  Urea 40% cream  Great toe nail dystrophy versus onychomycosis reviewed.  Treatment options reviewed including topical versus oral medication versus palliative nail care.  Patient would like to continue with nail care and observation at this time  Follow-up 2 months for repeat evaluation    History of Present Illness   HPI  Suki Mares is a 60 y.o. female who presents for evaluation management of left foot pain.  Patient states that she is always had bilateral foot pain however her left foot pain has began to increase.  Pain is primarily at the great toe joint as well as lateral forefoot at the level of the fourth and fifth toes as well as the fourth interspace.  Patient tries to wear supportive shoes, currently utilizing regular sneakers.  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  She has developed callusing between her fourth and fifth toe.  She tries to pad the area with spacers however calluses seem to recur constantly.  Patient denies any redness, swelling, or drainage from this area.  Patient does state with the distal lateral forefoot pain she sometimes experiences numbness.  She presents today for further evaluation and management      Review of Systems   Constitutional:  Negative for chills and fever.   Respiratory:  Negative for chest tightness and shortness of breath.    Cardiovascular:  Negative for leg swelling.   Musculoskeletal:  Positive for arthralgias and gait problem.   Skin:  Negative for wound.          Objective   Pulse 80   Temp 98.3 °F  "(36.8 °C)   Resp 16   Ht 5' 8\" (1.727 m)   Wt 65.3 kg (144 lb)   SpO2 98%   BMI 21.90 kg/m²      Physical Exam  Constitutional:       General: She is not in acute distress.     Appearance: She is not ill-appearing.   Cardiovascular:      Comments: DP pulse 2/4, PT pulse 1/4  Varicose veins bilaterally  Pedal hair growth absent  Skin temperature gradient decreases from knees to digits bilaterally  Musculoskeletal:      Comments: LLE  Increased bony prominence at the level of first MPJ/medial eminence.  Mild pain with palpation.  Mild pain with end range of motion at first MPJ  Hammering digits 4 and 5.  Fifth digit adductovarus deformity.  Palpable fifth metatarsal head laterally  Negative Yue's click however pain with palpation third interspace with compression of forefoot.    RLE enlarged medial eminence.  Mild pain with end range of motion of first MPJ.  Mild hammering of lesser digits    Silfverskiold negative  Arch reconstitutes with Hipsher maneuver  Heel returns to neutral with heel raise   Skin:     Capillary Refill: Capillary refill takes less than 2 seconds.      Comments: Bilateral great toenails thickened, discolored, mildly incurvated    Left fourth interspace with callus on medial fourth PIPJ and distal fifth digit laterally.  No erythema or edema, no drainage           "

## 2025-03-19 NOTE — PATIENT INSTRUCTIONS
I recommend over-the-counter orthotics which can be purchased online  Redithotic comfort plus  You are looking for an orthotic with arch support for flatfeet and also a neuroma/metatarsal pad    I recommend shoes such as Joshi, new balance, and Ortho feet   Spoke with pt via phone. Informed of results. Pt verbalizes understanding and denies any further questions.

## 2025-04-01 NOTE — PROGRESS NOTES
Name: Suki Mares      : 1965      MRN: [id number]  Encounter Provider: Marti Eugene MD  Encounter Date: 2025   [facility]  :  Assessment & Plan  Cecal neoplasm  Patient is status post laparoscopic right hemicolectomy  She has recovered well, but she has had frequent bowel movements that are difficult to control  We discussed fiber supplementation, up to 3 times per day as well as use of Imodium as needed  Additionally, she will be due for repeat colonoscopy in 3 years  She will follow-up in 1 year to assess bowel function              History of Present Illness   HPI    Suki Mares is a 60 y.o. female with a history of cecal neoplasm who presents for a follow up. She was last seen in the office on 24.    The patient is status post laparoscopic right hemicolectomy on 2024. Pathology; tubulovillous adenoma.     She reports that she is doing well     She reports that she has a bowel movement within 15 minutes after eating anything. She has had episodes of incontinence due to this. She manages this by avoiding eating if she is not home.     She has about 2-5+ bowel movements daily. The consistency of the stool is usually loose. She denies rectal bleeding and blood in the stool.     She has abdominal cramping before bowel movements.     Last colonoscopy was performed on 2024 by Dr. Frederick Quiñones.    Lab Results   Component Value Date    CEA 12.3 (H) 2024     Lab Results   Component Value Date    WBC 10.57 (H) 2024    HGB 13.3 2024    HCT 40.3 2024    MCV 90 2024     2024     Lab Results   Component Value Date    SODIUM 142 2024    K 4.1 2024     2024    CO2 30 2024    AGAP 5 2024    BUN 7 2024    CREATININE 0.51 (L) 2024    GLUC 100 2024    GLUF 99 2024    CALCIUM 9.1 2024    EGFR 104 2024     Review of  "Systems   Constitutional:  Negative for chills and fever.   HENT:  Negative for ear pain and sore throat.    Eyes:  Negative for pain and visual disturbance.   Respiratory:  Negative for cough and shortness of breath.    Cardiovascular:  Negative for chest pain and palpitations.   Gastrointestinal:  Positive for diarrhea. Negative for abdominal pain and vomiting.   Genitourinary:  Negative for dysuria and hematuria.   Musculoskeletal:  Negative for arthralgias and back pain.   Skin:  Negative for color change and rash.   Neurological:  Negative for seizures and syncope.   All other systems reviewed and are negative.      Objective   /82   Ht 5' 8\" (1.727 m)   Wt 66.2 kg (146 lb)   BMI 22.20 kg/m²      Physical Exam  Vitals and nursing note reviewed.   Constitutional:       General: She is not in acute distress.     Appearance: She is well-developed.   HENT:      Head: Normocephalic and atraumatic.   Eyes:      Conjunctiva/sclera: Conjunctivae normal.   Cardiovascular:      Rate and Rhythm: Normal rate and regular rhythm.      Heart sounds: No murmur heard.  Pulmonary:      Effort: Pulmonary effort is normal. No respiratory distress.      Breath sounds: Normal breath sounds.   Abdominal:      Palpations: Abdomen is soft.      Tenderness: There is no abdominal tenderness.   Musculoskeletal:         General: No swelling.      Cervical back: Neck supple.   Skin:     General: Skin is warm and dry.      Capillary Refill: Capillary refill takes less than 2 seconds.   Neurological:      Mental Status: She is alert.   Psychiatric:         Mood and Affect: Mood normal.       Administrative Statements   I have spent a total time of 12 minutes in caring for this patient on the day of the visit/encounter including Diagnostic results, Prognosis, Risks and benefits of tx options, Instructions for management, Patient and family education, Importance of tx compliance, Risk factor reductions, Impressions, Counseling / " Coordination of care, Documenting in the medical record, Reviewing/placing orders in the medical record (including tests, medications, and/or procedures), Obtaining or reviewing history  , and Communicating with other healthcare professionals .

## 2025-04-02 ENCOUNTER — OFFICE VISIT (OUTPATIENT)
Age: 60
End: 2025-04-02
Payer: COMMERCIAL

## 2025-04-02 VITALS
HEIGHT: 68 IN | DIASTOLIC BLOOD PRESSURE: 82 MMHG | WEIGHT: 146 LBS | SYSTOLIC BLOOD PRESSURE: 160 MMHG | BODY MASS INDEX: 22.13 KG/M2

## 2025-04-02 DIAGNOSIS — D49.0 CECAL NEOPLASM: Primary | ICD-10-CM

## 2025-04-02 PROCEDURE — 99212 OFFICE O/P EST SF 10 MIN: CPT | Performed by: SURGERY

## 2025-04-02 NOTE — PATIENT INSTRUCTIONS
High fiber diet/increased hydration, 20-30 grams fiber per day, increased fruits/vegetables    Start fiber supplementation with benefiber. You may put this in your coffee/tea/juice in the morning. Start with 2 tsp once per day. If you experience bloating or gassiness, you may start with 1 tsp once per day. You may increase fiber supplementation to UP TO 2 tsp three times per day in order to achieve 1 formed bowel movement once per day.    You may take 1 2mg tablet of imodium up to four times per day    Aim to drink at least 64 oz of water per day      High Fiber Diet   AMBULATORY CARE:   A high-fiber diet  includes foods that have a high amount of fiber. Fiber is the part of fruits, vegetables, and grains that is not broken down by your body. Fiber keeps your bowel movements regular. Fiber can also help lower your cholesterol level, control blood sugar in people with diabetes, and relieve constipation. Fiber can also help you control your weight because it helps you feel full faster. Most adults should eat 25 to 35 grams of fiber each day. Talk to your dietitian or healthcare provider about the amount of fiber you need.  Good sources of fiber:       Foods with at least 4 grams of fiber per serving:      ? to ½ cup of high-fiber cereal (check the nutrition label on the box)    ½ cup of blackberries or raspberries    4 dried prunes    1 cooked artichoke    ½ cup of cooked legumes, such as lentils, or red, kidney, and cisneros beans    Foods with 1 to 3 grams of fiber per servin slice of whole-wheat, pumpernickel, or rye bread    ½ cup of cooked brown rice    4 whole-wheat crackers    1 cup of oatmeal    ½ cup of cereal with 1 to 3 grams of fiber per serving (check the nutrition label on the box)    1 small piece of fruit, such as an apple, banana, pear, kiwi, or orange    3 dates    ½ cup of canned apricots, fruit cocktail, peaches, or pears    ½ cup of raw or cooked vegetables, such as carrots, cauliflower,  cabbage, spinach, squash, or corn  Ways that you can increase fiber in your diet:   Choose brown or wild rice instead of white rice.     Use whole wheat flour in recipes instead of white or all-purpose flour.     Add beans and peas to casseroles or soups.     Choose fresh fruit and vegetables with peels or skins on instead of juices.    Other diet guidelines to follow:   Add fiber to your diet slowly.  You may have abdominal discomfort, bloating, and gas if you add fiber to your diet too quickly.     Drink plenty of liquids as you add fiber to your diet.  You may have nausea or develop constipation if you do not drink enough water. Ask how much liquid to drink each day and which liquids are best for you.    © Copyright [employer] 2023 Information is for End User's use only and may not be sold, redistributed or otherwise used for commercial purposes.  The above information is an  only. It is not intended as medical advice for individual conditions or treatments. Talk to your doctor, nurse or pharmacist before following any medical regimen to see if it is safe and effective for you.

## 2025-05-08 ENCOUNTER — APPOINTMENT (OUTPATIENT)
Dept: LAB | Facility: MEDICAL CENTER | Age: 60
End: 2025-05-08
Attending: FAMILY MEDICINE
Payer: COMMERCIAL

## 2025-05-08 DIAGNOSIS — J43.2 CENTRILOBULAR EMPHYSEMA (HCC): ICD-10-CM

## 2025-05-08 DIAGNOSIS — K21.9 GASTROESOPHAGEAL REFLUX DISEASE WITHOUT ESOPHAGITIS: ICD-10-CM

## 2025-05-08 DIAGNOSIS — R53.83 FATIGUE, UNSPECIFIED TYPE: ICD-10-CM

## 2025-05-08 DIAGNOSIS — R91.8 PULMONARY NODULES: ICD-10-CM

## 2025-05-08 DIAGNOSIS — Z13.220 ENCOUNTER FOR LIPID SCREENING FOR CARDIOVASCULAR DISEASE: ICD-10-CM

## 2025-05-08 DIAGNOSIS — D12.6 TUBULOVILLOUS ADENOMA OF COLON: ICD-10-CM

## 2025-05-08 DIAGNOSIS — Z13.6 ENCOUNTER FOR LIPID SCREENING FOR CARDIOVASCULAR DISEASE: ICD-10-CM

## 2025-05-08 LAB
ALBUMIN SERPL BCG-MCNC: 4.2 G/DL (ref 3.5–5)
ALP SERPL-CCNC: 62 U/L (ref 34–104)
ALT SERPL W P-5'-P-CCNC: 17 U/L (ref 7–52)
ANION GAP SERPL CALCULATED.3IONS-SCNC: 7 MMOL/L (ref 4–13)
AST SERPL W P-5'-P-CCNC: 19 U/L (ref 13–39)
BASOPHILS # BLD AUTO: 0.05 THOUSANDS/ÂΜL (ref 0–0.1)
BASOPHILS NFR BLD AUTO: 1 % (ref 0–1)
BILIRUB SERPL-MCNC: 0.53 MG/DL (ref 0.2–1)
BUN SERPL-MCNC: 12 MG/DL (ref 5–25)
CALCIUM SERPL-MCNC: 9.2 MG/DL (ref 8.4–10.2)
CHLORIDE SERPL-SCNC: 105 MMOL/L (ref 96–108)
CHOLEST SERPL-MCNC: 151 MG/DL (ref ?–200)
CO2 SERPL-SCNC: 30 MMOL/L (ref 21–32)
CREAT SERPL-MCNC: 0.55 MG/DL (ref 0.6–1.3)
EOSINOPHIL # BLD AUTO: 0.47 THOUSAND/ÂΜL (ref 0–0.61)
EOSINOPHIL NFR BLD AUTO: 5 % (ref 0–6)
ERYTHROCYTE [DISTWIDTH] IN BLOOD BY AUTOMATED COUNT: 13 % (ref 11.6–15.1)
GFR SERPL CREATININE-BSD FRML MDRD: 101 ML/MIN/1.73SQ M
GLUCOSE P FAST SERPL-MCNC: 88 MG/DL (ref 65–99)
HCT VFR BLD AUTO: 40.9 % (ref 34.8–46.1)
HDLC SERPL-MCNC: 60 MG/DL
HGB BLD-MCNC: 13.6 G/DL (ref 11.5–15.4)
IMM GRANULOCYTES # BLD AUTO: 0.03 THOUSAND/UL (ref 0–0.2)
IMM GRANULOCYTES NFR BLD AUTO: 0 % (ref 0–2)
LDLC SERPL CALC-MCNC: 78 MG/DL (ref 0–100)
LYMPHOCYTES # BLD AUTO: 2.5 THOUSANDS/ÂΜL (ref 0.6–4.47)
LYMPHOCYTES NFR BLD AUTO: 29 % (ref 14–44)
MCH RBC QN AUTO: 30.2 PG (ref 26.8–34.3)
MCHC RBC AUTO-ENTMCNC: 33.3 G/DL (ref 31.4–37.4)
MCV RBC AUTO: 91 FL (ref 82–98)
MONOCYTES # BLD AUTO: 0.56 THOUSAND/ÂΜL (ref 0.17–1.22)
MONOCYTES NFR BLD AUTO: 6 % (ref 4–12)
NEUTROPHILS # BLD AUTO: 5.17 THOUSANDS/ÂΜL (ref 1.85–7.62)
NEUTS SEG NFR BLD AUTO: 59 % (ref 43–75)
NONHDLC SERPL-MCNC: 91 MG/DL
NRBC BLD AUTO-RTO: 0 /100 WBCS
PLATELET # BLD AUTO: 323 THOUSANDS/UL (ref 149–390)
PMV BLD AUTO: 10.6 FL (ref 8.9–12.7)
POTASSIUM SERPL-SCNC: 4.5 MMOL/L (ref 3.5–5.3)
PROT SERPL-MCNC: 6.2 G/DL (ref 6.4–8.4)
RBC # BLD AUTO: 4.51 MILLION/UL (ref 3.81–5.12)
SODIUM SERPL-SCNC: 142 MMOL/L (ref 135–147)
TRIGL SERPL-MCNC: 64 MG/DL (ref ?–150)
TSH SERPL DL<=0.05 MIU/L-ACNC: 1.73 UIU/ML (ref 0.45–4.5)
WBC # BLD AUTO: 8.78 THOUSAND/UL (ref 4.31–10.16)

## 2025-05-08 PROCEDURE — 80061 LIPID PANEL: CPT

## 2025-05-08 PROCEDURE — 36415 COLL VENOUS BLD VENIPUNCTURE: CPT

## 2025-05-08 PROCEDURE — 84443 ASSAY THYROID STIM HORMONE: CPT

## 2025-05-08 PROCEDURE — 85025 COMPLETE CBC W/AUTO DIFF WBC: CPT

## 2025-05-08 PROCEDURE — 80053 COMPREHEN METABOLIC PANEL: CPT

## 2025-05-09 ENCOUNTER — RESULTS FOLLOW-UP (OUTPATIENT)
Dept: FAMILY MEDICINE CLINIC | Facility: CLINIC | Age: 60
End: 2025-05-09

## 2025-05-09 ENCOUNTER — OFFICE VISIT (OUTPATIENT)
Dept: FAMILY MEDICINE CLINIC | Facility: CLINIC | Age: 60
End: 2025-05-09
Payer: COMMERCIAL

## 2025-05-09 VITALS
OXYGEN SATURATION: 95 % | RESPIRATION RATE: 18 BRPM | WEIGHT: 143 LBS | BODY MASS INDEX: 21.67 KG/M2 | SYSTOLIC BLOOD PRESSURE: 170 MMHG | TEMPERATURE: 98 F | DIASTOLIC BLOOD PRESSURE: 90 MMHG | HEIGHT: 68 IN | HEART RATE: 91 BPM

## 2025-05-09 DIAGNOSIS — D49.0 CECAL NEOPLASM: ICD-10-CM

## 2025-05-09 DIAGNOSIS — E78.49 OTHER HYPERLIPIDEMIA: ICD-10-CM

## 2025-05-09 DIAGNOSIS — J84.10 PULMONARY FIBROSIS (HCC): ICD-10-CM

## 2025-05-09 DIAGNOSIS — K21.9 GASTROESOPHAGEAL REFLUX DISEASE WITHOUT ESOPHAGITIS: ICD-10-CM

## 2025-05-09 DIAGNOSIS — I10 ESSENTIAL HYPERTENSION: Primary | ICD-10-CM

## 2025-05-09 DIAGNOSIS — J43.2 CENTRILOBULAR EMPHYSEMA (HCC): ICD-10-CM

## 2025-05-09 DIAGNOSIS — F17.210 CIGARETTE NICOTINE DEPENDENCE WITHOUT COMPLICATION: ICD-10-CM

## 2025-05-09 PROCEDURE — 99214 OFFICE O/P EST MOD 30 MIN: CPT | Performed by: FAMILY MEDICINE

## 2025-05-09 RX ORDER — LOSARTAN POTASSIUM 100 MG/1
100 TABLET ORAL DAILY
Qty: 90 TABLET | Refills: 1 | Status: SHIPPED | OUTPATIENT
Start: 2025-05-09

## 2025-05-09 NOTE — PROGRESS NOTES
Name: Suki Mares      : 1965      MRN: [id number]  Encounter Provider: Orville Soliman DO  Encounter Date: 2025   Encounter department: [facility]  :  Assessment & Plan  Essential hypertension  Patient is currently on losartan 50 mg tablets with instructions to take 2 tablets by mouth daily.  I did review blood work that was ordered in 2024.  CBC, CMP, TSH and lipid panel were ordered but were not done.  Orders:    CBC and differential; Future    Comprehensive metabolic panel; Future    TSH, 3rd generation with Free T4 reflex; Future    losartan (Cozaar) 100 MG tablet; Take 1 tablet (100 mg total) by mouth daily    Centrilobular emphysema (HCC)  Continue current treatment as per pulmonary medicine.  Tobacco cessation advised.  Tolerating inhalers.       Cigarette nicotine dependence without complication  Declines cessation.       Pulmonary fibrosis (HCC)  Pulmonary medicine is following.  Continue treatment per their recommendations       Gastroesophageal reflux disease without esophagitis  Currently controlled.  Gastroenterology is following.  Patient is on Prilosec 40 mg by mouth daily before dinner       Cecal neoplasm  Patient recently saw colorectal surgeon on 2025.  Patient is status post laparoscopic right hemicolectomy.  She did recover well at that time she does have some frequent bowel movements that are difficult to control.  She was advised fiber supplementation up to 3 times per day as well as use of Imodium as needed.  She is due for repeat colonoscopy in 3 years.  She is advised to follow-up in 1 year with colorectal surgery to assess bowel function       Other hyperlipidemia    Orders:    Lipid Panel with Direct LDL reflex; Future    Reviewed lab work from yesterday.  Total cholesterol 151, triglycerides 64, HDL 60 and LDL 78.  TSH was normal at 1.730.  Complete metabolic panel revealed normal kidney and liver function.  The patient's albumin level was  "normal.  Her total protein was just slightly low at 6.2.  Complete blood count was normal.       History of Present Illness   Patient is a pleasant 60-year-old female who presents today for follow-up and chronic disease management.      Review of Systems   Constitutional:  Negative for chills and fever.   HENT:  Negative for ear pain and sore throat.    Eyes:  Negative for pain and visual disturbance.   Respiratory:  Negative for cough and shortness of breath.    Cardiovascular:  Negative for chest pain and palpitations.   Gastrointestinal:  Negative for abdominal pain and vomiting.   Genitourinary:  Negative for dysuria and hematuria.   Musculoskeletal:  Negative for arthralgias and back pain.   Skin:  Negative for color change and rash.   Neurological:  Negative for seizures and syncope.   All other systems reviewed and are negative.      Objective   /90 (BP Location: Left arm, Patient Position: Sitting, Cuff Size: Adult)   Pulse 91   Temp 98 °F (36.7 °C) (Temporal)   Resp 18   Ht 5' 8\" (1.727 m)   Wt 64.9 kg (143 lb)   SpO2 95%   BMI 21.74 kg/m²      Physical Exam  Vitals and nursing note reviewed.   Constitutional:       General: She is not in acute distress.     Appearance: She is well-developed. She is not ill-appearing, toxic-appearing or diaphoretic.   HENT:      Head: Normocephalic and atraumatic.      Mouth/Throat:      Mouth: Mucous membranes are moist.   Eyes:      Conjunctiva/sclera: Conjunctivae normal.      Pupils: Pupils are equal, round, and reactive to light.   Cardiovascular:      Rate and Rhythm: Normal rate and regular rhythm.      Heart sounds: No murmur heard.  Pulmonary:      Effort: Pulmonary effort is normal. No respiratory distress.      Breath sounds: Normal breath sounds.   Abdominal:      General: Bowel sounds are normal.      Palpations: Abdomen is soft.      Tenderness: There is no abdominal tenderness.   Musculoskeletal:         General: No swelling.      Cervical back: " Neck supple.   Skin:     General: Skin is warm and dry.      Capillary Refill: Capillary refill takes less than 2 seconds.   Neurological:      General: No focal deficit present.      Mental Status: She is alert and oriented to person, place, and time.   Psychiatric:         Mood and Affect: Mood normal.         Behavior: Behavior normal.         Thought Content: Thought content normal.

## 2025-05-09 NOTE — ASSESSMENT & PLAN NOTE
Patient recently saw colorectal surgeon on 4/2/2025.  Patient is status post laparoscopic right hemicolectomy.  She did recover well at that time she does have some frequent bowel movements that are difficult to control.  She was advised fiber supplementation up to 3 times per day as well as use of Imodium as needed.  She is due for repeat colonoscopy in 3 years.  She is advised to follow-up in 1 year with colorectal surgery to assess bowel function

## 2025-05-09 NOTE — ASSESSMENT & PLAN NOTE
Patient is currently on losartan 50 mg tablets with instructions to take 2 tablets by mouth daily.  I did review blood work that was ordered in November 2024.  CBC, CMP, TSH and lipid panel were ordered but were not done.  Orders:    CBC and differential; Future    Comprehensive metabolic panel; Future    TSH, 3rd generation with Free T4 reflex; Future    losartan (Cozaar) 100 MG tablet; Take 1 tablet (100 mg total) by mouth daily

## 2025-05-09 NOTE — ASSESSMENT & PLAN NOTE
Currently controlled.  Gastroenterology is following.  Patient is on Prilosec 40 mg by mouth daily before dinner

## 2025-05-09 NOTE — ASSESSMENT & PLAN NOTE
Continue current treatment as per pulmonary medicine.  Tobacco cessation advised.  Tolerating inhalers.

## 2025-05-27 ENCOUNTER — DOCUMENTATION (OUTPATIENT)
Dept: FAMILY MEDICINE CLINIC | Facility: CLINIC | Age: 60
End: 2025-05-27

## 2025-05-27 VITALS — SYSTOLIC BLOOD PRESSURE: 160 MMHG | DIASTOLIC BLOOD PRESSURE: 90 MMHG

## 2025-05-27 NOTE — PROGRESS NOTES
Patient came in for BP check, was 160/90. She has appt 6-11-25, I told her to come in either Friday or Tuesday for another check.

## 2025-06-20 ENCOUNTER — OFFICE VISIT (OUTPATIENT)
Dept: FAMILY MEDICINE CLINIC | Facility: CLINIC | Age: 60
End: 2025-06-20
Payer: COMMERCIAL

## 2025-06-20 VITALS
HEIGHT: 68 IN | BODY MASS INDEX: 21.86 KG/M2 | TEMPERATURE: 98.5 F | SYSTOLIC BLOOD PRESSURE: 162 MMHG | WEIGHT: 144.2 LBS | DIASTOLIC BLOOD PRESSURE: 88 MMHG | OXYGEN SATURATION: 94 % | HEART RATE: 76 BPM

## 2025-06-20 DIAGNOSIS — F17.210 CIGARETTE NICOTINE DEPENDENCE WITHOUT COMPLICATION: ICD-10-CM

## 2025-06-20 DIAGNOSIS — J84.10 PULMONARY FIBROSIS (HCC): ICD-10-CM

## 2025-06-20 DIAGNOSIS — I10 ESSENTIAL HYPERTENSION: Primary | ICD-10-CM

## 2025-06-20 DIAGNOSIS — J43.2 CENTRILOBULAR EMPHYSEMA (HCC): ICD-10-CM

## 2025-06-20 DIAGNOSIS — K21.9 GASTROESOPHAGEAL REFLUX DISEASE WITHOUT ESOPHAGITIS: ICD-10-CM

## 2025-06-20 DIAGNOSIS — E78.49 OTHER HYPERLIPIDEMIA: ICD-10-CM

## 2025-06-20 DIAGNOSIS — D49.0 CECAL NEOPLASM: ICD-10-CM

## 2025-06-20 PROCEDURE — 99214 OFFICE O/P EST MOD 30 MIN: CPT | Performed by: FAMILY MEDICINE

## 2025-06-20 RX ORDER — AMLODIPINE BESYLATE 5 MG/1
5 TABLET ORAL DAILY
Qty: 30 TABLET | Refills: 3 | Status: SHIPPED | OUTPATIENT
Start: 2025-06-20

## 2025-06-20 NOTE — ASSESSMENT & PLAN NOTE
Seen colorectal surgeon in April 2025.  Status post laparoscopic right hemicolectomy.  She did recover well.  She does have some difficult to control bowel movements.  She has been advised to increase the fiber into her diet 3 times per day as well as use Imodium as needed.  She needs a repeat colonoscopy in approximately 3 years.  She is advised to follow-up in a year with colorectal surgery to assess bowel function.

## 2025-06-20 NOTE — PROGRESS NOTES
Name: Suki Mares      : 1965      MRN: [id number]  Encounter Provider: Orville Soliman DO  Encounter Date: 2025   Encounter department: [facility]  :  Assessment & Plan  Essential hypertension  Patient currently is taking a total of 100 mg of losartan.  Lab work in the past revealed a CBC, CMP, TSH and lipid panel were ordered.  I reordered them at the last visit and we will see if they were done    Patient was advised to have sleep study 2024 by Dr. Eugene (surgeon).  This order is still active.  I will have my staff printed out and give the patient the phone number to the sleep lab to schedule.  Certainly uncontrolled obstructive sleep apnea can make controlling blood pressure difficult.  Orders:    amLODIPine (NORVASC) 5 mg tablet; Take 1 tablet (5 mg total) by mouth daily    Centrilobular emphysema (HCC)  Treatment protocol by pulmonary medicine.  Tobacco cessation advised.  Tolerating inhalers.       Cigarette nicotine dependence without complication  Respectfully declines cessation, will readdress at each visit       Pulmonary fibrosis (HCC)  Pulmonary medicine is currently following.  Will continue current treatment recommendations       Gastroesophageal reflux disease without esophagitis  Currently tolerating PPI.  Gastroenterology following.       Cecal neoplasm  Seen colorectal surgeon in 2025.  Status post laparoscopic right hemicolectomy.  She did recover well.  She does have some difficult to control bowel movements.  She has been advised to increase the fiber into her diet 3 times per day as well as use Imodium as needed.  She needs a repeat colonoscopy in approximately 3 years.  She is advised to follow-up in a year with colorectal surgery to assess bowel function.       Other hyperlipidemia  Continue current treatment.  Try to review most updated lab work.  Last LDL was 78.       May 8, 2025.  CBC unremarkable.,  Complete metabolic panel unremarkable for  "any significant findings, TSH normal, total cholesterol 151, triglycerides 64, HDL 60 and LDL 78       History of Present Illness   Patient is a pleasant 60-year-old female who presents today for follow-up hypertension and to review blood pressure readings.  She does have a machine at home and has been checking her blood pressure.      Review of Systems   Constitutional:  Negative for chills and fever.   HENT:  Negative for ear pain and sore throat.    Eyes:  Negative for pain and visual disturbance.   Respiratory:  Negative for cough and shortness of breath.    Cardiovascular:  Negative for chest pain and palpitations.   Gastrointestinal:  Negative for abdominal pain and vomiting.   Genitourinary:  Negative for dysuria and hematuria.   Musculoskeletal:  Negative for arthralgias and back pain.   Skin:  Negative for color change and rash.   Neurological:  Negative for seizures and syncope.   All other systems reviewed and are negative.      Objective   /88   Pulse 76   Temp 98.5 °F (36.9 °C)   Ht 5' 8\" (1.727 m)   Wt 65.4 kg (144 lb 3.2 oz)   SpO2 94%   BMI 21.93 kg/m²      Physical Exam  Vitals and nursing note reviewed.   Constitutional:       General: She is not in acute distress.     Appearance: She is well-developed. She is not ill-appearing, toxic-appearing or diaphoretic.   HENT:      Head: Normocephalic and atraumatic.      Mouth/Throat:      Mouth: Mucous membranes are moist.     Eyes:      Conjunctiva/sclera: Conjunctivae normal.       Cardiovascular:      Rate and Rhythm: Normal rate and regular rhythm.      Heart sounds: No murmur heard.  Pulmonary:      Effort: Pulmonary effort is normal. No respiratory distress.      Breath sounds: Normal breath sounds.   Abdominal:      Palpations: Abdomen is soft.      Tenderness: There is no abdominal tenderness.     Musculoskeletal:         General: No swelling.      Cervical back: Neck supple.     Skin:     General: Skin is warm and dry.      Capillary " Refill: Capillary refill takes less than 2 seconds.     Neurological:      General: No focal deficit present.      Mental Status: She is alert and oriented to person, place, and time.     Psychiatric:         Mood and Affect: Mood normal.         Behavior: Behavior normal.         Thought Content: Thought content normal.

## 2025-06-20 NOTE — ASSESSMENT & PLAN NOTE
Patient currently is taking a total of 100 mg of losartan.  Lab work in the past revealed a CBC, CMP, TSH and lipid panel were ordered.  I reordered them at the last visit and we will see if they were done    Patient was advised to have sleep study 8/23/2024 by Dr. Eugene (surgeon).  This order is still active.  I will have my staff printed out and give the patient the phone number to the sleep lab to schedule.  Certainly uncontrolled obstructive sleep apnea can make controlling blood pressure difficult.  Orders:    amLODIPine (NORVASC) 5 mg tablet; Take 1 tablet (5 mg total) by mouth daily

## 2025-07-08 DIAGNOSIS — J84.10 PULMONARY FIBROSIS (HCC): ICD-10-CM

## 2025-07-08 RX ORDER — UMECLIDINIUM BROMIDE AND VILANTEROL TRIFENATATE 62.5; 25 UG/1; UG/1
1 POWDER RESPIRATORY (INHALATION) DAILY
Qty: 60 EACH | Refills: 5 | Status: SHIPPED | OUTPATIENT
Start: 2025-07-08

## 2025-07-29 ENCOUNTER — TELEPHONE (OUTPATIENT)
Dept: GASTROENTEROLOGY | Facility: CLINIC | Age: 60
End: 2025-07-29

## 2025-08-01 ENCOUNTER — OFFICE VISIT (OUTPATIENT)
Dept: FAMILY MEDICINE CLINIC | Facility: CLINIC | Age: 60
End: 2025-08-01
Payer: COMMERCIAL

## 2025-08-01 VITALS
BODY MASS INDEX: 21.58 KG/M2 | WEIGHT: 142.4 LBS | TEMPERATURE: 97.6 F | DIASTOLIC BLOOD PRESSURE: 80 MMHG | HEART RATE: 83 BPM | OXYGEN SATURATION: 97 % | SYSTOLIC BLOOD PRESSURE: 116 MMHG | HEIGHT: 68 IN

## 2025-08-01 DIAGNOSIS — F17.211 NICOTINE DEPENDENCE, CIGARETTES, IN REMISSION: ICD-10-CM

## 2025-08-01 DIAGNOSIS — J43.2 CENTRILOBULAR EMPHYSEMA (HCC): ICD-10-CM

## 2025-08-01 DIAGNOSIS — I10 ESSENTIAL HYPERTENSION: Primary | ICD-10-CM

## 2025-08-01 DIAGNOSIS — D49.0 CECAL NEOPLASM: ICD-10-CM

## 2025-08-01 DIAGNOSIS — K21.9 GASTROESOPHAGEAL REFLUX DISEASE WITHOUT ESOPHAGITIS: ICD-10-CM

## 2025-08-01 DIAGNOSIS — Z12.2 SCREENING FOR LUNG CANCER: ICD-10-CM

## 2025-08-01 DIAGNOSIS — E78.49 OTHER HYPERLIPIDEMIA: ICD-10-CM

## 2025-08-01 DIAGNOSIS — J84.10 PULMONARY FIBROSIS (HCC): ICD-10-CM

## 2025-08-01 DIAGNOSIS — F17.210 CIGARETTE NICOTINE DEPENDENCE WITHOUT COMPLICATION: ICD-10-CM

## 2025-08-01 PROCEDURE — 99214 OFFICE O/P EST MOD 30 MIN: CPT | Performed by: FAMILY MEDICINE

## 2025-08-01 RX ORDER — AMLODIPINE BESYLATE 5 MG/1
5 TABLET ORAL DAILY
Qty: 90 TABLET | Refills: 1 | Status: SHIPPED | OUTPATIENT
Start: 2025-08-01

## 2025-08-01 RX ORDER — LOSARTAN POTASSIUM 100 MG/1
100 TABLET ORAL DAILY
Qty: 90 TABLET | Refills: 1 | Status: SHIPPED | OUTPATIENT
Start: 2025-08-01

## 2025-08-18 ENCOUNTER — HOSPITAL ENCOUNTER (OUTPATIENT)
Dept: CT IMAGING | Facility: HOSPITAL | Age: 60
Discharge: HOME | End: 2025-08-18
Attending: FAMILY MEDICINE
Payer: COMMERCIAL

## 2025-08-18 DIAGNOSIS — Z12.2 SCREENING FOR LUNG CANCER: ICD-10-CM

## 2025-08-18 DIAGNOSIS — F17.211 NICOTINE DEPENDENCE, CIGARETTES, IN REMISSION: ICD-10-CM

## 2025-08-18 PROCEDURE — 71271 CT THORAX LUNG CANCER SCR C-: CPT

## (undated) DEVICE — PROXIMATE RELOADABLE LINEAR CUTTER WITH SAFETY LOCK-OUT, 100MM: Brand: PROXIMATE

## (undated) DEVICE — GLOVE INDICATOR UNDERGLOVE SZ 6 BLUE

## (undated) DEVICE — TROCARS: Brand: KII® BALLOON BLUNT TIP SYSTEM

## (undated) DEVICE — DRAPE INTESTINAL ISOLATION BAG

## (undated) DEVICE — FIBER PROC KIT GOLD TIP 21G 45CM NEVERTOUCH

## (undated) DEVICE — GLOVE SRG BIOGEL ECLIPSE 7

## (undated) DEVICE — STOCKINETTE REGULAR

## (undated) DEVICE — LIGHT HANDLE COVER SLEEVE DISP BLUE STELLAR

## (undated) DEVICE — INTENDED FOR TISSUE SEPARATION, AND OTHER PROCEDURES THAT REQUIRE A SHARP SURGICAL BLADE TO PUNCTURE OR CUT.: Brand: BARD-PARKER SAFETY BLADES SIZE 11, STERILE

## (undated) DEVICE — 3M™ STERI-STRIP™ REINFORCED ADHESIVE SKIN CLOSURES, R1542, 1/4 IN X 1-1/2 IN (6 MM X 38 MM), 6 STRIPS/ENVELOPE: Brand: 3M™ STERI-STRIP™

## (undated) DEVICE — WOUND RETRACTOR AND PROTECTOR: Brand: ALEXIS O WOUND PROTECTOR-RETRACTOR

## (undated) DEVICE — PROXIMATE RELOADABLE LINEAR STAPLER: Brand: PROXIMATE

## (undated) DEVICE — ECHELON FLEX 60 ARTICULATING ENDOSCOPIC LINEAR CUTTER (NO CARTRIDGE): Brand: ECHELON FLEX ENDOPATH

## (undated) DEVICE — SUT PDS II 1 CTX 36 IN Z371T

## (undated) DEVICE — GAUZE SPONGES,16 PLY: Brand: CURITY

## (undated) DEVICE — COVER PROBE INTRAOPERATIVE 6 X 96 IN

## (undated) DEVICE — GLOVE SRG BIOGEL 7.5

## (undated) DEVICE — GLOVE SRG BIOGEL ECLIPSE 5.5

## (undated) DEVICE — EXOFIN PRECISION PEN HIGH VISCOSITY TOPICAL SKIN ADHESIVE: Brand: EXOFIN PRECISION PEN, 1G

## (undated) DEVICE — POOLE SUCTION HANDLE: Brand: CARDINAL HEALTH

## (undated) DEVICE — CHLORAPREP HI-LITE 26ML ORANGE

## (undated) DEVICE — 3M™ STERI-STRIP™ REINFORCED ADHESIVE SKIN CLOSURES, R1547, 1/2 IN X 4 IN (12 MM X 100 MM), 6 STRIPS/ENVELOPE: Brand: 3M™ STERI-STRIP™

## (undated) DEVICE — BETHLEHEM UNIVERSAL MINOR GEN: Brand: CARDINAL HEALTH

## (undated) DEVICE — TRAY FOLEY 16FR URIMETER SURESTEP

## (undated) DEVICE — INTENDED FOR TISSUE SEPARATION, AND OTHER PROCEDURES THAT REQUIRE A SHARP SURGICAL BLADE TO PUNCTURE OR CUT.: Brand: BARD-PARKER SAFETY BLADES SIZE 15, STERILE

## (undated) DEVICE — DRAPE SHEET THREE QUARTER

## (undated) DEVICE — SPONGE STICK WITH PVP-I: Brand: KENDALL

## (undated) DEVICE — 3M™ STERI-STRIP™ REINFORCED ADHESIVE SKIN CLOSURES, R1546, 1/4 IN X 4 IN (6 MM X 100 MM), 10 STRIPS/ENVELOPE: Brand: 3M™ STERI-STRIP™

## (undated) DEVICE — ADHESIVE SKN CLSR HISTOACRYL FLEX 0.5ML LF

## (undated) DEVICE — TIBURON SPLIT SHEET: Brand: CONVERTORS

## (undated) DEVICE — ACE WRAP 4 IN STERILE

## (undated) DEVICE — ACE WRAP 6 IN STERILE

## (undated) DEVICE — ACE WRAP 4 IN UNSTERILE

## (undated) DEVICE — TONGUE DEPRESSOR STERILE

## (undated) DEVICE — COTTON TIP APPLICTOR 2 PK

## (undated) DEVICE — ANTIBACTERIAL UNDYED BRAIDED (POLYGLACTIN 910), SYNTHETIC ABSORBABLE SUTURE: Brand: COATED VICRYL

## (undated) DEVICE — TRIANGLE KNIFE BOX OF 6: Brand: ECTRA

## (undated) DEVICE — BETHLEHEM MAJOR GENERAL PACK: Brand: CARDINAL HEALTH

## (undated) DEVICE — SUT MONOCRYL 5-0 P-3 18 IN Y493G

## (undated) DEVICE — SPONGE PVP SCRUB WING STERILE

## (undated) DEVICE — 3M™ TEGADERM™ TRANSPARENT FILM DRESSING FRAME STYLE, 1626W, 4 IN X 4-3/4 IN (10 CM X 12 CM), 50/CT 4CT/CASE: Brand: 3M™ TEGADERM™

## (undated) DEVICE — STERILE ALLENTOWN HAND PACK: Brand: CARDINAL HEALTH

## (undated) DEVICE — DRAPE SHEET X-LG

## (undated) DEVICE — ULTRASOUND GEL STERILE FOIL PK

## (undated) DEVICE — SUT VICRYL 0 UR-6 27 IN J603H

## (undated) DEVICE — SUT MONOCRYL 4-0 PS-2 27 IN Y426H

## (undated) DEVICE — ALL PURPOSE SPONGES,NON-WOVEN, 4 PLY: Brand: CURITY

## (undated) DEVICE — ACE WRAP 3 IN STERILE

## (undated) DEVICE — TROCAR: Brand: KII SLEEVE

## (undated) DEVICE — Device

## (undated) DEVICE — ANTI-FOG SOLUTION WITH FOAM PAD: Brand: DEVON

## (undated) DEVICE — GLOVE INDICATOR PI UNDERGLOVE SZ 8 BLUE

## (undated) DEVICE — KERLIX BANDAGE ROLL: Brand: KERLIX

## (undated) DEVICE — PADDING CAST 4 IN  COTTON STRL

## (undated) DEVICE — CUFF TOURNIQUET 18 X 4 IN QUICK CONNECT DISP 1 BLADDER

## (undated) DEVICE — ACE WRAP 6 IN UNSTERILE

## (undated) DEVICE — ENDOPATH ECHELON ENDOSCOPIC LINEAR CUTTER RELOADS, WHITE, 60MM: Brand: ECHELON ENDOPATH

## (undated) DEVICE — PROXIMATE LINEAR CUTTER RELOADS (STANDARD)-100MM: Brand: PROXIMATE

## (undated) DEVICE — SYRINGE 10ML LL

## (undated) DEVICE — PENCILETTE SMOKE EVAC PUSH BUTTON COATED

## (undated) DEVICE — NEEDLE 23G X 1 1/2 SAFETY-GLIDE THIN WALL

## (undated) DEVICE — INSUFLATION TUBING INSUFLOW (LEXION)

## (undated) DEVICE — ENSEAL X1 TISSUE SEALER, CURVED JAW, 37 CM SHAFT LENGTH: Brand: ENSEAL